# Patient Record
Sex: FEMALE | Race: WHITE | NOT HISPANIC OR LATINO | Employment: OTHER | URBAN - NONMETROPOLITAN AREA
[De-identification: names, ages, dates, MRNs, and addresses within clinical notes are randomized per-mention and may not be internally consistent; named-entity substitution may affect disease eponyms.]

---

## 2018-06-20 ENCOUNTER — HOSPITAL ENCOUNTER (EMERGENCY)
Facility: HOSPITAL | Age: 48
Discharge: HOME | End: 2018-06-20
Attending: EMERGENCY MEDICINE | Admitting: EMERGENCY MEDICINE

## 2018-06-20 VITALS
HEIGHT: 64 IN | BODY MASS INDEX: 26.84 KG/M2 | WEIGHT: 157.2 LBS | OXYGEN SATURATION: 97 % | DIASTOLIC BLOOD PRESSURE: 93 MMHG | TEMPERATURE: 97.9 F | HEART RATE: 103 BPM | SYSTOLIC BLOOD PRESSURE: 156 MMHG | RESPIRATION RATE: 18 BRPM

## 2018-06-20 DIAGNOSIS — S33.5XXA LUMBAR SPRAIN, INITIAL ENCOUNTER: Primary | ICD-10-CM

## 2018-06-20 PROCEDURE — 25010000002 ORPHENADRINE CITRATE PER 60 MG: Performed by: NURSE PRACTITIONER

## 2018-06-20 PROCEDURE — 99283 EMERGENCY DEPT VISIT LOW MDM: CPT

## 2018-06-20 PROCEDURE — 96372 THER/PROPH/DIAG INJ SC/IM: CPT

## 2018-06-20 PROCEDURE — 25010000002 KETOROLAC TROMETHAMINE PER 15 MG: Performed by: NURSE PRACTITIONER

## 2018-06-20 RX ORDER — CYCLOBENZAPRINE HCL 10 MG
10 TABLET ORAL 2 TIMES DAILY PRN
Qty: 14 TABLET | Refills: 0 | Status: SHIPPED | OUTPATIENT
Start: 2018-06-20 | End: 2020-02-07 | Stop reason: HOSPADM

## 2018-06-20 RX ORDER — NAPROXEN 500 MG/1
500 TABLET ORAL 2 TIMES DAILY WITH MEALS
Qty: 14 TABLET | Refills: 0 | Status: SHIPPED | OUTPATIENT
Start: 2018-06-20 | End: 2018-11-29

## 2018-06-20 RX ORDER — LIDOCAINE 50 MG/G
1 PATCH TOPICAL EVERY 24 HOURS
Qty: 15 PATCH | Refills: 0 | Status: SHIPPED | OUTPATIENT
Start: 2018-06-20 | End: 2020-02-07 | Stop reason: HOSPADM

## 2018-06-20 RX ORDER — ORPHENADRINE CITRATE 30 MG/ML
60 INJECTION INTRAMUSCULAR; INTRAVENOUS ONCE
Status: COMPLETED | OUTPATIENT
Start: 2018-06-20 | End: 2018-06-20

## 2018-06-20 RX ORDER — KETOROLAC TROMETHAMINE 30 MG/ML
15 INJECTION, SOLUTION INTRAMUSCULAR; INTRAVENOUS ONCE
Status: COMPLETED | OUTPATIENT
Start: 2018-06-20 | End: 2018-06-20

## 2018-06-20 RX ORDER — OMEPRAZOLE 20 MG/1
20 CAPSULE, DELAYED RELEASE ORAL DAILY
COMMUNITY
End: 2018-12-03 | Stop reason: SDUPTHER

## 2018-06-20 RX ADMIN — KETOROLAC TROMETHAMINE 15 MG: 30 INJECTION, SOLUTION INTRAMUSCULAR at 14:24

## 2018-06-20 RX ADMIN — ORPHENADRINE CITRATE 60 MG: 30 INJECTION INTRAMUSCULAR; INTRAVENOUS at 14:24

## 2018-06-22 NOTE — ED PROVIDER NOTES
Subjective   History of Present Illness  48-year-old female presents with complaints of left lower back pain.  She states it's been bothering her since Monday.  She does not remember any specific injury.  She does not remember lifting anything that caused her pain.  She states she does have some chronic low back pain and it may be related to that.  She has tried ibuprofen 600 mg once over-the-counter and some Tylenol.  She has used ice which does seem to help.  She denies any change in bowel or bladder habits.  She does have a little pain in her left buttock that radiates somewhat down the thigh.  Review of Systems   All other systems reviewed and are negative.      Past Medical History:   Diagnosis Date   • GERD (gastroesophageal reflux disease)        No Known Allergies    Past Surgical History:   Procedure Laterality Date   • CHOLECYSTECTOMY     • HIP SURGERY     • JOINT REPLACEMENT     • TUBAL ABDOMINAL LIGATION         History reviewed. No pertinent family history.    Social History     Social History   • Marital status:      Social History Main Topics   • Smoking status: Current Every Day Smoker     Packs/day: 0.50     Years: 25.00     Types: Cigarettes   • Smokeless tobacco: Never Used   • Alcohol use Yes      Comment: weekly- couple drinks    • Drug use: No     Other Topics Concern   • Not on file           Objective   Physical Exam   Constitutional: She is oriented to person, place, and time. She appears well-developed and well-nourished.   HENT:   Head: Normocephalic and atraumatic.   Mouth/Throat: Oropharynx is clear and moist.   Eyes: Conjunctivae are normal. Pupils are equal, round, and reactive to light.   Neck: Normal range of motion.   Cardiovascular: Normal rate, regular rhythm, normal heart sounds and intact distal pulses.    No murmur heard.  Pulmonary/Chest: Effort normal and breath sounds normal.   Abdominal: Soft. Bowel sounds are normal.   Musculoskeletal: Normal range of motion.    Decreased range of motion at the lumbar spine due to pain.  No weakness in the lower extremity's bilaterally.   Neurological: She is alert and oriented to person, place, and time.   Negative straight leg raise bilaterally.   Skin: Skin is warm and dry. Capillary refill takes less than 2 seconds.   Psychiatric: She has a normal mood and affect. Her behavior is normal. Judgment and thought content normal.   Nursing note and vitals reviewed.      Procedures           ED Course        Patient was given Toradol 15 mg and Norflex 60 mg.  She states she does have relief and the pain.  I will discharge her with anti-inflammatories and muscle relaxants.  I gave her some stretching exercises as well as recommended ice and alternate with heat.  She should follow-up with her primary care provider if her symptoms do not improve.          MDM      Final diagnoses:   Lumbar sprain, initial encounter            Lisa Chávez, APRN  06/21/18 8095

## 2018-11-29 ENCOUNTER — OFFICE VISIT (OUTPATIENT)
Dept: INTERNAL MEDICINE | Facility: CLINIC | Age: 48
End: 2018-11-29

## 2018-11-29 VITALS
SYSTOLIC BLOOD PRESSURE: 144 MMHG | OXYGEN SATURATION: 97 % | BODY MASS INDEX: 25.68 KG/M2 | WEIGHT: 154.12 LBS | TEMPERATURE: 98 F | RESPIRATION RATE: 16 BRPM | HEIGHT: 65 IN | HEART RATE: 106 BPM | DIASTOLIC BLOOD PRESSURE: 86 MMHG

## 2018-11-29 DIAGNOSIS — K29.50 OTHER CHRONIC GASTRITIS WITHOUT HEMORRHAGE: ICD-10-CM

## 2018-11-29 DIAGNOSIS — R19.7 DIARRHEA, UNSPECIFIED TYPE: ICD-10-CM

## 2018-11-29 DIAGNOSIS — G47.33 OSA (OBSTRUCTIVE SLEEP APNEA): ICD-10-CM

## 2018-11-29 DIAGNOSIS — R10.13 EPIGASTRIC PAIN: ICD-10-CM

## 2018-11-29 DIAGNOSIS — Z00.00 ROUTINE GENERAL MEDICAL EXAMINATION AT A HEALTH CARE FACILITY: ICD-10-CM

## 2018-11-29 DIAGNOSIS — I10 ESSENTIAL HYPERTENSION: Primary | ICD-10-CM

## 2018-11-29 DIAGNOSIS — Z72.0 TOBACCO ABUSE: ICD-10-CM

## 2018-11-29 PROBLEM — K29.70 GASTRITIS: Status: ACTIVE | Noted: 2018-11-29

## 2018-11-29 PROBLEM — Z90.49 S/P CHOLE: Status: ACTIVE | Noted: 2018-11-29

## 2018-11-29 PROBLEM — K44.9 HIATAL HERNIA: Status: ACTIVE | Noted: 2018-11-29

## 2018-11-29 PROBLEM — K57.90 DIVERTICULOSIS: Status: ACTIVE | Noted: 2018-11-29

## 2018-11-29 PROBLEM — K27.9 PUD (PEPTIC ULCER DISEASE): Status: ACTIVE | Noted: 2018-11-29

## 2018-11-29 PROBLEM — Z96.641 HISTORY OF RIGHT HIP REPLACEMENT: Status: ACTIVE | Noted: 2018-11-29

## 2018-11-29 PROCEDURE — 90632 HEPA VACCINE ADULT IM: CPT | Performed by: INTERNAL MEDICINE

## 2018-11-29 PROCEDURE — 99204 OFFICE O/P NEW MOD 45 MIN: CPT | Performed by: INTERNAL MEDICINE

## 2018-11-29 PROCEDURE — 90471 IMMUNIZATION ADMIN: CPT | Performed by: INTERNAL MEDICINE

## 2018-11-29 RX ORDER — HYDROCHLOROTHIAZIDE 12.5 MG/1
12.5 CAPSULE, GELATIN COATED ORAL DAILY
Qty: 30 CAPSULE | Refills: 5 | Status: SHIPPED | OUTPATIENT
Start: 2018-11-29 | End: 2020-02-07 | Stop reason: HOSPADM

## 2018-11-29 RX ORDER — VARENICLINE TARTRATE 1 MG/1
1 TABLET, FILM COATED ORAL
Qty: 60 TABLET | Refills: 2 | Status: SHIPPED | OUTPATIENT
Start: 2018-11-29 | End: 2020-02-07 | Stop reason: HOSPADM

## 2018-11-29 NOTE — PROGRESS NOTES
Subjective     Patient ID: Yee Goodwin is a 48 y.o. female. Patient is here for management of multiple medical problems.     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   • Medication refills     patient needs refills on Omeprazole     History of Present Illness       Moved here from [place].    Seen in Clinic but no established MD.       Works at [employer].  .    Gerd on ppi. Ok control.    Pain in right hip and revision x 2.     BP elevated. Just started in the past year.  No meds.     Tired all the time.  Wakes tired.   Wakes tired.    + snoring.  Possible apnea.       Diarrhea x 7 years.  Coloscopy 3 years ago. Polyps  Still with gastritis.            The following portions of the patient's history were reviewed and updated as appropriate: allergies, current medications, past family history, past medical history, past social history, past surgical history and problem list.    Review of Systems   Constitutional: Positive for fatigue.   HENT: Negative for congestion, dental problem, drooling and ear discharge.    Psychiatric/Behavioral: Positive for sleep disturbance.   All other systems reviewed and are negative.      Current Outpatient Medications:   •  cyclobenzaprine (FLEXERIL) 10 MG tablet, Take 1 tablet by mouth 2 (Two) Times a Day As Needed for Muscle Spasms., Disp: 14 tablet, Rfl: 0  •  hydrochlorothiazide (MICROZIDE) 12.5 MG capsule, Take 1 capsule by mouth Daily., Disp: 30 capsule, Rfl: 5  •  lidocaine (LIDODERM) 5 %, Place 1 patch on the skin Daily. Remove & Discard patch within 12 hours or as directed by MD, Disp: 15 patch, Rfl: 0  •  omeprazole (priLOSEC) 20 MG capsule, Take 20 mg by mouth Daily., Disp: , Rfl:   •  varenicline (CHANTIX CONTINUING MONTH PAWAN) 1 MG tablet, Take 1 tablet  "by mouth 2 (Two) Times a Day., Disp: 60 tablet, Rfl: 2  •  varenicline (CHANTIX STARTING MONTH PAK) 0.5 MG X 11 & 1 MG X 42 tablet, Take 0.5 mg one daily on days 1-3 and and 0.5 mg twice daily on days 4-7.Then 1 mg twice daily for a total of 12 weeks., Disp: 53 tablet, Rfl: 0    Objective      Blood pressure 144/86, pulse 106, temperature 98 °F (36.7 °C), resp. rate 16, height 165.1 cm (65\"), weight 69.9 kg (154 lb 1.9 oz), SpO2 97 %.    Physical Exam     General Appearance:    Alert, cooperative, no distress, appears stated age   Head:    Normocephalic, without obvious abnormality, atraumatic   Eyes:    PERRL, conjunctiva/corneas clear, EOM's intact   Ears:    Normal TM's and external ear canals, both ears   Nose:   Nares normal, septum midline, mucosa normal, no drainage   or sinus tenderness   Throat:   Lips, mucosa, and tongue normal; teeth and gums normal   Neck:   Supple, symmetrical, trachea midline, no adenopathy;        thyroid:  No enlargement/tenderness/nodules; no carotid    bruit or JVD   Back:     Symmetric, no curvature, ROM normal, no CVA tenderness   Lungs:     Clear to auscultation bilaterally, respirations unlabored   Chest wall:    No tenderness or deformity   Heart:    Regular rate and rhythm, S1 and S2 normal, no murmur,        rub or gallop   Abdomen:     Soft, non-tender, bowel sounds active all four quadrants,     no masses, no organomegaly   Extremities:   ttp right hip.  Extremities normal, atraumatic, no cyanosis or edema   Pulses:   2+ and symmetric all extremities   Skin:   Skin color, texture, turgor normal, no rashes or lesions   Lymph nodes:   Cervical, supraclavicular, and axillary nodes normal   Neurologic:   CNII-XII intact. Normal strength, sensation and reflexes       throughout      Results for orders placed or performed during the hospital encounter of 11/23/15   Basic metabolic panel   Result Value Ref Range    Sodium 141 137 - 145 mmol/L    Potassium 4.3 3.5 - 5.1 mmol/L    " Chloride 105 98 - 107 mmol/L    CO2 24 (L) 26 - 30 mmol/L    Anion Gap 16 10 - 20 mmol/L    BUN 9 7 - 20 mg/dL    Creatinine 0.7 0.6 - 1.3 mg/dL    eGFR 90 mL/min    Glucose 86 74 - 98 mg/dL    Calcium 10.4 (H) 8.4 - 10.2 mg/dL   Pregnancy, urine   Result Value Ref Range    HCG Urine, QL NEGATIVE    CBC and Differential   Result Value Ref Range    WBC 7.4 4.8 - 10.8 THOUS    RBC 4.43 4.20 - 5.40 m/uL    Hemoglobin 13.5 12.0 - 16.0 g/dL    Hematocrit 40 37 - 47 %    MCV 90.1 81.0 - 99.0 fL    MCH 30.5 27.0 - 31.0 uug    MCHC 33.8 30.0 - 37.0 g/dL    RDW 13.1 11.5 - 14.5 %    Platelets 356 130 - 400 THOUS    Neutrophil Rel % 66.1 37.0 - 80.0 %    Lymphocyte Rel % 22.6 10.0 - 50.0 %    Monocyte Rel % 8.2 0.0 - 12.0 %    Eosinophil Rel % 1.9 0.0 - 7.0 %    Basophil Rel % 0.90 0.00 - 2.50 %    Immature Granulocyte Rel % 0.30 0.00 - 2.50 %    Neutrophils Absolute 4.92 2.00 - 6.90 THOUS    Lymphocytes Absolute 1.68 0.60 - 3.40 THOUS    Monocytes Absolute 0.61 0.00 - 0.90 THOUS    Eosinophils Absolute 0.14 0.00 - 0.70 THOUS    Basophils Absolute 0.07 0.00 - 0.20 THOUS    Abs Imm Gran 0.02 0.00 - 0.60 THOUS         Assessment/Plan   Pain gel.     Pud and bleeding. Needs to stop nsaids.    Take ppi correctly.  Hx of Ulcer in DD.      Yee was seen today for annual exam and medication refills.    Diagnoses and all orders for this visit:    Essential hypertension  -     Lipid Panel  -     hydrochlorothiazide (MICROZIDE) 12.5 MG capsule; Take 1 capsule by mouth Daily.    Other chronic gastritis without hemorrhage  -     Hepatitis Panel, Acute    JAYESH (obstructive sleep apnea)  -     Hepatitis Panel, Acute  -     Ambulatory Referral to Neurology    Epigastric pain  -     Comprehensive Metabolic Panel  -     CBC & Differential  -     Lipid Panel  -     H. Pylori Antibody, IgG  -     H. Pylori Antibody, IgA  -     Glia(IgA / G) & TTG(IgA / G)  -     Endomysial Antibody, IgA / IGG Titer  -     Hepatitis Panel, Acute    Diarrhea,  unspecified type  -     TSH  -     T4, Free  -     Comprehensive Metabolic Panel  -     Vitamin B12  -     CBC & Differential  -     Lipid Panel  -     H. Pylori Antibody, IgG  -     H. Pylori Antibody, IgA  -     Glia(IgA / G) & TTG(IgA / G)  -     Endomysial Antibody, IgA / IGG Titer  -     Hepatitis Panel, Acute    Tobacco abuse  -     varenicline (CHANTIX STARTING MONTH PAK) 0.5 MG X 11 & 1 MG X 42 tablet; Take 0.5 mg one daily on days 1-3 and and 0.5 mg twice daily on days 4-7.Then 1 mg twice daily for a total of 12 weeks.  -     varenicline (CHANTIX CONTINUING MONTH PAK) 1 MG tablet; Take 1 tablet by mouth 2 (Two) Times a Day.    Routine general medical examination at a health care facility  -     Ambulatory Referral to Gynecology  -     Mammo Screening Digital Tomosynthesis Bilateral With CAD    Other orders  -     Hepatitis A Vaccine Adult IM    pain gel of rhip pain'  rx sent    Return in about 6 weeks (around 1/10/2019).          There are no Patient Instructions on file for this visit.     Ozzie Lucero MD    Assessment/Plan

## 2018-12-03 ENCOUNTER — TELEPHONE (OUTPATIENT)
Dept: INTERNAL MEDICINE | Facility: CLINIC | Age: 48
End: 2018-12-03

## 2018-12-03 RX ORDER — OMEPRAZOLE 20 MG/1
20 CAPSULE, DELAYED RELEASE ORAL DAILY
Qty: 30 CAPSULE | Refills: 5 | Status: SHIPPED | OUTPATIENT
Start: 2018-12-03 | End: 2019-06-05 | Stop reason: SDUPTHER

## 2018-12-03 NOTE — TELEPHONE ENCOUNTER
PATIENT WAS IN THE OFFICE 11/29/2018 TO SEE DR. LLANOS. SHE HAD A FEW PRESCRIPTIONS CALLED INTO HER PHARMACY THAT DAY BUT IS MISSING HER OMEPRAZOLE. CAN THIS BE CALLED INTO [facility] PLEASE?

## 2018-12-10 LAB
ALBUMIN SERPL-MCNC: 4.6 G/DL (ref 3.5–5)
ALBUMIN/GLOB SERPL: 1.5 G/DL (ref 1–2)
ALP SERPL-CCNC: 111 U/L (ref 38–126)
ALT SERPL-CCNC: 67 U/L (ref 13–69)
AST SERPL-CCNC: 40 U/L (ref 15–46)
BASOPHILS # BLD AUTO: 0.11 10*3/MM3 (ref 0–0.2)
BASOPHILS NFR BLD AUTO: 1.4 % (ref 0–2.5)
BILIRUB SERPL-MCNC: 0.7 MG/DL (ref 0.2–1.3)
BUN SERPL-MCNC: 12 MG/DL (ref 7–20)
BUN/CREAT SERPL: 20 (ref 7.1–23.5)
CALCIUM SERPL-MCNC: 10.3 MG/DL (ref 8.4–10.2)
CHLORIDE SERPL-SCNC: 104 MMOL/L (ref 98–107)
CHOLEST SERPL-MCNC: 210 MG/DL (ref 0–199)
CO2 SERPL-SCNC: 26 MMOL/L (ref 26–30)
CREAT SERPL-MCNC: 0.6 MG/DL (ref 0.6–1.3)
ENDOMYSIAL ANTIBODY TITER IGA: NORMAL TITER
ENDOMYSIAL ANTIBODY TITER IGG: NORMAL TITER
EOSINOPHIL # BLD AUTO: 0.27 10*3/MM3 (ref 0–0.7)
EOSINOPHIL NFR BLD AUTO: 3.5 % (ref 0–7)
ERYTHROCYTE [DISTWIDTH] IN BLOOD BY AUTOMATED COUNT: 12.5 % (ref 11.5–14.5)
GLIADIN PEPTIDE IGA SER-ACNC: 4 UNITS (ref 0–19)
GLIADIN PEPTIDE IGG SER-ACNC: 5 UNITS (ref 0–19)
GLOBULIN SER CALC-MCNC: 3.1 GM/DL
GLUCOSE SERPL-MCNC: 106 MG/DL (ref 74–98)
H PYLORI IGA SER-ACNC: <9 UNITS (ref 0–8.9)
H PYLORI IGG SER IA-ACNC: 0.21 INDEX VALUE (ref 0–0.79)
HAV IGM SERPL QL IA: NEGATIVE
HBV CORE IGM SERPL QL IA: NEGATIVE
HBV SURFACE AG SERPL QL IA: NEGATIVE
HCT VFR BLD AUTO: 45.2 % (ref 37–47)
HCV AB S/CO SERPL IA: <0.1 S/CO RATIO (ref 0–0.9)
HDLC SERPL-MCNC: 57 MG/DL (ref 40–60)
HGB BLD-MCNC: 14.9 G/DL (ref 12–16)
IMM GRANULOCYTES # BLD: 0.01 10*3/MM3 (ref 0–0.06)
IMM GRANULOCYTES NFR BLD: 0.1 % (ref 0–0.6)
LDLC SERPL CALC-MCNC: 126 MG/DL (ref 0–99)
LYMPHOCYTES # BLD AUTO: 1.91 10*3/MM3 (ref 0.6–3.4)
LYMPHOCYTES NFR BLD AUTO: 24.6 % (ref 10–50)
MCH RBC QN AUTO: 31.1 PG (ref 27–31)
MCHC RBC AUTO-ENTMCNC: 33 G/DL (ref 30–37)
MCV RBC AUTO: 94.4 FL (ref 81–99)
MONOCYTES # BLD AUTO: 0.53 10*3/MM3 (ref 0–0.9)
MONOCYTES NFR BLD AUTO: 6.8 % (ref 0–12)
NEUTROPHILS # BLD AUTO: 4.94 10*3/MM3 (ref 2–6.9)
NEUTROPHILS NFR BLD AUTO: 63.6 % (ref 37–80)
NRBC BLD AUTO-RTO: 0 /100 WBC (ref 0–0)
PLATELET # BLD AUTO: 358 10*3/MM3 (ref 130–400)
POTASSIUM SERPL-SCNC: 4.1 MMOL/L (ref 3.5–5.1)
PROT SERPL-MCNC: 7.7 G/DL (ref 6.3–8.2)
RBC # BLD AUTO: 4.79 10*6/MM3 (ref 4.2–5.4)
SODIUM SERPL-SCNC: 138 MMOL/L (ref 137–145)
T4 FREE SERPL-MCNC: 1.09 NG/DL (ref 0.78–2.19)
TRIGL SERPL-MCNC: 137 MG/DL
TSH SERPL DL<=0.005 MIU/L-ACNC: 2.7 MIU/ML (ref 0.47–4.68)
TTG IGA SER-ACNC: <2 U/ML (ref 0–3)
TTG IGG SER-ACNC: 5 U/ML (ref 0–5)
VIT B12 SERPL-MCNC: 673 PG/ML (ref 239–931)
VLDLC SERPL CALC-MCNC: 27.4 MG/DL
WBC # BLD AUTO: 7.77 10*3/MM3 (ref 4.8–10.8)

## 2019-04-01 ENCOUNTER — OFFICE VISIT (OUTPATIENT)
Dept: ORTHOPEDIC SURGERY | Facility: CLINIC | Age: 49
End: 2019-04-01

## 2019-04-01 VITALS — WEIGHT: 162 LBS | BODY MASS INDEX: 27.66 KG/M2 | HEIGHT: 64 IN | RESPIRATION RATE: 18 BRPM

## 2019-04-01 DIAGNOSIS — S46.912A SHOULDER STRAIN, LEFT, INITIAL ENCOUNTER: ICD-10-CM

## 2019-04-01 DIAGNOSIS — M25.512 PAIN IN JOINT OF LEFT SHOULDER: Primary | ICD-10-CM

## 2019-04-01 PROCEDURE — 99203 OFFICE O/P NEW LOW 30 MIN: CPT | Performed by: ORTHOPAEDIC SURGERY

## 2019-04-01 NOTE — PROGRESS NOTES
Subjective   Patient ID: Yee Goodwin is a 48 y.o. female  Pain of the Left Shoulder (Patient states she was fixing to sit in chair when her shoulder popped, she states she had her hands on the arm rest as she plopped down on 03/27/19. 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  Since this occurred she has been able to move her arm but some pain mostly at the AC joint and medial clavicular area no bruising no history of prior injury to the shoulder that she can recall no numbness tingling does have chronic stiffness in the neck but pain in the neck does not radiate to the shoulder or arm.  Unable to take anti-inflammatories due to severe peptic ulcer disease      Review of Systems   Constitutional: Negative for fever.   HENT: Negative for voice change.    Eyes: Negative for visual disturbance.   Respiratory: Negative for shortness of breath.    Cardiovascular: Negative for chest pain.   Gastrointestinal: Negative for abdominal distention and abdominal pain.   Genitourinary: Negative for dysuria.   Musculoskeletal: Positive for arthralgias. Negative for gait problem and joint swelling.   Skin: Negative for rash.   Neurological: Negative for speech difficulty.   Hematological: Does not bruise/bleed easily.   Psychiatric/Behavioral: Negative for confusion.       Past Medical History:   Diagnosis Date   • Arthritis    • Diverticulitis    • GERD (gastroesophageal reflux disease)         Past Surgical History:   Procedure Laterality Date   • CHOLECYSTECTOMY     • COLONOSCOPY  2015   • HAND TENDON SURGERY Right     patient hand right thumb tendon surgery   • HIP SURGERY     • JOINT REPLACEMENT     • TUBAL ABDOMINAL LIGATION         Family History   Problem Relation Age  "of Onset   • Arthritis Mother    • Hypertension Mother    • Stroke Mother    • Arthritis Father    • Cancer Father    • Lung cancer Father    • Throat cancer Father    • Heart disease Sister    • Pulmonary embolism Brother        Social History     Socioeconomic History   • Marital status:      Spouse name: Not on file   • Number of children: Not on file   • Years of education: Not on file   • Highest education level: Not on file   Tobacco Use   • Smoking status: Current Every Day Smoker     Packs/day: 0.50     Years: 25.00     Pack years: 12.50     Types: Cigarettes   • Smokeless tobacco: Never Used   Substance and Sexual Activity   • Alcohol use: Yes     Comment: weekly- couple drinks    • Drug use: No   • Sexual activity: Defer       I have reviewed all of the above social hx, family hx, surgical hx, medications, allergies & ROS and confirm that it is accurate.    Allergies   Allergen Reactions   • Venlafaxine Rash         Current Outpatient Medications:   •  cyclobenzaprine (FLEXERIL) 10 MG tablet, Take 1 tablet by mouth 2 (Two) Times a Day As Needed for Muscle Spasms., Disp: 14 tablet, Rfl: 0  •  hydrochlorothiazide (MICROZIDE) 12.5 MG capsule, Take 1 capsule by mouth Daily., Disp: 30 capsule, Rfl: 5  •  lidocaine (LIDODERM) 5 %, Place 1 patch on the skin Daily. Remove & Discard patch within 12 hours or as directed by MD, Disp: 15 patch, Rfl: 0  •  omeprazole (priLOSEC) 20 MG capsule, Take 1 capsule by mouth Daily., Disp: 30 capsule, Rfl: 5  •  varenicline (CHANTIX CONTINUING MONTH PAWAN) 1 MG tablet, Take 1 tablet by mouth 2 (Two) Times a Day., Disp: 60 tablet, Rfl: 2  •  varenicline (CHANTIX STARTING MONTH PAK) 0.5 MG X 11 & 1 MG X 42 tablet, Take 0.5 mg one daily on days 1-3 and and 0.5 mg twice daily on days 4-7.Then 1 mg twice daily for a total of 12 weeks., Disp: 53 tablet, Rfl: 0    Objective   Resp 18   Ht 162.6 cm (64\")   Wt 73.5 kg (162 lb)   BMI 27.81 kg/m²    Physical Exam  Constitutional: " Patient is oriented to person, place, and time. Patient appears well-developed and well-nourished.   HENT:Head: Normocephalic and atraumatic.   Eyes: EOM are normal. Pupils are equal, round, and reactive to light.   Neck: Normal range of motion. Neck supple.   Cardiovascular: Normal rate.    Pulmonary/Chest: Effort normal and breath sounds normal.   Abdominal: Soft.   Neurological: Patient is alert and oriented to person, place, and time.   Skin: Skin is warm and dry.   Psychiatric: Patient has a normal mood and affect.   Nursing note and vitals reviewed.       [unfilled]   Left shoulder: Point tenderness at the distal AC joint and medial sternoclavicular area slight prominence noted anteriorly of the medial clavicular region at the sternoclavicular joint with no ecchymosis.  Full active range of motion of the left shoulder minimal signs of impingement cross body abduction does reproduce pain at the medial sternoclavicular joint anteriorly.  Negative inferior sulcus sign no signs of anterior apprehension    Assessment/Plan   Review of Radiographic Studies:    Radiographic images today of affected area I personally viewed and showed no sign of acute fracture or dislocation.      Procedures     Yee was seen today for pain.    Diagnoses and all orders for this visit:    Pain in joint of left shoulder  -     XR Clavicle Left  -     XR Shoulder 2+ View Left    Shoulder strain, left, initial encounter       Physical therapy referral given      Recommendations/Plan:   Referral: PT/OT 2-3 x wk x 4-6 wks    Patient agreeable to call or return sooner for any concerns.             Impression:  Left nondominant shoulder sternoclavicular anterior subluxation with strain, left acromial clavicular joint strain  Plan:  Refer to therapy recheck 6 weeks if not improving order MR left shoulder at that time

## 2019-06-05 RX ORDER — OMEPRAZOLE 20 MG/1
CAPSULE, DELAYED RELEASE ORAL
Qty: 30 CAPSULE | Refills: 0 | Status: SHIPPED | OUTPATIENT
Start: 2019-06-05 | End: 2019-07-08 | Stop reason: SDUPTHER

## 2019-06-05 RX ORDER — OMEPRAZOLE 20 MG/1
CAPSULE, DELAYED RELEASE ORAL
Qty: 90 CAPSULE | Refills: 0 | OUTPATIENT
Start: 2019-06-05

## 2019-07-08 RX ORDER — OMEPRAZOLE 20 MG/1
CAPSULE, DELAYED RELEASE ORAL
Qty: 30 CAPSULE | Refills: 0 | Status: SHIPPED | OUTPATIENT
Start: 2019-07-08 | End: 2020-02-07 | Stop reason: HOSPADM

## 2019-08-12 ENCOUNTER — TRANSCRIBE ORDERS (OUTPATIENT)
Dept: ADMINISTRATIVE | Facility: HOSPITAL | Age: 49
End: 2019-08-12

## 2019-08-12 DIAGNOSIS — R74.8 ACID PHOSPHATASE ELEVATED: Primary | ICD-10-CM

## 2020-01-28 ENCOUNTER — APPOINTMENT (OUTPATIENT)
Dept: GENERAL RADIOLOGY | Facility: HOSPITAL | Age: 50
End: 2020-01-28

## 2020-01-28 ENCOUNTER — APPOINTMENT (OUTPATIENT)
Dept: CT IMAGING | Facility: HOSPITAL | Age: 50
End: 2020-01-28

## 2020-01-28 ENCOUNTER — HOSPITAL ENCOUNTER (INPATIENT)
Facility: HOSPITAL | Age: 50
LOS: 10 days | Discharge: NURSING, REHAB OR LONG-TERM CARE | End: 2020-02-07
Attending: EMERGENCY MEDICINE | Admitting: NEUROLOGICAL SURGERY

## 2020-01-28 DIAGNOSIS — R48.2 APRAXIA: ICD-10-CM

## 2020-01-28 DIAGNOSIS — R47.01 APHASIA: ICD-10-CM

## 2020-01-28 DIAGNOSIS — R13.10 DYSPHAGIA, UNSPECIFIED TYPE: ICD-10-CM

## 2020-01-28 DIAGNOSIS — I62.9 INTRACRANIAL HEMORRHAGE (HCC): ICD-10-CM

## 2020-01-28 DIAGNOSIS — Z78.9 IMPAIRED MOBILITY AND ADLS: ICD-10-CM

## 2020-01-28 DIAGNOSIS — I61.9 ACUTE INTRACEREBRAL HEMORRHAGE (HCC): Primary | ICD-10-CM

## 2020-01-28 DIAGNOSIS — Z74.09 IMPAIRED MOBILITY AND ADLS: ICD-10-CM

## 2020-01-28 PROBLEM — I16.1 HYPERTENSIVE EMERGENCY: Status: ACTIVE | Noted: 2020-01-28

## 2020-01-28 PROBLEM — E87.6 HYPOKALEMIA: Status: ACTIVE | Noted: 2020-01-28

## 2020-01-28 PROBLEM — J96.02 ACUTE RESPIRATORY FAILURE WITH HYPOXIA AND HYPERCAPNIA (HCC): Status: ACTIVE | Noted: 2020-01-28

## 2020-01-28 PROBLEM — J96.01 ACUTE RESPIRATORY FAILURE WITH HYPOXIA AND HYPERCAPNIA: Status: ACTIVE | Noted: 2020-01-28

## 2020-01-28 PROBLEM — R74.01 ELEVATED TRANSAMINASE LEVEL: Status: ACTIVE | Noted: 2020-01-28

## 2020-01-28 PROBLEM — E78.5 DYSLIPIDEMIA: Status: ACTIVE | Noted: 2020-01-28

## 2020-01-28 LAB
ALBUMIN SERPL-MCNC: 4.8 G/DL (ref 3.5–5.2)
ALBUMIN/GLOB SERPL: 1.5 G/DL
ALP SERPL-CCNC: 111 U/L (ref 39–117)
ALT SERPL W P-5'-P-CCNC: 139 U/L (ref 1–33)
AMPHET+METHAMPHET UR QL: NEGATIVE
AMPHETAMINES UR QL: NEGATIVE
ANION GAP SERPL CALCULATED.3IONS-SCNC: 16 MMOL/L (ref 5–15)
APTT PPP: 21.7 SECONDS (ref 24–37)
ARTERIAL PATENCY WRIST A: ABNORMAL
AST SERPL-CCNC: 120 U/L (ref 1–32)
ATMOSPHERIC PRESS: ABNORMAL MM[HG]
B-HCG UR QL: NEGATIVE
BACTERIA UR QL AUTO: NORMAL /HPF
BARBITURATES UR QL SCN: NEGATIVE
BASE EXCESS BLDA CALC-SCNC: 1 MMOL/L (ref -5–5)
BASE EXCESS BLDA CALC-SCNC: 2.6 MMOL/L (ref 0–2)
BASOPHILS # BLD AUTO: 0.11 10*3/MM3 (ref 0–0.2)
BASOPHILS NFR BLD AUTO: 1.2 % (ref 0–1.5)
BDY SITE: ABNORMAL
BENZODIAZ UR QL SCN: NEGATIVE
BILIRUB SERPL-MCNC: 0.2 MG/DL (ref 0.2–1.2)
BILIRUB UR QL STRIP: NEGATIVE
BODY TEMPERATURE: 37 C
BUN BLD-MCNC: 15 MG/DL (ref 6–20)
BUN/CREAT SERPL: 25 (ref 7–25)
BUPRENORPHINE SERPL-MCNC: NEGATIVE NG/ML
CA-I BLDA-SCNC: 1.27 MMOL/L (ref 1.2–1.32)
CALCIUM SPEC-SCNC: 9.3 MG/DL (ref 8.6–10.5)
CANNABINOIDS SERPL QL: NEGATIVE
CHLORIDE SERPL-SCNC: 100 MMOL/L (ref 98–107)
CLARITY UR: CLEAR
CO2 BLDA-SCNC: 28 MMOL/L (ref 24–29)
CO2 BLDA-SCNC: 30.2 MMOL/L (ref 22–33)
CO2 SERPL-SCNC: 25 MMOL/L (ref 22–29)
COCAINE UR QL: NEGATIVE
COHGB MFR BLD: 1.3 % (ref 0–2)
COLOR UR: YELLOW
CREAT BLD-MCNC: 0.6 MG/DL (ref 0.57–1)
DEPRECATED RDW RBC AUTO: 44.8 FL (ref 37–54)
EOSINOPHIL # BLD AUTO: 0.16 10*3/MM3 (ref 0–0.4)
EOSINOPHIL NFR BLD AUTO: 1.8 % (ref 0.3–6.2)
ERYTHROCYTE [DISTWIDTH] IN BLOOD BY AUTOMATED COUNT: 13 % (ref 12.3–15.4)
GFR SERPL CREATININE-BSD FRML MDRD: 105 ML/MIN/1.73
GLOBULIN UR ELPH-MCNC: 3.2 GM/DL
GLUCOSE BLD-MCNC: 173 MG/DL (ref 65–99)
GLUCOSE BLDC GLUCOMTR-MCNC: 116 MG/DL (ref 70–130)
GLUCOSE BLDC GLUCOMTR-MCNC: 122 MG/DL (ref 70–130)
GLUCOSE UR STRIP-MCNC: NEGATIVE MG/DL
HCO3 BLDA-SCNC: 26.9 MMOL/L (ref 22–26)
HCO3 BLDA-SCNC: 28.7 MMOL/L (ref 20–26)
HCT VFR BLD AUTO: 40.8 % (ref 34–46.6)
HCT VFR BLD CALC: 39.9 %
HCT VFR BLDA CALC: 38 % (ref 38–51)
HGB BLD-MCNC: 13.3 G/DL (ref 12–15.9)
HGB BLDA-MCNC: 12.9 G/DL (ref 12–17)
HGB BLDA-MCNC: 13 G/DL (ref 14–18)
HGB UR QL STRIP.AUTO: NEGATIVE
HOROWITZ INDEX BLD+IHG-RTO: 60 %
HYALINE CASTS UR QL AUTO: NORMAL /LPF
IMM GRANULOCYTES # BLD AUTO: 0.04 10*3/MM3 (ref 0–0.05)
IMM GRANULOCYTES NFR BLD AUTO: 0.4 % (ref 0–0.5)
INR PPP: 0.94 (ref 0.85–1.16)
KETONES UR QL STRIP: NEGATIVE
LEUKOCYTE ESTERASE UR QL STRIP.AUTO: NEGATIVE
LYMPHOCYTES # BLD AUTO: 1.57 10*3/MM3 (ref 0.7–3.1)
LYMPHOCYTES NFR BLD AUTO: 17.4 % (ref 19.6–45.3)
MAGNESIUM SERPL-MCNC: 1.7 MG/DL (ref 1.6–2.6)
MCH RBC QN AUTO: 30.8 PG (ref 26.6–33)
MCHC RBC AUTO-ENTMCNC: 32.6 G/DL (ref 31.5–35.7)
MCV RBC AUTO: 94.4 FL (ref 79–97)
METHADONE UR QL SCN: NEGATIVE
METHGB BLD QL: 1.2 % (ref 0–1.5)
MODALITY: ABNORMAL
MONOCYTES # BLD AUTO: 0.71 10*3/MM3 (ref 0.1–0.9)
MONOCYTES NFR BLD AUTO: 7.9 % (ref 5–12)
NEUTROPHILS # BLD AUTO: 6.45 10*3/MM3 (ref 1.7–7)
NEUTROPHILS NFR BLD AUTO: 71.3 % (ref 42.7–76)
NITRITE UR QL STRIP: NEGATIVE
NOTE: ABNORMAL
NRBC BLD AUTO-RTO: 0 /100 WBC (ref 0–0.2)
OPIATES UR QL: NEGATIVE
OXYCODONE UR QL SCN: NEGATIVE
OXYHGB MFR BLDV: 95.4 % (ref 94–99)
PCO2 BLDA: 49.6 MM HG (ref 35–45)
PCO2 BLDA: 50 MM HG (ref 35–45)
PCO2 TEMP ADJ BLD: 49.6 MM HG (ref 35–45)
PCP UR QL SCN: NEGATIVE
PH BLDA: 7.34 PH UNITS (ref 7.35–7.6)
PH BLDA: 7.37 PH UNITS (ref 7.35–7.45)
PH UR STRIP.AUTO: <=5 [PH] (ref 5–8)
PH, TEMP CORRECTED: 7.37 PH UNITS
PLATELET # BLD AUTO: 309 10*3/MM3 (ref 140–450)
PMV BLD AUTO: 9.3 FL (ref 6–12)
PO2 BLDA: 42 MMHG (ref 80–105)
PO2 BLDA: 96.6 MM HG (ref 83–108)
PO2 TEMP ADJ BLD: 96.6 MM HG (ref 83–108)
POTASSIUM BLD-SCNC: 3.3 MMOL/L (ref 3.5–5.2)
POTASSIUM BLDA-SCNC: 3.2 MMOL/L (ref 3.5–4.9)
PROPOXYPH UR QL: NEGATIVE
PROT SERPL-MCNC: 8 G/DL (ref 6–8.5)
PROT UR QL STRIP: ABNORMAL
PROTHROMBIN TIME: 12.1 SECONDS (ref 11.2–14.3)
RBC # BLD AUTO: 4.32 10*6/MM3 (ref 3.77–5.28)
RBC # UR: NORMAL /HPF
REF LAB TEST METHOD: NORMAL
SAO2 % BLDA: 73 % (ref 95–98)
SODIUM BLD-SCNC: 141 MMOL/L (ref 136–145)
SODIUM BLDA-SCNC: 140 MMOL/L (ref 138–146)
SP GR UR STRIP: 1.09 (ref 1–1.03)
SQUAMOUS #/AREA URNS HPF: NORMAL /HPF
TRICYCLICS UR QL SCN: NEGATIVE
UROBILINOGEN UR QL STRIP: ABNORMAL
VENTILATOR MODE: ABNORMAL
WBC NRBC COR # BLD: 9.04 10*3/MM3 (ref 3.4–10.8)
WBC UR QL AUTO: NORMAL /HPF

## 2020-01-28 PROCEDURE — 94640 AIRWAY INHALATION TREATMENT: CPT

## 2020-01-28 PROCEDURE — 94799 UNLISTED PULMONARY SVC/PX: CPT

## 2020-01-28 PROCEDURE — 0 IOPAMIDOL PER 1 ML: Performed by: EMERGENCY MEDICINE

## 2020-01-28 PROCEDURE — 71045 X-RAY EXAM CHEST 1 VIEW: CPT

## 2020-01-28 PROCEDURE — 25010000002 MIDAZOLAM PER 1 MG: Performed by: EMERGENCY MEDICINE

## 2020-01-28 PROCEDURE — 81001 URINALYSIS AUTO W/SCOPE: CPT | Performed by: INTERNAL MEDICINE

## 2020-01-28 PROCEDURE — 85025 COMPLETE CBC W/AUTO DIFF WBC: CPT | Performed by: EMERGENCY MEDICINE

## 2020-01-28 PROCEDURE — 99291 CRITICAL CARE FIRST HOUR: CPT | Performed by: INTERNAL MEDICINE

## 2020-01-28 PROCEDURE — 82330 ASSAY OF CALCIUM: CPT

## 2020-01-28 PROCEDURE — 85610 PROTHROMBIN TIME: CPT | Performed by: EMERGENCY MEDICINE

## 2020-01-28 PROCEDURE — 93005 ELECTROCARDIOGRAM TRACING: CPT | Performed by: EMERGENCY MEDICINE

## 2020-01-28 PROCEDURE — 82803 BLOOD GASES ANY COMBINATION: CPT

## 2020-01-28 PROCEDURE — 94002 VENT MGMT INPAT INIT DAY: CPT

## 2020-01-28 PROCEDURE — 31500 INSERT EMERGENCY AIRWAY: CPT

## 2020-01-28 PROCEDURE — 85730 THROMBOPLASTIN TIME PARTIAL: CPT | Performed by: EMERGENCY MEDICINE

## 2020-01-28 PROCEDURE — 82947 ASSAY GLUCOSE BLOOD QUANT: CPT

## 2020-01-28 PROCEDURE — 94770: CPT

## 2020-01-28 PROCEDURE — 80306 DRUG TEST PRSMV INSTRMNT: CPT | Performed by: NURSE PRACTITIONER

## 2020-01-28 PROCEDURE — 36600 WITHDRAWAL OF ARTERIAL BLOOD: CPT

## 2020-01-28 PROCEDURE — 83735 ASSAY OF MAGNESIUM: CPT | Performed by: NURSE PRACTITIONER

## 2020-01-28 PROCEDURE — 99285 EMERGENCY DEPT VISIT HI MDM: CPT

## 2020-01-28 PROCEDURE — 80053 COMPREHEN METABOLIC PANEL: CPT | Performed by: EMERGENCY MEDICINE

## 2020-01-28 PROCEDURE — 70496 CT ANGIOGRAPHY HEAD: CPT

## 2020-01-28 PROCEDURE — 70498 CT ANGIOGRAPHY NECK: CPT

## 2020-01-28 PROCEDURE — 84295 ASSAY OF SERUM SODIUM: CPT

## 2020-01-28 PROCEDURE — 84132 ASSAY OF SERUM POTASSIUM: CPT

## 2020-01-28 PROCEDURE — 25010000002 PROPOFOL 10 MG/ML EMULSION: Performed by: EMERGENCY MEDICINE

## 2020-01-28 PROCEDURE — 81025 URINE PREGNANCY TEST: CPT | Performed by: NURSE PRACTITIONER

## 2020-01-28 PROCEDURE — 99221 1ST HOSP IP/OBS SF/LOW 40: CPT | Performed by: NEUROLOGICAL SURGERY

## 2020-01-28 PROCEDURE — 85014 HEMATOCRIT: CPT

## 2020-01-28 PROCEDURE — 70450 CT HEAD/BRAIN W/O DYE: CPT

## 2020-01-28 PROCEDURE — 82805 BLOOD GASES W/O2 SATURATION: CPT

## 2020-01-28 RX ORDER — NICOTINE POLACRILEX 4 MG
15 LOZENGE BUCCAL
Status: DISCONTINUED | OUTPATIENT
Start: 2020-01-28 | End: 2020-02-01

## 2020-01-28 RX ORDER — MAGNESIUM SULFATE HEPTAHYDRATE 40 MG/ML
2 INJECTION, SOLUTION INTRAVENOUS AS NEEDED
Status: DISCONTINUED | OUTPATIENT
Start: 2020-01-28 | End: 2020-02-07 | Stop reason: HOSPADM

## 2020-01-28 RX ORDER — SODIUM CHLORIDE 0.9 % (FLUSH) 0.9 %
10 SYRINGE (ML) INJECTION EVERY 12 HOURS SCHEDULED
Status: DISCONTINUED | OUTPATIENT
Start: 2020-01-28 | End: 2020-02-07 | Stop reason: HOSPADM

## 2020-01-28 RX ORDER — POTASSIUM CHLORIDE 7.45 MG/ML
10 INJECTION INTRAVENOUS
Status: DISCONTINUED | OUTPATIENT
Start: 2020-01-28 | End: 2020-01-30

## 2020-01-28 RX ORDER — POTASSIUM CHLORIDE 750 MG/1
40 CAPSULE, EXTENDED RELEASE ORAL AS NEEDED
Status: DISCONTINUED | OUTPATIENT
Start: 2020-01-28 | End: 2020-02-05

## 2020-01-28 RX ORDER — IPRATROPIUM BROMIDE AND ALBUTEROL SULFATE 2.5; .5 MG/3ML; MG/3ML
3 SOLUTION RESPIRATORY (INHALATION)
Status: DISCONTINUED | OUTPATIENT
Start: 2020-01-28 | End: 2020-01-30

## 2020-01-28 RX ORDER — POTASSIUM CHLORIDE 7.45 MG/ML
10 INJECTION INTRAVENOUS
Status: DISCONTINUED | OUTPATIENT
Start: 2020-01-28 | End: 2020-02-07 | Stop reason: HOSPADM

## 2020-01-28 RX ORDER — MAGNESIUM SULFATE HEPTAHYDRATE 40 MG/ML
4 INJECTION, SOLUTION INTRAVENOUS AS NEEDED
Status: DISCONTINUED | OUTPATIENT
Start: 2020-01-28 | End: 2020-02-07 | Stop reason: HOSPADM

## 2020-01-28 RX ORDER — DEXTROSE MONOHYDRATE 25 G/50ML
25 INJECTION, SOLUTION INTRAVENOUS
Status: DISCONTINUED | OUTPATIENT
Start: 2020-01-28 | End: 2020-02-01

## 2020-01-28 RX ORDER — ETOMIDATE 2 MG/ML
INJECTION INTRAVENOUS
Status: COMPLETED | OUTPATIENT
Start: 2020-01-28 | End: 2020-01-28

## 2020-01-28 RX ORDER — SODIUM CHLORIDE 0.9 % (FLUSH) 0.9 %
10 SYRINGE (ML) INJECTION AS NEEDED
Status: DISCONTINUED | OUTPATIENT
Start: 2020-01-28 | End: 2020-01-30

## 2020-01-28 RX ORDER — MIDAZOLAM HYDROCHLORIDE 1 MG/ML
INJECTION INTRAMUSCULAR; INTRAVENOUS
Status: COMPLETED | OUTPATIENT
Start: 2020-01-28 | End: 2020-01-28

## 2020-01-28 RX ORDER — CHLORHEXIDINE GLUCONATE 0.12 MG/ML
15 RINSE ORAL EVERY 12 HOURS SCHEDULED
Status: DISCONTINUED | OUTPATIENT
Start: 2020-01-28 | End: 2020-02-01

## 2020-01-28 RX ORDER — POTASSIUM CHLORIDE 1.5 G/1.77G
40 POWDER, FOR SOLUTION ORAL AS NEEDED
Status: DISCONTINUED | OUTPATIENT
Start: 2020-01-28 | End: 2020-02-07 | Stop reason: HOSPADM

## 2020-01-28 RX ORDER — ACETAMINOPHEN 650 MG/1
650 SUPPOSITORY RECTAL EVERY 6 HOURS PRN
Status: DISCONTINUED | OUTPATIENT
Start: 2020-01-28 | End: 2020-02-01

## 2020-01-28 RX ORDER — LISINOPRIL 20 MG/1
20 TABLET ORAL DAILY
Status: ON HOLD | COMMUNITY
End: 2020-02-07 | Stop reason: SDUPTHER

## 2020-01-28 RX ORDER — LABETALOL HYDROCHLORIDE 5 MG/ML
20 INJECTION, SOLUTION INTRAVENOUS
Status: DISCONTINUED | OUTPATIENT
Start: 2020-01-28 | End: 2020-02-07 | Stop reason: HOSPADM

## 2020-01-28 RX ORDER — PANTOPRAZOLE SODIUM 40 MG/10ML
40 INJECTION, POWDER, LYOPHILIZED, FOR SOLUTION INTRAVENOUS
Status: DISCONTINUED | OUTPATIENT
Start: 2020-01-28 | End: 2020-02-07 | Stop reason: HOSPADM

## 2020-01-28 RX ORDER — BENZONATATE 100 MG/1
100 CAPSULE ORAL 3 TIMES DAILY PRN
COMMUNITY
End: 2020-02-07 | Stop reason: HOSPADM

## 2020-01-28 RX ADMIN — CHLORHEXIDINE GLUCONATE 0.12% ORAL RINSE 15 ML: 1.2 LIQUID ORAL at 21:38

## 2020-01-28 RX ADMIN — PANTOPRAZOLE SODIUM 40 MG: 40 INJECTION, POWDER, FOR SOLUTION INTRAVENOUS at 18:45

## 2020-01-28 RX ADMIN — IPRATROPIUM BROMIDE AND ALBUTEROL SULFATE 3 ML: 2.5; .5 SOLUTION RESPIRATORY (INHALATION) at 22:57

## 2020-01-28 RX ADMIN — SODIUM CHLORIDE, PRESERVATIVE FREE 10 ML: 5 INJECTION INTRAVENOUS at 21:39

## 2020-01-28 RX ADMIN — MAGNESIUM SULFATE 4 G: 4 INJECTION INTRAVENOUS at 21:39

## 2020-01-28 RX ADMIN — ETOMIDATE 20 MG: 2 INJECTION, SOLUTION INTRAVENOUS at 17:45

## 2020-01-28 RX ADMIN — PROPOFOL 5 MCG/KG/MIN: 10 INJECTION, EMULSION INTRAVENOUS at 18:02

## 2020-01-28 RX ADMIN — IPRATROPIUM BROMIDE AND ALBUTEROL SULFATE 3 ML: 2.5; .5 SOLUTION RESPIRATORY (INHALATION) at 20:03

## 2020-01-28 RX ADMIN — SODIUM CHLORIDE 12.5 MG/HR: 9 INJECTION, SOLUTION INTRAVENOUS at 22:23

## 2020-01-28 RX ADMIN — MIDAZOLAM 2 MG: 1 INJECTION INTRAMUSCULAR; INTRAVENOUS at 17:51

## 2020-01-28 RX ADMIN — IOPAMIDOL 75 ML: 755 INJECTION, SOLUTION INTRAVENOUS at 17:25

## 2020-01-28 RX ADMIN — SODIUM CHLORIDE 5 MG/HR: 9 INJECTION, SOLUTION INTRAVENOUS at 18:15

## 2020-01-29 ENCOUNTER — APPOINTMENT (OUTPATIENT)
Dept: CT IMAGING | Facility: HOSPITAL | Age: 50
End: 2020-01-29

## 2020-01-29 ENCOUNTER — APPOINTMENT (OUTPATIENT)
Dept: CARDIOLOGY | Facility: HOSPITAL | Age: 50
End: 2020-01-29

## 2020-01-29 ENCOUNTER — APPOINTMENT (OUTPATIENT)
Dept: GENERAL RADIOLOGY | Facility: HOSPITAL | Age: 50
End: 2020-01-29

## 2020-01-29 LAB
ALBUMIN SERPL-MCNC: 4.4 G/DL (ref 3.5–5.2)
ALBUMIN/GLOB SERPL: 1.3 G/DL
ALP SERPL-CCNC: 108 U/L (ref 39–117)
ALT SERPL W P-5'-P-CCNC: 152 U/L (ref 1–33)
ANION GAP SERPL CALCULATED.3IONS-SCNC: 14 MMOL/L (ref 5–15)
ARTERIAL PATENCY WRIST A: ABNORMAL
AST SERPL-CCNC: 126 U/L (ref 1–32)
ATMOSPHERIC PRESS: ABNORMAL MM[HG]
BASE EXCESS BLDA CALC-SCNC: 2.4 MMOL/L (ref 0–2)
BASOPHILS # BLD AUTO: 0.07 10*3/MM3 (ref 0–0.2)
BASOPHILS NFR BLD AUTO: 0.5 % (ref 0–1.5)
BDY SITE: ABNORMAL
BH CV ECHO MEAS - AO MAX PG (FULL): 6.2 MMHG
BH CV ECHO MEAS - AO MAX PG: 17.7 MMHG
BH CV ECHO MEAS - AO MEAN PG (FULL): 3.6 MMHG
BH CV ECHO MEAS - AO MEAN PG: 10.7 MMHG
BH CV ECHO MEAS - AO ROOT AREA (BSA CORRECTED): 1.4
BH CV ECHO MEAS - AO ROOT AREA: 5.2 CM^2
BH CV ECHO MEAS - AO ROOT DIAM: 2.6 CM
BH CV ECHO MEAS - AO V2 MAX: 210.2 CM/SEC
BH CV ECHO MEAS - AO V2 MEAN: 150.5 CM/SEC
BH CV ECHO MEAS - AO V2 VTI: 36 CM
BH CV ECHO MEAS - AVA(I,A): 2.6 CM^2
BH CV ECHO MEAS - AVA(I,D): 2.6 CM^2
BH CV ECHO MEAS - AVA(V,A): 2.5 CM^2
BH CV ECHO MEAS - AVA(V,D): 2.5 CM^2
BH CV ECHO MEAS - BSA(HAYCOCK): 1.9 M^2
BH CV ECHO MEAS - BSA: 1.8 M^2
BH CV ECHO MEAS - BZI_BMI: 26.8 KILOGRAMS/M^2
BH CV ECHO MEAS - BZI_METRIC_HEIGHT: 167.6 CM
BH CV ECHO MEAS - BZI_METRIC_WEIGHT: 75.3 KG
BH CV ECHO MEAS - EDV(CUBED): 80.5 ML
BH CV ECHO MEAS - EDV(MOD-SP2): 75 ML
BH CV ECHO MEAS - EDV(MOD-SP4): 89 ML
BH CV ECHO MEAS - EDV(TEICH): 83.9 ML
BH CV ECHO MEAS - EF(CUBED): 76.4 %
BH CV ECHO MEAS - EF(MOD-BP): 73 %
BH CV ECHO MEAS - EF(MOD-SP2): 78.7 %
BH CV ECHO MEAS - EF(MOD-SP4): 67.4 %
BH CV ECHO MEAS - EF(TEICH): 68.7 %
BH CV ECHO MEAS - ESV(CUBED): 19 ML
BH CV ECHO MEAS - ESV(MOD-SP2): 16 ML
BH CV ECHO MEAS - ESV(MOD-SP4): 29 ML
BH CV ECHO MEAS - ESV(TEICH): 26.3 ML
BH CV ECHO MEAS - FS: 38.2 %
BH CV ECHO MEAS - IVS/LVPW: 0.92
BH CV ECHO MEAS - IVSD: 0.69 CM
BH CV ECHO MEAS - LA DIMENSION: 3.2 CM
BH CV ECHO MEAS - LA/AO: 1.2
BH CV ECHO MEAS - LAD MAJOR: 5.1 CM
BH CV ECHO MEAS - LAT PEAK E' VEL: 9.9 CM/SEC
BH CV ECHO MEAS - LATERAL E/E' RATIO: 11.4
BH CV ECHO MEAS - LV DIASTOLIC VOL/BSA (35-75): 48.2 ML/M^2
BH CV ECHO MEAS - LV MASS(C)D: 91.4 GRAMS
BH CV ECHO MEAS - LV MASS(C)DI: 49.5 GRAMS/M^2
BH CV ECHO MEAS - LV MAX PG: 11.4 MMHG
BH CV ECHO MEAS - LV MEAN PG: 7.1 MMHG
BH CV ECHO MEAS - LV SYSTOLIC VOL/BSA (12-30): 15.7 ML/M^2
BH CV ECHO MEAS - LV V1 MAX: 169.2 CM/SEC
BH CV ECHO MEAS - LV V1 MEAN: 123.3 CM/SEC
BH CV ECHO MEAS - LV V1 VTI: 30.1 CM
BH CV ECHO MEAS - LVIDD: 4.3 CM
BH CV ECHO MEAS - LVIDS: 2.7 CM
BH CV ECHO MEAS - LVLD AP2: 6.2 CM
BH CV ECHO MEAS - LVLD AP4: 6.6 CM
BH CV ECHO MEAS - LVLS AP2: 4.1 CM
BH CV ECHO MEAS - LVLS AP4: 4.7 CM
BH CV ECHO MEAS - LVOT AREA (M): 3.1 CM^2
BH CV ECHO MEAS - LVOT AREA: 3.1 CM^2
BH CV ECHO MEAS - LVOT DIAM: 2 CM
BH CV ECHO MEAS - LVPWD: 0.74 CM
BH CV ECHO MEAS - MED PEAK E' VEL: 7.2 CM/SEC
BH CV ECHO MEAS - MEDIAL E/E' RATIO: 15.5
BH CV ECHO MEAS - MV A MAX VEL: 100 CM/SEC
BH CV ECHO MEAS - MV DEC SLOPE: 490.1 CM/SEC^2
BH CV ECHO MEAS - MV DEC TIME: 0.17 SEC
BH CV ECHO MEAS - MV E MAX VEL: 114.8 CM/SEC
BH CV ECHO MEAS - MV E/A: 1.1
BH CV ECHO MEAS - MV MAX PG: 6.3 MMHG
BH CV ECHO MEAS - MV MEAN PG: 2.5 MMHG
BH CV ECHO MEAS - MV P1/2T MAX VEL: 129.4 CM/SEC
BH CV ECHO MEAS - MV P1/2T: 77.4 MSEC
BH CV ECHO MEAS - MV V2 MAX: 125.1 CM/SEC
BH CV ECHO MEAS - MV V2 MEAN: 72 CM/SEC
BH CV ECHO MEAS - MV V2 VTI: 34.2 CM
BH CV ECHO MEAS - MVA P1/2T LCG: 1.7 CM^2
BH CV ECHO MEAS - MVA(P1/2T): 2.8 CM^2
BH CV ECHO MEAS - MVA(VTI): 2.7 CM^2
BH CV ECHO MEAS - PA ACC SLOPE: 1344 CM/SEC^2
BH CV ECHO MEAS - PA ACC TIME: 0.11 SEC
BH CV ECHO MEAS - PA MAX PG: 11.8 MMHG
BH CV ECHO MEAS - PA MEAN PG: 7.1 MMHG
BH CV ECHO MEAS - PA PR(ACCEL): 31.5 MMHG
BH CV ECHO MEAS - PA V2 MAX: 171.7 CM/SEC
BH CV ECHO MEAS - PA V2 MEAN: 126.9 CM/SEC
BH CV ECHO MEAS - PA V2 VTI: 34 CM
BH CV ECHO MEAS - SI(AO): 101.8 ML/M^2
BH CV ECHO MEAS - SI(CUBED): 33.3 ML/M^2
BH CV ECHO MEAS - SI(LVOT): 50.9 ML/M^2
BH CV ECHO MEAS - SI(MOD-SP2): 31.9 ML/M^2
BH CV ECHO MEAS - SI(MOD-SP4): 32.5 ML/M^2
BH CV ECHO MEAS - SI(TEICH): 31.2 ML/M^2
BH CV ECHO MEAS - SV(AO): 188.1 ML
BH CV ECHO MEAS - SV(CUBED): 61.5 ML
BH CV ECHO MEAS - SV(LVOT): 94 ML
BH CV ECHO MEAS - SV(MOD-SP2): 59 ML
BH CV ECHO MEAS - SV(MOD-SP4): 60 ML
BH CV ECHO MEAS - SV(TEICH): 57.6 ML
BH CV ECHO MEAS - TAPSE (>1.6): 1.9 CM2
BH CV ECHO MEASUREMENTS AVERAGE E/E' RATIO: 13.43
BH CV VAS BP LEFT ARM: NORMAL MMHG
BH CV XLRA - RV BASE: 3.2 CM
BH CV XLRA - RV LENGTH: 5.7 CM
BH CV XLRA - RV MID: 2 CM
BH CV XLRA - TDI S': 17 CM/SEC
BILIRUB SERPL-MCNC: 0.5 MG/DL (ref 0.2–1.2)
BODY TEMPERATURE: 37 C
BUN BLD-MCNC: 11 MG/DL (ref 6–20)
BUN/CREAT SERPL: 22.4 (ref 7–25)
CALCIUM SPEC-SCNC: 8.7 MG/DL (ref 8.6–10.5)
CHLORIDE SERPL-SCNC: 97 MMOL/L (ref 98–107)
CHOLEST SERPL-MCNC: 216 MG/DL (ref 0–200)
CO2 BLDA-SCNC: 28 MMOL/L (ref 22–33)
CO2 SERPL-SCNC: 22 MMOL/L (ref 22–29)
COHGB MFR BLD: 1.4 % (ref 0–2)
CREAT BLD-MCNC: 0.49 MG/DL (ref 0.57–1)
DEPRECATED RDW RBC AUTO: 45.7 FL (ref 37–54)
EOSINOPHIL # BLD AUTO: 0.02 10*3/MM3 (ref 0–0.4)
EOSINOPHIL NFR BLD AUTO: 0.1 % (ref 0.3–6.2)
EPAP: 0
ERYTHROCYTE [DISTWIDTH] IN BLOOD BY AUTOMATED COUNT: 13.1 % (ref 12.3–15.4)
GFR SERPL CREATININE-BSD FRML MDRD: 133 ML/MIN/1.73
GLOBULIN UR ELPH-MCNC: 3.3 GM/DL
GLUCOSE BLD-MCNC: 164 MG/DL (ref 65–99)
GLUCOSE BLDC GLUCOMTR-MCNC: 139 MG/DL (ref 70–130)
GLUCOSE BLDC GLUCOMTR-MCNC: 142 MG/DL (ref 70–130)
GLUCOSE BLDC GLUCOMTR-MCNC: 156 MG/DL (ref 70–130)
GLUCOSE BLDC GLUCOMTR-MCNC: 95 MG/DL (ref 70–130)
HBA1C MFR BLD: 5.9 % (ref 4.8–5.6)
HCO3 BLDA-SCNC: 26.7 MMOL/L (ref 20–26)
HCT VFR BLD AUTO: 39.3 % (ref 34–46.6)
HCT VFR BLD CALC: 38.4 %
HDLC SERPL-MCNC: 61 MG/DL (ref 40–60)
HGB BLD-MCNC: 12.7 G/DL (ref 12–15.9)
HGB BLDA-MCNC: 12.5 G/DL (ref 14–18)
HOROWITZ INDEX BLD+IHG-RTO: 40 %
IMM GRANULOCYTES # BLD AUTO: 0.07 10*3/MM3 (ref 0–0.05)
IMM GRANULOCYTES NFR BLD AUTO: 0.5 % (ref 0–0.5)
IPAP: 0
LDLC SERPL CALC-MCNC: 121 MG/DL (ref 0–100)
LDLC/HDLC SERPL: 1.98 {RATIO}
LEFT ATRIUM VOLUME INDEX: 23.3 ML/M^2
LEFT ATRIUM VOLUME: 43 ML
LYMPHOCYTES # BLD AUTO: 0.83 10*3/MM3 (ref 0.7–3.1)
LYMPHOCYTES NFR BLD AUTO: 5.8 % (ref 19.6–45.3)
MAGNESIUM SERPL-MCNC: 2.6 MG/DL (ref 1.6–2.6)
MAXIMAL PREDICTED HEART RATE: 169 BPM
MCH RBC QN AUTO: 31.1 PG (ref 26.6–33)
MCHC RBC AUTO-ENTMCNC: 32.3 G/DL (ref 31.5–35.7)
MCV RBC AUTO: 96.1 FL (ref 79–97)
METHGB BLD QL: 0.7 % (ref 0–1.5)
MODALITY: ABNORMAL
MONOCYTES # BLD AUTO: 1.15 10*3/MM3 (ref 0.1–0.9)
MONOCYTES NFR BLD AUTO: 8 % (ref 5–12)
NEUTROPHILS # BLD AUTO: 12.27 10*3/MM3 (ref 1.7–7)
NEUTROPHILS NFR BLD AUTO: 85.1 % (ref 42.7–76)
NOTE: ABNORMAL
NRBC BLD AUTO-RTO: 0 /100 WBC (ref 0–0.2)
OXYHGB MFR BLDV: 95.6 % (ref 94–99)
PAW @ PEAK INSP FLOW SETTING VENT: 0 CMH2O
PCO2 BLDA: 39.7 MM HG (ref 35–45)
PCO2 TEMP ADJ BLD: 39.7 MM HG (ref 35–45)
PEEP RESPIRATORY: 5 CM[H2O]
PH BLDA: 7.44 PH UNITS (ref 7.35–7.45)
PH, TEMP CORRECTED: 7.44 PH UNITS
PHOSPHATE SERPL-MCNC: 1.8 MG/DL (ref 2.5–4.5)
PHOSPHATE SERPL-MCNC: 2 MG/DL (ref 2.5–4.5)
PLATELET # BLD AUTO: 327 10*3/MM3 (ref 140–450)
PMV BLD AUTO: 9.9 FL (ref 6–12)
PO2 BLDA: 83.7 MM HG (ref 83–108)
PO2 TEMP ADJ BLD: 83.7 MM HG (ref 83–108)
POTASSIUM BLD-SCNC: 4.1 MMOL/L (ref 3.5–5.2)
POTASSIUM BLD-SCNC: 4.3 MMOL/L (ref 3.5–5.2)
PROT SERPL-MCNC: 7.7 G/DL (ref 6–8.5)
RBC # BLD AUTO: 4.09 10*6/MM3 (ref 3.77–5.28)
SODIUM BLD-SCNC: 133 MMOL/L (ref 136–145)
STRESS TARGET HR: 144 BPM
TOTAL RATE: 0 BREATHS/MINUTE
TRIGL SERPL-MCNC: 170 MG/DL (ref 0–150)
VENTILATOR MODE: ABNORMAL
VLDLC SERPL-MCNC: 34 MG/DL
WBC NRBC COR # BLD: 14.41 10*3/MM3 (ref 3.4–10.8)

## 2020-01-29 PROCEDURE — 94799 UNLISTED PULMONARY SVC/PX: CPT

## 2020-01-29 PROCEDURE — 83735 ASSAY OF MAGNESIUM: CPT | Performed by: INTERNAL MEDICINE

## 2020-01-29 PROCEDURE — 84132 ASSAY OF SERUM POTASSIUM: CPT | Performed by: PHYSICIAN ASSISTANT

## 2020-01-29 PROCEDURE — C1769 GUIDE WIRE: HCPCS | Performed by: RADIOLOGY

## 2020-01-29 PROCEDURE — C1894 INTRO/SHEATH, NON-LASER: HCPCS | Performed by: RADIOLOGY

## 2020-01-29 PROCEDURE — 36224 PLACE CATH CAROTD ART: CPT | Performed by: RADIOLOGY

## 2020-01-29 PROCEDURE — 36226 PLACE CATH VERTEBRAL ART: CPT | Performed by: RADIOLOGY

## 2020-01-29 PROCEDURE — 93306 TTE W/DOPPLER COMPLETE: CPT

## 2020-01-29 PROCEDURE — B31C1ZZ FLUOROSCOPY OF BILATERAL EXTERNAL CAROTID ARTERIES USING LOW OSMOLAR CONTRAST: ICD-10-PCS | Performed by: RADIOLOGY

## 2020-01-29 PROCEDURE — 94003 VENT MGMT INPAT SUBQ DAY: CPT

## 2020-01-29 PROCEDURE — 83036 HEMOGLOBIN GLYCOSYLATED A1C: CPT | Performed by: NURSE PRACTITIONER

## 2020-01-29 PROCEDURE — 25010000002 PROPOFOL 10 MG/ML EMULSION: Performed by: EMERGENCY MEDICINE

## 2020-01-29 PROCEDURE — 99291 CRITICAL CARE FIRST HOUR: CPT | Performed by: INTERNAL MEDICINE

## 2020-01-29 PROCEDURE — 25010000003 POTASSIUM CHLORIDE 10 MEQ/100ML SOLUTION: Performed by: NURSE PRACTITIONER

## 2020-01-29 PROCEDURE — 99232 SBSQ HOSP IP/OBS MODERATE 35: CPT | Performed by: NEUROLOGICAL SURGERY

## 2020-01-29 PROCEDURE — 87070 CULTURE OTHR SPECIMN AEROBIC: CPT | Performed by: INTERNAL MEDICINE

## 2020-01-29 PROCEDURE — 0 IOPAMIDOL PER 1 ML: Performed by: RADIOLOGY

## 2020-01-29 PROCEDURE — 82805 BLOOD GASES W/O2 SATURATION: CPT

## 2020-01-29 PROCEDURE — 94770: CPT

## 2020-01-29 PROCEDURE — 84100 ASSAY OF PHOSPHORUS: CPT | Performed by: INTERNAL MEDICINE

## 2020-01-29 PROCEDURE — 82962 GLUCOSE BLOOD TEST: CPT

## 2020-01-29 PROCEDURE — 85025 COMPLETE CBC W/AUTO DIFF WBC: CPT | Performed by: INTERNAL MEDICINE

## 2020-01-29 PROCEDURE — 80053 COMPREHEN METABOLIC PANEL: CPT | Performed by: NURSE PRACTITIONER

## 2020-01-29 PROCEDURE — 25010000002 PROPOFOL 10 MG/ML EMULSION: Performed by: PHYSICIAN ASSISTANT

## 2020-01-29 PROCEDURE — 71045 X-RAY EXAM CHEST 1 VIEW: CPT

## 2020-01-29 PROCEDURE — 87205 SMEAR GRAM STAIN: CPT | Performed by: INTERNAL MEDICINE

## 2020-01-29 PROCEDURE — C1760 CLOSURE DEV, VASC: HCPCS | Performed by: RADIOLOGY

## 2020-01-29 PROCEDURE — 70450 CT HEAD/BRAIN W/O DYE: CPT

## 2020-01-29 PROCEDURE — 80061 LIPID PANEL: CPT | Performed by: NURSE PRACTITIONER

## 2020-01-29 PROCEDURE — 36227 PLACE CATH XTRNL CAROTID: CPT | Performed by: RADIOLOGY

## 2020-01-29 PROCEDURE — 36600 WITHDRAWAL OF ARTERIAL BLOOD: CPT

## 2020-01-29 PROCEDURE — 93306 TTE W/DOPPLER COMPLETE: CPT | Performed by: INTERNAL MEDICINE

## 2020-01-29 RX ORDER — SODIUM CHLORIDE 0.9 % (FLUSH) 0.9 %
10 SYRINGE (ML) INJECTION AS NEEDED
Status: DISCONTINUED | OUTPATIENT
Start: 2020-01-29 | End: 2020-02-07 | Stop reason: HOSPADM

## 2020-01-29 RX ORDER — SODIUM CHLORIDE 0.9 % (FLUSH) 0.9 %
3 SYRINGE (ML) INJECTION EVERY 12 HOURS SCHEDULED
Status: DISCONTINUED | OUTPATIENT
Start: 2020-01-29 | End: 2020-01-30

## 2020-01-29 RX ORDER — LISINOPRIL 20 MG/1
20 TABLET ORAL DAILY
Status: DISCONTINUED | OUTPATIENT
Start: 2020-01-29 | End: 2020-02-05

## 2020-01-29 RX ORDER — SODIUM CHLORIDE 9 MG/ML
75 INJECTION, SOLUTION INTRAVENOUS CONTINUOUS
Status: ACTIVE | OUTPATIENT
Start: 2020-01-29 | End: 2020-01-29

## 2020-01-29 RX ORDER — PANTOPRAZOLE SODIUM 40 MG/1
40 TABLET, DELAYED RELEASE ORAL EVERY MORNING
Status: DISCONTINUED | OUTPATIENT
Start: 2020-01-29 | End: 2020-01-29 | Stop reason: SDUPTHER

## 2020-01-29 RX ADMIN — PROPOFOL 25 MCG/KG/MIN: 10 INJECTION, EMULSION INTRAVENOUS at 04:17

## 2020-01-29 RX ADMIN — PANTOPRAZOLE SODIUM 40 MG: 40 INJECTION, POWDER, FOR SOLUTION INTRAVENOUS at 11:45

## 2020-01-29 RX ADMIN — SODIUM PHOSPHATE, MONOBASIC, MONOHYDRATE 15 MMOL: 276; 142 INJECTION, SOLUTION INTRAVENOUS at 22:11

## 2020-01-29 RX ADMIN — PROPOFOL 25 MCG/KG/MIN: 10 INJECTION, EMULSION INTRAVENOUS at 00:19

## 2020-01-29 RX ADMIN — POTASSIUM CHLORIDE 10 MEQ: 7.46 INJECTION, SOLUTION INTRAVENOUS at 04:17

## 2020-01-29 RX ADMIN — PROPOFOL 50 MCG/KG/MIN: 10 INJECTION, EMULSION INTRAVENOUS at 09:19

## 2020-01-29 RX ADMIN — PROPOFOL 30 MCG/KG/MIN: 10 INJECTION, EMULSION INTRAVENOUS at 13:43

## 2020-01-29 RX ADMIN — IPRATROPIUM BROMIDE AND ALBUTEROL SULFATE 3 ML: 2.5; .5 SOLUTION RESPIRATORY (INHALATION) at 12:07

## 2020-01-29 RX ADMIN — SODIUM PHOSPHATE, MONOBASIC, MONOHYDRATE 15 MMOL: 276; 142 INJECTION, SOLUTION INTRAVENOUS at 11:44

## 2020-01-29 RX ADMIN — POTASSIUM CHLORIDE 10 MEQ: 7.46 INJECTION, SOLUTION INTRAVENOUS at 00:29

## 2020-01-29 RX ADMIN — SODIUM CHLORIDE 12.5 MG/HR: 9 INJECTION, SOLUTION INTRAVENOUS at 17:31

## 2020-01-29 RX ADMIN — CHLORHEXIDINE GLUCONATE 0.12% ORAL RINSE 15 ML: 1.2 LIQUID ORAL at 11:45

## 2020-01-29 RX ADMIN — PROPOFOL 30 MCG/KG/MIN: 10 INJECTION, EMULSION INTRAVENOUS at 22:12

## 2020-01-29 RX ADMIN — SODIUM CHLORIDE 12.5 MG/HR: 9 INJECTION, SOLUTION INTRAVENOUS at 06:03

## 2020-01-29 RX ADMIN — IPRATROPIUM BROMIDE AND ALBUTEROL SULFATE 3 ML: 2.5; .5 SOLUTION RESPIRATORY (INHALATION) at 15:07

## 2020-01-29 RX ADMIN — CHLORHEXIDINE GLUCONATE 0.12% ORAL RINSE 15 ML: 1.2 LIQUID ORAL at 20:03

## 2020-01-29 RX ADMIN — SODIUM CHLORIDE 12.5 MG/HR: 9 INJECTION, SOLUTION INTRAVENOUS at 08:45

## 2020-01-29 RX ADMIN — PROPOFOL 30 MCG/KG/MIN: 10 INJECTION, EMULSION INTRAVENOUS at 19:10

## 2020-01-29 RX ADMIN — SODIUM CHLORIDE 10 MG/HR: 9 INJECTION, SOLUTION INTRAVENOUS at 20:03

## 2020-01-29 RX ADMIN — SODIUM CHLORIDE 12.5 MG/HR: 9 INJECTION, SOLUTION INTRAVENOUS at 13:01

## 2020-01-29 RX ADMIN — IPRATROPIUM BROMIDE AND ALBUTEROL SULFATE 3 ML: 2.5; .5 SOLUTION RESPIRATORY (INHALATION) at 03:00

## 2020-01-29 RX ADMIN — SODIUM CHLORIDE 7.5 MG/HR: 9 INJECTION, SOLUTION INTRAVENOUS at 22:11

## 2020-01-29 RX ADMIN — IPRATROPIUM BROMIDE AND ALBUTEROL SULFATE 3 ML: 2.5; .5 SOLUTION RESPIRATORY (INHALATION) at 06:49

## 2020-01-29 RX ADMIN — PROPOFOL 35 MCG/KG/MIN: 10 INJECTION, EMULSION INTRAVENOUS at 17:00

## 2020-01-29 RX ADMIN — SODIUM CHLORIDE 12.5 MG/HR: 9 INJECTION, SOLUTION INTRAVENOUS at 03:18

## 2020-01-29 RX ADMIN — POTASSIUM CHLORIDE 10 MEQ: 7.46 INJECTION, SOLUTION INTRAVENOUS at 03:19

## 2020-01-29 RX ADMIN — SODIUM CHLORIDE, PRESERVATIVE FREE 10 ML: 5 INJECTION INTRAVENOUS at 20:03

## 2020-01-29 RX ADMIN — SODIUM CHLORIDE, PRESERVATIVE FREE 3 ML: 5 INJECTION INTRAVENOUS at 20:03

## 2020-01-29 RX ADMIN — SODIUM CHLORIDE 12.5 MG/HR: 9 INJECTION, SOLUTION INTRAVENOUS at 10:39

## 2020-01-29 RX ADMIN — IPRATROPIUM BROMIDE AND ALBUTEROL SULFATE 3 ML: 2.5; .5 SOLUTION RESPIRATORY (INHALATION) at 19:16

## 2020-01-29 RX ADMIN — SODIUM CHLORIDE 12.5 MG/HR: 9 INJECTION, SOLUTION INTRAVENOUS at 15:00

## 2020-01-29 RX ADMIN — SODIUM CHLORIDE 12.5 MG/HR: 9 INJECTION, SOLUTION INTRAVENOUS at 01:10

## 2020-01-29 RX ADMIN — POTASSIUM CHLORIDE 10 MEQ: 7.46 INJECTION, SOLUTION INTRAVENOUS at 02:15

## 2020-01-29 RX ADMIN — IPRATROPIUM BROMIDE AND ALBUTEROL SULFATE 3 ML: 2.5; .5 SOLUTION RESPIRATORY (INHALATION) at 23:13

## 2020-01-30 ENCOUNTER — APPOINTMENT (OUTPATIENT)
Dept: CT IMAGING | Facility: HOSPITAL | Age: 50
End: 2020-01-30

## 2020-01-30 ENCOUNTER — APPOINTMENT (OUTPATIENT)
Dept: GENERAL RADIOLOGY | Facility: HOSPITAL | Age: 50
End: 2020-01-30

## 2020-01-30 ENCOUNTER — ANESTHESIA EVENT (OUTPATIENT)
Dept: PERIOP | Facility: HOSPITAL | Age: 50
End: 2020-01-30

## 2020-01-30 ENCOUNTER — ANESTHESIA (OUTPATIENT)
Dept: PERIOP | Facility: HOSPITAL | Age: 50
End: 2020-01-30

## 2020-01-30 LAB
ANION GAP SERPL CALCULATED.3IONS-SCNC: 13 MMOL/L (ref 5–15)
ARTERIAL PATENCY WRIST A: ABNORMAL
ATMOSPHERIC PRESS: ABNORMAL MM[HG]
BASE EXCESS BLDA CALC-SCNC: 3.8 MMOL/L (ref 0–2)
BASOPHILS # BLD AUTO: 0.02 10*3/MM3 (ref 0–0.2)
BASOPHILS NFR BLD AUTO: 0.2 % (ref 0–1.5)
BDY SITE: ABNORMAL
BODY TEMPERATURE: 37 C
BUN BLD-MCNC: 9 MG/DL (ref 6–20)
BUN/CREAT SERPL: 22.5 (ref 7–25)
CALCIUM SPEC-SCNC: 8.6 MG/DL (ref 8.6–10.5)
CHLORIDE SERPL-SCNC: 93 MMOL/L (ref 98–107)
CO2 BLDA-SCNC: 28.6 MMOL/L (ref 22–33)
CO2 SERPL-SCNC: 25 MMOL/L (ref 22–29)
COHGB MFR BLD: 1.6 % (ref 0–2)
CREAT BLD-MCNC: 0.4 MG/DL (ref 0.57–1)
CRP SERPL-MCNC: 8.81 MG/DL (ref 0–0.5)
DEPRECATED RDW RBC AUTO: 43.8 FL (ref 37–54)
EOSINOPHIL # BLD AUTO: 0 10*3/MM3 (ref 0–0.4)
EOSINOPHIL NFR BLD AUTO: 0 % (ref 0.3–6.2)
ERYTHROCYTE [DISTWIDTH] IN BLOOD BY AUTOMATED COUNT: 13 % (ref 12.3–15.4)
GFR SERPL CREATININE-BSD FRML MDRD: >150 ML/MIN/1.73
GLUCOSE BLD-MCNC: 145 MG/DL (ref 65–99)
GLUCOSE BLDC GLUCOMTR-MCNC: 130 MG/DL (ref 70–130)
GLUCOSE BLDC GLUCOMTR-MCNC: 167 MG/DL (ref 70–130)
GLUCOSE BLDC GLUCOMTR-MCNC: 175 MG/DL (ref 70–130)
GLUCOSE BLDC GLUCOMTR-MCNC: 197 MG/DL (ref 70–130)
HCO3 BLDA-SCNC: 27.5 MMOL/L (ref 20–26)
HCT VFR BLD AUTO: 36.2 % (ref 34–46.6)
HCT VFR BLD CALC: 36 %
HGB BLD-MCNC: 11.9 G/DL (ref 12–15.9)
HGB BLDA-MCNC: 11.7 G/DL (ref 14–18)
HOROWITZ INDEX BLD+IHG-RTO: 40 %
IMM GRANULOCYTES # BLD AUTO: 0.08 10*3/MM3 (ref 0–0.05)
IMM GRANULOCYTES NFR BLD AUTO: 0.6 % (ref 0–0.5)
LYMPHOCYTES # BLD AUTO: 0.82 10*3/MM3 (ref 0.7–3.1)
LYMPHOCYTES NFR BLD AUTO: 6.5 % (ref 19.6–45.3)
MAGNESIUM SERPL-MCNC: 2.4 MG/DL (ref 1.6–2.6)
MCH RBC QN AUTO: 30.3 PG (ref 26.6–33)
MCHC RBC AUTO-ENTMCNC: 32.9 G/DL (ref 31.5–35.7)
MCV RBC AUTO: 92.1 FL (ref 79–97)
METHGB BLD QL: 0.9 % (ref 0–1.5)
MODALITY: ABNORMAL
MONOCYTES # BLD AUTO: 1.16 10*3/MM3 (ref 0.1–0.9)
MONOCYTES NFR BLD AUTO: 9.2 % (ref 5–12)
NEUTROPHILS # BLD AUTO: 10.58 10*3/MM3 (ref 1.7–7)
NEUTROPHILS NFR BLD AUTO: 83.5 % (ref 42.7–76)
NOTE: ABNORMAL
NRBC BLD AUTO-RTO: 0 /100 WBC (ref 0–0.2)
OXYHGB MFR BLDV: 95.9 % (ref 94–99)
PCO2 BLDA: 37.2 MM HG (ref 35–45)
PCO2 TEMP ADJ BLD: 37.2 MM HG (ref 35–45)
PEEP RESPIRATORY: 5 CM[H2O]
PH BLDA: 7.48 PH UNITS (ref 7.35–7.45)
PH, TEMP CORRECTED: 7.48 PH UNITS
PHOSPHATE SERPL-MCNC: 1.5 MG/DL (ref 2.5–4.5)
PHOSPHATE SERPL-MCNC: 2 MG/DL (ref 2.5–4.5)
PHOSPHATE SERPL-MCNC: 2.2 MG/DL (ref 2.5–4.5)
PLATELET # BLD AUTO: 260 10*3/MM3 (ref 140–450)
PMV BLD AUTO: 10 FL (ref 6–12)
PO2 BLDA: 88.8 MM HG (ref 83–108)
PO2 TEMP ADJ BLD: 88.8 MM HG (ref 83–108)
POTASSIUM BLD-SCNC: 3.3 MMOL/L (ref 3.5–5.2)
POTASSIUM BLD-SCNC: 4 MMOL/L (ref 3.5–5.2)
RBC # BLD AUTO: 3.93 10*6/MM3 (ref 3.77–5.28)
SODIUM BLD-SCNC: 131 MMOL/L (ref 136–145)
VENTILATOR MODE: ABNORMAL
WBC NRBC COR # BLD: 12.66 10*3/MM3 (ref 3.4–10.8)

## 2020-01-30 PROCEDURE — 94799 UNLISTED PULMONARY SVC/PX: CPT

## 2020-01-30 PROCEDURE — 63710000001 INSULIN REGULAR HUMAN PER 5 UNITS: Performed by: PHYSICIAN ASSISTANT

## 2020-01-30 PROCEDURE — 84100 ASSAY OF PHOSPHORUS: CPT | Performed by: NURSE PRACTITIONER

## 2020-01-30 PROCEDURE — 74018 RADEX ABDOMEN 1 VIEW: CPT

## 2020-01-30 PROCEDURE — 25010000002 PHENYLEPHRINE PER 1 ML: Performed by: NURSE ANESTHETIST, CERTIFIED REGISTERED

## 2020-01-30 PROCEDURE — 25010000002 ONDANSETRON PER 1 MG: Performed by: NURSE ANESTHETIST, CERTIFIED REGISTERED

## 2020-01-30 PROCEDURE — 80048 BASIC METABOLIC PNL TOTAL CA: CPT | Performed by: INTERNAL MEDICINE

## 2020-01-30 PROCEDURE — 85025 COMPLETE CBC W/AUTO DIFF WBC: CPT | Performed by: INTERNAL MEDICINE

## 2020-01-30 PROCEDURE — 84132 ASSAY OF SERUM POTASSIUM: CPT | Performed by: INTERNAL MEDICINE

## 2020-01-30 PROCEDURE — 84100 ASSAY OF PHOSPHORUS: CPT | Performed by: INTERNAL MEDICINE

## 2020-01-30 PROCEDURE — 25010000002 FENTANYL CITRATE (PF) 100 MCG/2ML SOLUTION: Performed by: NURSE ANESTHETIST, CERTIFIED REGISTERED

## 2020-01-30 PROCEDURE — 36600 WITHDRAWAL OF ARTERIAL BLOOD: CPT

## 2020-01-30 PROCEDURE — 83735 ASSAY OF MAGNESIUM: CPT | Performed by: INTERNAL MEDICINE

## 2020-01-30 PROCEDURE — 25010000002 PROPOFOL 10 MG/ML EMULSION: Performed by: PHYSICIAN ASSISTANT

## 2020-01-30 PROCEDURE — C1713 ANCHOR/SCREW BN/BN,TIS/BN: HCPCS | Performed by: NEUROLOGICAL SURGERY

## 2020-01-30 PROCEDURE — 25010000002 DEXAMETHASONE PER 1 MG: Performed by: NURSE ANESTHETIST, CERTIFIED REGISTERED

## 2020-01-30 PROCEDURE — 82805 BLOOD GASES W/O2 SATURATION: CPT

## 2020-01-30 PROCEDURE — 70450 CT HEAD/BRAIN W/O DYE: CPT

## 2020-01-30 PROCEDURE — 94770: CPT

## 2020-01-30 PROCEDURE — 82962 GLUCOSE BLOOD TEST: CPT

## 2020-01-30 PROCEDURE — 00C00ZZ EXTIRPATION OF MATTER FROM BRAIN, OPEN APPROACH: ICD-10-PCS | Performed by: NEUROLOGICAL SURGERY

## 2020-01-30 PROCEDURE — 99291 CRITICAL CARE FIRST HOUR: CPT | Performed by: INTERNAL MEDICINE

## 2020-01-30 PROCEDURE — 61312 CRNEC/CRNOT STTL XDRL/SDRL: CPT | Performed by: NEUROLOGICAL SURGERY

## 2020-01-30 PROCEDURE — 86140 C-REACTIVE PROTEIN: CPT | Performed by: INTERNAL MEDICINE

## 2020-01-30 PROCEDURE — 61781 SCAN PROC CRANIAL INTRA: CPT | Performed by: NEUROLOGICAL SURGERY

## 2020-01-30 PROCEDURE — 25010000002 PROPOFOL 10 MG/ML EMULSION: Performed by: NURSE ANESTHETIST, CERTIFIED REGISTERED

## 2020-01-30 PROCEDURE — 71045 X-RAY EXAM CHEST 1 VIEW: CPT

## 2020-01-30 PROCEDURE — 94003 VENT MGMT INPAT SUBQ DAY: CPT

## 2020-01-30 PROCEDURE — 25010000003 POTASSIUM CHLORIDE 10 MEQ/100ML SOLUTION: Performed by: PHYSICIAN ASSISTANT

## 2020-01-30 DEVICE — PLT MATRIXNEURO STR TI 2HL 12MM: Type: IMPLANTABLE DEVICE | Site: CRANIAL | Status: FUNCTIONAL

## 2020-01-30 DEVICE — WAX BONE HEMO AESCULAP 2.5GM: Type: IMPLANTABLE DEVICE | Site: CRANIAL | Status: FUNCTIONAL

## 2020-01-30 DEVICE — SCRW MATRIXNEURO SD TI 4MM: Type: IMPLANTABLE DEVICE | Site: CRANIAL | Status: FUNCTIONAL

## 2020-01-30 DEVICE — PLT CVR BURHL MATRIXNEURO TI 17MM: Type: IMPLANTABLE DEVICE | Site: CRANIAL | Status: FUNCTIONAL

## 2020-01-30 RX ORDER — MAGNESIUM HYDROXIDE 1200 MG/15ML
LIQUID ORAL AS NEEDED
Status: DISCONTINUED | OUTPATIENT
Start: 2020-01-30 | End: 2020-01-30 | Stop reason: HOSPADM

## 2020-01-30 RX ORDER — ONDANSETRON 2 MG/ML
4 INJECTION INTRAMUSCULAR; INTRAVENOUS ONCE AS NEEDED
Status: DISCONTINUED | OUTPATIENT
Start: 2020-01-30 | End: 2020-02-03

## 2020-01-30 RX ORDER — FENTANYL CITRATE 50 UG/ML
INJECTION, SOLUTION INTRAMUSCULAR; INTRAVENOUS AS NEEDED
Status: DISCONTINUED | OUTPATIENT
Start: 2020-01-30 | End: 2020-01-30 | Stop reason: SURG

## 2020-01-30 RX ORDER — SODIUM CHLORIDE, SODIUM LACTATE, POTASSIUM CHLORIDE, CALCIUM CHLORIDE 600; 310; 30; 20 MG/100ML; MG/100ML; MG/100ML; MG/100ML
INJECTION, SOLUTION INTRAVENOUS CONTINUOUS PRN
Status: DISCONTINUED | OUTPATIENT
Start: 2020-01-30 | End: 2020-01-30 | Stop reason: SURG

## 2020-01-30 RX ORDER — LIDOCAINE HYDROCHLORIDE AND EPINEPHRINE 5; 5 MG/ML; UG/ML
INJECTION, SOLUTION INFILTRATION; PERINEURAL AS NEEDED
Status: DISCONTINUED | OUTPATIENT
Start: 2020-01-30 | End: 2020-01-30 | Stop reason: HOSPADM

## 2020-01-30 RX ORDER — ROCURONIUM BROMIDE 10 MG/ML
INJECTION, SOLUTION INTRAVENOUS AS NEEDED
Status: DISCONTINUED | OUTPATIENT
Start: 2020-01-30 | End: 2020-01-30 | Stop reason: SURG

## 2020-01-30 RX ORDER — ONDANSETRON 2 MG/ML
INJECTION INTRAMUSCULAR; INTRAVENOUS AS NEEDED
Status: DISCONTINUED | OUTPATIENT
Start: 2020-01-30 | End: 2020-01-30 | Stop reason: SURG

## 2020-01-30 RX ORDER — DEXAMETHASONE SODIUM PHOSPHATE 4 MG/ML
INJECTION, SOLUTION INTRA-ARTICULAR; INTRALESIONAL; INTRAMUSCULAR; INTRAVENOUS; SOFT TISSUE AS NEEDED
Status: DISCONTINUED | OUTPATIENT
Start: 2020-01-30 | End: 2020-01-30 | Stop reason: SURG

## 2020-01-30 RX ORDER — ESMOLOL HYDROCHLORIDE 10 MG/ML
INJECTION INTRAVENOUS AS NEEDED
Status: DISCONTINUED | OUTPATIENT
Start: 2020-01-30 | End: 2020-01-30 | Stop reason: SURG

## 2020-01-30 RX ORDER — IPRATROPIUM BROMIDE AND ALBUTEROL SULFATE 2.5; .5 MG/3ML; MG/3ML
3 SOLUTION RESPIRATORY (INHALATION) EVERY 4 HOURS PRN
Status: DISCONTINUED | OUTPATIENT
Start: 2020-01-30 | End: 2020-02-05

## 2020-01-30 RX ORDER — PROPOFOL 10 MG/ML
VIAL (ML) INTRAVENOUS AS NEEDED
Status: DISCONTINUED | OUTPATIENT
Start: 2020-01-30 | End: 2020-01-30 | Stop reason: SURG

## 2020-01-30 RX ORDER — SODIUM CHLORIDE 9 MG/ML
75 INJECTION, SOLUTION INTRAVENOUS CONTINUOUS
Status: DISCONTINUED | OUTPATIENT
Start: 2020-01-30 | End: 2020-02-01

## 2020-01-30 RX ORDER — GINSENG 100 MG
CAPSULE ORAL AS NEEDED
Status: DISCONTINUED | OUTPATIENT
Start: 2020-01-30 | End: 2020-01-30 | Stop reason: HOSPADM

## 2020-01-30 RX ORDER — FENTANYL CITRATE 50 UG/ML
50 INJECTION, SOLUTION INTRAMUSCULAR; INTRAVENOUS
Status: DISCONTINUED | OUTPATIENT
Start: 2020-01-30 | End: 2020-02-03

## 2020-01-30 RX ADMIN — INSULIN HUMAN 2 UNITS: 100 INJECTION, SOLUTION PARENTERAL at 12:03

## 2020-01-30 RX ADMIN — SODIUM CHLORIDE, PRESERVATIVE FREE 10 ML: 5 INJECTION INTRAVENOUS at 20:00

## 2020-01-30 RX ADMIN — POTASSIUM CHLORIDE 10 MEQ: 7.46 INJECTION, SOLUTION INTRAVENOUS at 07:24

## 2020-01-30 RX ADMIN — PROPOFOL 30 MCG/KG/MIN: 10 INJECTION, EMULSION INTRAVENOUS at 08:30

## 2020-01-30 RX ADMIN — FENTANYL CITRATE 150 MCG: 50 INJECTION, SOLUTION INTRAMUSCULAR; INTRAVENOUS at 10:47

## 2020-01-30 RX ADMIN — SODIUM CHLORIDE, POTASSIUM CHLORIDE, SODIUM LACTATE AND CALCIUM CHLORIDE: 600; 310; 30; 20 INJECTION, SOLUTION INTRAVENOUS at 09:54

## 2020-01-30 RX ADMIN — IPRATROPIUM BROMIDE AND ALBUTEROL SULFATE 3 ML: 2.5; .5 SOLUTION RESPIRATORY (INHALATION) at 02:51

## 2020-01-30 RX ADMIN — CHLORHEXIDINE GLUCONATE 0.12% ORAL RINSE 15 ML: 1.2 LIQUID ORAL at 08:29

## 2020-01-30 RX ADMIN — ROCURONIUM BROMIDE 50 MG: 10 INJECTION INTRAVENOUS at 11:05

## 2020-01-30 RX ADMIN — POTASSIUM & SODIUM PHOSPHATES POWDER PACK 280-160-250 MG 2 PACKET: 280-160-250 PACK at 12:51

## 2020-01-30 RX ADMIN — PANTOPRAZOLE SODIUM 40 MG: 40 INJECTION, POWDER, FOR SOLUTION INTRAVENOUS at 08:29

## 2020-01-30 RX ADMIN — ONDANSETRON 4 MG: 2 INJECTION INTRAMUSCULAR; INTRAVENOUS at 11:05

## 2020-01-30 RX ADMIN — POTASSIUM CHLORIDE 10 MEQ: 7.46 INJECTION, SOLUTION INTRAVENOUS at 09:25

## 2020-01-30 RX ADMIN — POTASSIUM CHLORIDE 10 MEQ: 7.46 INJECTION, SOLUTION INTRAVENOUS at 08:29

## 2020-01-30 RX ADMIN — PROPOFOL 25 MCG/KG/MIN: 10 INJECTION, EMULSION INTRAVENOUS at 21:48

## 2020-01-30 RX ADMIN — PROPOFOL 25 MCG/KG/MIN: 10 INJECTION, EMULSION INTRAVENOUS at 05:55

## 2020-01-30 RX ADMIN — SODIUM CHLORIDE 75 ML/HR: 9 INJECTION, SOLUTION INTRAVENOUS at 17:36

## 2020-01-30 RX ADMIN — SODIUM CHLORIDE 75 ML/HR: 9 INJECTION, SOLUTION INTRAVENOUS at 09:23

## 2020-01-30 RX ADMIN — PROPOFOL 25 MCG/KG/MIN: 10 INJECTION, EMULSION INTRAVENOUS at 17:36

## 2020-01-30 RX ADMIN — PROPOFOL 100 MG: 10 INJECTION, EMULSION INTRAVENOUS at 09:59

## 2020-01-30 RX ADMIN — ROCURONIUM BROMIDE 50 MG: 10 INJECTION INTRAVENOUS at 09:59

## 2020-01-30 RX ADMIN — SODIUM CHLORIDE, PRESERVATIVE FREE 10 ML: 5 INJECTION INTRAVENOUS at 08:30

## 2020-01-30 RX ADMIN — SODIUM CHLORIDE 5 MG/HR: 9 INJECTION, SOLUTION INTRAVENOUS at 03:52

## 2020-01-30 RX ADMIN — INSULIN HUMAN 2 UNITS: 100 INJECTION, SOLUTION PARENTERAL at 17:57

## 2020-01-30 RX ADMIN — PHENYLEPHRINE HYDROCHLORIDE 100 MCG: 10 INJECTION INTRAVENOUS at 10:37

## 2020-01-30 RX ADMIN — PROPOFOL 30 MCG/KG/MIN: 10 INJECTION, EMULSION INTRAVENOUS at 02:02

## 2020-01-30 RX ADMIN — SODIUM CHLORIDE 5 MG/HR: 9 INJECTION, SOLUTION INTRAVENOUS at 08:30

## 2020-01-30 RX ADMIN — ESMOLOL HYDROCHLORIDE 30 MG: 10 INJECTION INTRAVENOUS at 10:02

## 2020-01-30 RX ADMIN — IPRATROPIUM BROMIDE AND ALBUTEROL SULFATE 3 ML: 2.5; .5 SOLUTION RESPIRATORY (INHALATION) at 12:08

## 2020-01-30 RX ADMIN — IPRATROPIUM BROMIDE AND ALBUTEROL SULFATE 3 ML: 2.5; .5 SOLUTION RESPIRATORY (INHALATION) at 07:10

## 2020-01-30 RX ADMIN — DEXAMETHASONE SODIUM PHOSPHATE 8 MG: 4 INJECTION, SOLUTION INTRAMUSCULAR; INTRAVENOUS at 10:10

## 2020-01-30 RX ADMIN — POTASSIUM CHLORIDE 40 MEQ: 1.5 POWDER, FOR SOLUTION ORAL at 12:32

## 2020-01-30 RX ADMIN — FENTANYL CITRATE 100 MCG: 50 INJECTION, SOLUTION INTRAMUSCULAR; INTRAVENOUS at 10:04

## 2020-01-30 RX ADMIN — CHLORHEXIDINE GLUCONATE 0.12% ORAL RINSE 15 ML: 1.2 LIQUID ORAL at 20:00

## 2020-01-30 RX ADMIN — POTASSIUM & SODIUM PHOSPHATES POWDER PACK 280-160-250 MG 2 PACKET: 280-160-250 PACK at 19:58

## 2020-01-30 NOTE — ANESTHESIA PREPROCEDURE EVALUATION
Anesthesia Evaluation                  Airway   Mallampati: II  Dental      Pulmonary    (+) sleep apnea,   Cardiovascular     (+) hypertension,       Neuro/Psych  GI/Hepatic/Renal/Endo      Musculoskeletal     Abdominal    Substance History      OB/GYN          Other                        Anesthesia Plan    ASA 5     general     intravenous induction     Anesthetic plan, all risks, benefits, and alternatives have been provided, discussed and informed consent has been obtained with: patient.

## 2020-01-30 NOTE — ANESTHESIA POSTPROCEDURE EVALUATION
Patient: Yee Goodwin    Procedure Summary     Date:  01/30/20 Room / Location:   [facility] /  [facility]    Anesthesia Start:  0954 Anesthesia Stop:  1128    Procedure:  LEFT FRONTAL CRANIOTOMY FOR EVACUATION OF INTRAPARENCHYMAL HEMATOMA (Left Head) Diagnosis:       Acute intracerebral hemorrhage (CMS/HCC)      (Acute intracerebral hemorrhage (CMS/HCC) [I61.9])    Surgeon:  Jose Cole MD Provider:  Rito Mullen MD    Anesthesia Type:  general ASA Status:  5          Anesthesia Type: general    Vitals  Vitals Value Taken Time   /61 1/30/2020 11:25 AM   Temp     Pulse 85 1/30/2020 11:26 AM   Resp     SpO2 96 % 1/30/2020 11:27 AM   Vitals shown include unvalidated device data.        Post Anesthesia Care and Evaluation    Patient location during evaluation: PACU  Patient participation: complete - patient cannot participate  Pain score: 0  Pain management: adequate  Airway patency: patent  Anesthetic complications: No anesthetic complications  PONV Status: none  Cardiovascular status: hemodynamically stable and acceptable  Respiratory status: ETT and ventilator  Hydration status: acceptable

## 2020-01-31 ENCOUNTER — APPOINTMENT (OUTPATIENT)
Dept: GENERAL RADIOLOGY | Facility: HOSPITAL | Age: 50
End: 2020-01-31

## 2020-01-31 PROBLEM — E87.6 HYPOKALEMIA: Status: RESOLVED | Noted: 2020-01-28 | Resolved: 2020-01-31

## 2020-01-31 PROBLEM — Z98.890 S/P CRANIOTOMY: Status: ACTIVE | Noted: 2020-01-31

## 2020-01-31 LAB
ALBUMIN SERPL-MCNC: 3.7 G/DL (ref 3.5–5.2)
ALBUMIN/GLOB SERPL: 1.2 G/DL
ALP SERPL-CCNC: 98 U/L (ref 39–117)
ALT SERPL W P-5'-P-CCNC: 153 U/L (ref 1–33)
ANION GAP SERPL CALCULATED.3IONS-SCNC: 13 MMOL/L (ref 5–15)
ARTERIAL PATENCY WRIST A: ABNORMAL
ARTERIAL PATENCY WRIST A: ABNORMAL
AST SERPL-CCNC: 92 U/L (ref 1–32)
ATMOSPHERIC PRESS: ABNORMAL MM[HG]
ATMOSPHERIC PRESS: ABNORMAL MM[HG]
BACTERIA SPEC RESP CULT: NORMAL
BASE EXCESS BLDA CALC-SCNC: 5.2 MMOL/L (ref 0–2)
BASE EXCESS BLDA CALC-SCNC: 5.4 MMOL/L (ref 0–2)
BASOPHILS # BLD AUTO: 0.01 10*3/MM3 (ref 0–0.2)
BASOPHILS NFR BLD AUTO: 0.1 % (ref 0–1.5)
BDY SITE: ABNORMAL
BDY SITE: ABNORMAL
BILIRUB SERPL-MCNC: 0.4 MG/DL (ref 0.2–1.2)
BODY TEMPERATURE: 37 C
BODY TEMPERATURE: 37 C
BUN BLD-MCNC: 15 MG/DL (ref 6–20)
BUN/CREAT SERPL: 34.9 (ref 7–25)
CALCIUM SPEC-SCNC: 8.6 MG/DL (ref 8.6–10.5)
CHLORIDE SERPL-SCNC: 103 MMOL/L (ref 98–107)
CO2 BLDA-SCNC: 29.8 MMOL/L (ref 22–33)
CO2 BLDA-SCNC: 30.6 MMOL/L (ref 22–33)
CO2 SERPL-SCNC: 26 MMOL/L (ref 22–29)
COHGB MFR BLD: 1.8 % (ref 0–2)
COHGB MFR BLD: 1.9 % (ref 0–2)
CREAT BLD-MCNC: 0.43 MG/DL (ref 0.57–1)
DEPRECATED RDW RBC AUTO: 46.2 FL (ref 37–54)
EOSINOPHIL # BLD AUTO: 0 10*3/MM3 (ref 0–0.4)
EOSINOPHIL NFR BLD AUTO: 0 % (ref 0.3–6.2)
EPAP: 0
EPAP: 0
ERYTHROCYTE [DISTWIDTH] IN BLOOD BY AUTOMATED COUNT: 13.1 % (ref 12.3–15.4)
GFR SERPL CREATININE-BSD FRML MDRD: >150 ML/MIN/1.73
GLOBULIN UR ELPH-MCNC: 3.2 GM/DL
GLUCOSE BLD-MCNC: 189 MG/DL (ref 65–99)
GLUCOSE BLDC GLUCOMTR-MCNC: 129 MG/DL (ref 70–130)
GLUCOSE BLDC GLUCOMTR-MCNC: 137 MG/DL (ref 70–130)
GLUCOSE BLDC GLUCOMTR-MCNC: 148 MG/DL (ref 70–130)
GLUCOSE BLDC GLUCOMTR-MCNC: 184 MG/DL (ref 70–130)
GRAM STN SPEC: NORMAL
HCO3 BLDA-SCNC: 28.6 MMOL/L (ref 20–26)
HCO3 BLDA-SCNC: 29.4 MMOL/L (ref 20–26)
HCT VFR BLD AUTO: 34.3 % (ref 34–46.6)
HCT VFR BLD CALC: 35.3 %
HCT VFR BLD CALC: 36.4 %
HGB BLD-MCNC: 11.3 G/DL (ref 12–15.9)
HGB BLDA-MCNC: 11.5 G/DL (ref 14–18)
HGB BLDA-MCNC: 11.9 G/DL (ref 14–18)
HOROWITZ INDEX BLD+IHG-RTO: 30 %
HOROWITZ INDEX BLD+IHG-RTO: 35 %
IMM GRANULOCYTES # BLD AUTO: 0.18 10*3/MM3 (ref 0–0.05)
IMM GRANULOCYTES NFR BLD AUTO: 1.3 % (ref 0–0.5)
IPAP: 0
IPAP: 0
LYMPHOCYTES # BLD AUTO: 0.59 10*3/MM3 (ref 0.7–3.1)
LYMPHOCYTES NFR BLD AUTO: 4.3 % (ref 19.6–45.3)
MAGNESIUM SERPL-MCNC: 2.9 MG/DL (ref 1.6–2.6)
MCH RBC QN AUTO: 31.5 PG (ref 26.6–33)
MCHC RBC AUTO-ENTMCNC: 32.9 G/DL (ref 31.5–35.7)
MCV RBC AUTO: 95.5 FL (ref 79–97)
METHGB BLD QL: 0.6 % (ref 0–1.5)
METHGB BLD QL: 0.6 % (ref 0–1.5)
MODALITY: ABNORMAL
MODALITY: ABNORMAL
MONOCYTES # BLD AUTO: 1.24 10*3/MM3 (ref 0.1–0.9)
MONOCYTES NFR BLD AUTO: 9 % (ref 5–12)
NEUTROPHILS # BLD AUTO: 11.81 10*3/MM3 (ref 1.7–7)
NEUTROPHILS NFR BLD AUTO: 85.3 % (ref 42.7–76)
NOTE: ABNORMAL
NOTE: ABNORMAL
NRBC BLD AUTO-RTO: 0 /100 WBC (ref 0–0.2)
OXYHGB MFR BLDV: 92.6 % (ref 94–99)
OXYHGB MFR BLDV: 93 % (ref 94–99)
PAW @ PEAK INSP FLOW SETTING VENT: 0 CMH2O
PAW @ PEAK INSP FLOW SETTING VENT: 0 CMH2O
PCO2 BLDA: 36.3 MM HG (ref 35–45)
PCO2 BLDA: 40.8 MM HG (ref 35–45)
PCO2 TEMP ADJ BLD: 36.3 MM HG (ref 35–45)
PCO2 TEMP ADJ BLD: 40.8 MM HG (ref 35–45)
PEEP RESPIRATORY: 5 CM[H2O]
PH BLDA: 7.46 PH UNITS (ref 7.35–7.45)
PH BLDA: 7.51 PH UNITS (ref 7.35–7.45)
PH, TEMP CORRECTED: 7.46 PH UNITS
PH, TEMP CORRECTED: 7.51 PH UNITS
PHOSPHATE SERPL-MCNC: 1.5 MG/DL (ref 2.5–4.5)
PLATELET # BLD AUTO: 267 10*3/MM3 (ref 140–450)
PMV BLD AUTO: 10 FL (ref 6–12)
PO2 BLDA: 63.1 MM HG (ref 83–108)
PO2 BLDA: 67.4 MM HG (ref 83–108)
PO2 TEMP ADJ BLD: 63.1 MM HG (ref 83–108)
PO2 TEMP ADJ BLD: 67.4 MM HG (ref 83–108)
POTASSIUM BLD-SCNC: 3.6 MMOL/L (ref 3.5–5.2)
PROT SERPL-MCNC: 6.9 G/DL (ref 6–8.5)
PSV: 10 CMH2O
RBC # BLD AUTO: 3.59 10*6/MM3 (ref 3.77–5.28)
SODIUM BLD-SCNC: 142 MMOL/L (ref 136–145)
TOTAL RATE: 0 BREATHS/MINUTE
TOTAL RATE: 0 BREATHS/MINUTE
VENTILATOR MODE: ABNORMAL
WBC NRBC COR # BLD: 13.83 10*3/MM3 (ref 3.4–10.8)

## 2020-01-31 PROCEDURE — 99024 POSTOP FOLLOW-UP VISIT: CPT | Performed by: NEUROLOGICAL SURGERY

## 2020-01-31 PROCEDURE — 25010000002 HYDROMORPHONE PER 4 MG: Performed by: PHYSICIAN ASSISTANT

## 2020-01-31 PROCEDURE — 74018 RADEX ABDOMEN 1 VIEW: CPT

## 2020-01-31 PROCEDURE — 94799 UNLISTED PULMONARY SVC/PX: CPT

## 2020-01-31 PROCEDURE — 82805 BLOOD GASES W/O2 SATURATION: CPT

## 2020-01-31 PROCEDURE — 36600 WITHDRAWAL OF ARTERIAL BLOOD: CPT

## 2020-01-31 PROCEDURE — 94770: CPT

## 2020-01-31 PROCEDURE — 25010000003 POTASSIUM CHLORIDE 10 MEQ/100ML SOLUTION: Performed by: PHYSICIAN ASSISTANT

## 2020-01-31 PROCEDURE — 92610 EVALUATE SWALLOWING FUNCTION: CPT

## 2020-01-31 PROCEDURE — 84100 ASSAY OF PHOSPHORUS: CPT | Performed by: INTERNAL MEDICINE

## 2020-01-31 PROCEDURE — 92523 SPEECH SOUND LANG COMPREHEN: CPT

## 2020-01-31 PROCEDURE — 71045 X-RAY EXAM CHEST 1 VIEW: CPT

## 2020-01-31 PROCEDURE — 25010000002 PROPOFOL 10 MG/ML EMULSION: Performed by: PHYSICIAN ASSISTANT

## 2020-01-31 PROCEDURE — 80053 COMPREHEN METABOLIC PANEL: CPT | Performed by: INTERNAL MEDICINE

## 2020-01-31 PROCEDURE — 99291 CRITICAL CARE FIRST HOUR: CPT | Performed by: INTERNAL MEDICINE

## 2020-01-31 PROCEDURE — 82962 GLUCOSE BLOOD TEST: CPT

## 2020-01-31 PROCEDURE — 94003 VENT MGMT INPAT SUBQ DAY: CPT

## 2020-01-31 PROCEDURE — 85025 COMPLETE CBC W/AUTO DIFF WBC: CPT | Performed by: INTERNAL MEDICINE

## 2020-01-31 PROCEDURE — 83735 ASSAY OF MAGNESIUM: CPT | Performed by: INTERNAL MEDICINE

## 2020-01-31 RX ORDER — HYDROMORPHONE HYDROCHLORIDE 1 MG/ML
0.25 INJECTION, SOLUTION INTRAMUSCULAR; INTRAVENOUS; SUBCUTANEOUS
Status: DISCONTINUED | OUTPATIENT
Start: 2020-01-31 | End: 2020-02-05

## 2020-01-31 RX ADMIN — SODIUM CHLORIDE 7.5 MG/HR: 9 INJECTION, SOLUTION INTRAVENOUS at 23:27

## 2020-01-31 RX ADMIN — SODIUM CHLORIDE 5 MG/HR: 9 INJECTION, SOLUTION INTRAVENOUS at 08:08

## 2020-01-31 RX ADMIN — POTASSIUM & SODIUM PHOSPHATES POWDER PACK 280-160-250 MG 2 PACKET: 280-160-250 PACK at 09:36

## 2020-01-31 RX ADMIN — PROPOFOL 15 MCG/KG/MIN: 10 INJECTION, EMULSION INTRAVENOUS at 06:14

## 2020-01-31 RX ADMIN — SODIUM CHLORIDE 75 ML/HR: 9 INJECTION, SOLUTION INTRAVENOUS at 21:18

## 2020-01-31 RX ADMIN — PROPOFOL 15 MCG/KG/MIN: 10 INJECTION, EMULSION INTRAVENOUS at 01:55

## 2020-01-31 RX ADMIN — LISINOPRIL 20 MG: 20 TABLET ORAL at 08:08

## 2020-01-31 RX ADMIN — POTASSIUM CHLORIDE 40 MEQ: 1.5 POWDER, FOR SOLUTION ORAL at 08:08

## 2020-01-31 RX ADMIN — SODIUM CHLORIDE, PRESERVATIVE FREE 10 ML: 5 INJECTION INTRAVENOUS at 20:26

## 2020-01-31 RX ADMIN — SODIUM CHLORIDE 7.5 MG/HR: 9 INJECTION, SOLUTION INTRAVENOUS at 13:31

## 2020-01-31 RX ADMIN — SODIUM CHLORIDE 12.5 MG/HR: 9 INJECTION, SOLUTION INTRAVENOUS at 21:15

## 2020-01-31 RX ADMIN — LABETALOL 20 MG/4 ML (5 MG/ML) INTRAVENOUS SYRINGE 20 MG: at 00:57

## 2020-01-31 RX ADMIN — SODIUM CHLORIDE 75 ML/HR: 9 INJECTION, SOLUTION INTRAVENOUS at 06:14

## 2020-01-31 RX ADMIN — INSULIN HUMAN 2 UNITS: 100 INJECTION, SOLUTION PARENTERAL at 00:19

## 2020-01-31 RX ADMIN — SODIUM CHLORIDE, PRESERVATIVE FREE 10 ML: 5 INJECTION INTRAVENOUS at 08:10

## 2020-01-31 RX ADMIN — CHLORHEXIDINE GLUCONATE 0.12% ORAL RINSE 15 ML: 1.2 LIQUID ORAL at 08:08

## 2020-01-31 RX ADMIN — HYDROMORPHONE HYDROCHLORIDE 0.25 MG: 1 INJECTION, SOLUTION INTRAMUSCULAR; INTRAVENOUS; SUBCUTANEOUS at 15:01

## 2020-01-31 RX ADMIN — INSULIN HUMAN 2 UNITS: 100 INJECTION, SOLUTION PARENTERAL at 06:14

## 2020-01-31 RX ADMIN — POTASSIUM CHLORIDE 10 MEQ: 7.46 INJECTION, SOLUTION INTRAVENOUS at 13:31

## 2020-01-31 RX ADMIN — POTASSIUM PHOSPHATE, MONOBASIC AND POTASSIUM PHOSPHATE, DIBASIC 30 MMOL: 224; 236 INJECTION, SOLUTION, CONCENTRATE INTRAVENOUS at 09:36

## 2020-01-31 RX ADMIN — PANTOPRAZOLE SODIUM 40 MG: 40 INJECTION, POWDER, FOR SOLUTION INTRAVENOUS at 08:08

## 2020-01-31 RX ADMIN — POTASSIUM CHLORIDE 10 MEQ: 7.46 INJECTION, SOLUTION INTRAVENOUS at 12:09

## 2020-01-31 RX ADMIN — CHLORHEXIDINE GLUCONATE 0.12% ORAL RINSE 15 ML: 1.2 LIQUID ORAL at 20:26

## 2020-01-31 RX ADMIN — LABETALOL 20 MG/4 ML (5 MG/ML) INTRAVENOUS SYRINGE 20 MG: at 09:35

## 2020-02-01 ENCOUNTER — APPOINTMENT (OUTPATIENT)
Dept: GENERAL RADIOLOGY | Facility: HOSPITAL | Age: 50
End: 2020-02-01

## 2020-02-01 LAB
ALBUMIN SERPL-MCNC: 4 G/DL (ref 3.5–5.2)
ALBUMIN/GLOB SERPL: 1.1 G/DL
ALP SERPL-CCNC: 120 U/L (ref 39–117)
ALT SERPL W P-5'-P-CCNC: 174 U/L (ref 1–33)
ANION GAP SERPL CALCULATED.3IONS-SCNC: 14 MMOL/L (ref 5–15)
AST SERPL-CCNC: 74 U/L (ref 1–32)
BASOPHILS # BLD AUTO: 0.04 10*3/MM3 (ref 0–0.2)
BASOPHILS NFR BLD AUTO: 0.2 % (ref 0–1.5)
BILIRUB SERPL-MCNC: 0.6 MG/DL (ref 0.2–1.2)
BUN BLD-MCNC: 10 MG/DL (ref 6–20)
BUN/CREAT SERPL: 28.6 (ref 7–25)
CALCIUM SPEC-SCNC: 9.2 MG/DL (ref 8.6–10.5)
CHLORIDE SERPL-SCNC: 93 MMOL/L (ref 98–107)
CO2 SERPL-SCNC: 24 MMOL/L (ref 22–29)
CREAT BLD-MCNC: 0.35 MG/DL (ref 0.57–1)
DEPRECATED RDW RBC AUTO: 45.7 FL (ref 37–54)
EOSINOPHIL # BLD AUTO: 0.01 10*3/MM3 (ref 0–0.4)
EOSINOPHIL NFR BLD AUTO: 0.1 % (ref 0.3–6.2)
ERYTHROCYTE [DISTWIDTH] IN BLOOD BY AUTOMATED COUNT: 12.7 % (ref 12.3–15.4)
GFR SERPL CREATININE-BSD FRML MDRD: >150 ML/MIN/1.73
GLOBULIN UR ELPH-MCNC: 3.6 GM/DL
GLUCOSE BLD-MCNC: 113 MG/DL (ref 65–99)
GLUCOSE BLDC GLUCOMTR-MCNC: 110 MG/DL (ref 70–130)
GLUCOSE BLDC GLUCOMTR-MCNC: 110 MG/DL (ref 70–130)
GLUCOSE BLDC GLUCOMTR-MCNC: 132 MG/DL (ref 70–130)
HCT VFR BLD AUTO: 38.5 % (ref 34–46.6)
HGB BLD-MCNC: 12.3 G/DL (ref 12–15.9)
IMM GRANULOCYTES # BLD AUTO: 0.22 10*3/MM3 (ref 0–0.05)
IMM GRANULOCYTES NFR BLD AUTO: 1.3 % (ref 0–0.5)
LYMPHOCYTES # BLD AUTO: 1.76 10*3/MM3 (ref 0.7–3.1)
LYMPHOCYTES NFR BLD AUTO: 10.3 % (ref 19.6–45.3)
MAGNESIUM SERPL-MCNC: 2.2 MG/DL (ref 1.6–2.6)
MCH RBC QN AUTO: 30.9 PG (ref 26.6–33)
MCHC RBC AUTO-ENTMCNC: 31.9 G/DL (ref 31.5–35.7)
MCV RBC AUTO: 96.7 FL (ref 79–97)
MONOCYTES # BLD AUTO: 1.35 10*3/MM3 (ref 0.1–0.9)
MONOCYTES NFR BLD AUTO: 7.9 % (ref 5–12)
NEUTROPHILS # BLD AUTO: 13.77 10*3/MM3 (ref 1.7–7)
NEUTROPHILS NFR BLD AUTO: 80.2 % (ref 42.7–76)
NRBC BLD AUTO-RTO: 0 /100 WBC (ref 0–0.2)
PHOSPHATE SERPL-MCNC: 2.2 MG/DL (ref 2.5–4.5)
PHOSPHATE SERPL-MCNC: 2.4 MG/DL (ref 2.5–4.5)
PLATELET # BLD AUTO: 372 10*3/MM3 (ref 140–450)
PMV BLD AUTO: 9.8 FL (ref 6–12)
POTASSIUM BLD-SCNC: 3.4 MMOL/L (ref 3.5–5.2)
POTASSIUM BLD-SCNC: 3.5 MMOL/L (ref 3.5–5.2)
PROT SERPL-MCNC: 7.6 G/DL (ref 6–8.5)
RBC # BLD AUTO: 3.98 10*6/MM3 (ref 3.77–5.28)
SODIUM BLD-SCNC: 131 MMOL/L (ref 136–145)
WBC NRBC COR # BLD: 17.15 10*3/MM3 (ref 3.4–10.8)

## 2020-02-01 PROCEDURE — 74018 RADEX ABDOMEN 1 VIEW: CPT

## 2020-02-01 PROCEDURE — 84132 ASSAY OF SERUM POTASSIUM: CPT | Performed by: NURSE PRACTITIONER

## 2020-02-01 PROCEDURE — 92507 TX SP LANG VOICE COMM INDIV: CPT

## 2020-02-01 PROCEDURE — 97530 THERAPEUTIC ACTIVITIES: CPT

## 2020-02-01 PROCEDURE — 99024 POSTOP FOLLOW-UP VISIT: CPT | Performed by: NEUROLOGICAL SURGERY

## 2020-02-01 PROCEDURE — 80053 COMPREHEN METABOLIC PANEL: CPT | Performed by: INTERNAL MEDICINE

## 2020-02-01 PROCEDURE — 85025 COMPLETE CBC W/AUTO DIFF WBC: CPT | Performed by: INTERNAL MEDICINE

## 2020-02-01 PROCEDURE — 94799 UNLISTED PULMONARY SVC/PX: CPT

## 2020-02-01 PROCEDURE — 99233 SBSQ HOSP IP/OBS HIGH 50: CPT | Performed by: INTERNAL MEDICINE

## 2020-02-01 PROCEDURE — 25010000002 HYDROMORPHONE PER 4 MG: Performed by: PHYSICIAN ASSISTANT

## 2020-02-01 PROCEDURE — 84100 ASSAY OF PHOSPHORUS: CPT | Performed by: NURSE PRACTITIONER

## 2020-02-01 PROCEDURE — 83735 ASSAY OF MAGNESIUM: CPT | Performed by: INTERNAL MEDICINE

## 2020-02-01 PROCEDURE — 97165 OT EVAL LOW COMPLEX 30 MIN: CPT

## 2020-02-01 PROCEDURE — 92526 ORAL FUNCTION THERAPY: CPT

## 2020-02-01 PROCEDURE — 82962 GLUCOSE BLOOD TEST: CPT

## 2020-02-01 PROCEDURE — 97162 PT EVAL MOD COMPLEX 30 MIN: CPT

## 2020-02-01 PROCEDURE — 84100 ASSAY OF PHOSPHORUS: CPT | Performed by: INTERNAL MEDICINE

## 2020-02-01 RX ORDER — IPRATROPIUM BROMIDE AND ALBUTEROL SULFATE 2.5; .5 MG/3ML; MG/3ML
3 SOLUTION RESPIRATORY (INHALATION)
Status: DISCONTINUED | OUTPATIENT
Start: 2020-02-01 | End: 2020-02-05

## 2020-02-01 RX ORDER — ACETAMINOPHEN 325 MG/1
650 TABLET ORAL EVERY 6 HOURS PRN
Status: DISCONTINUED | OUTPATIENT
Start: 2020-02-01 | End: 2020-02-07 | Stop reason: HOSPADM

## 2020-02-01 RX ORDER — HYDROCHLOROTHIAZIDE 25 MG/1
12.5 TABLET ORAL DAILY
Status: DISCONTINUED | OUTPATIENT
Start: 2020-02-02 | End: 2020-02-02

## 2020-02-01 RX ADMIN — LISINOPRIL 20 MG: 20 TABLET ORAL at 14:17

## 2020-02-01 RX ADMIN — POTASSIUM CHLORIDE 40 MEQ: 1.5 POWDER, FOR SOLUTION ORAL at 22:05

## 2020-02-01 RX ADMIN — IPRATROPIUM BROMIDE AND ALBUTEROL SULFATE 3 ML: 2.5; .5 SOLUTION RESPIRATORY (INHALATION) at 19:30

## 2020-02-01 RX ADMIN — ACETAMINOPHEN 650 MG: 325 TABLET, FILM COATED ORAL at 18:10

## 2020-02-01 RX ADMIN — HYDROMORPHONE HYDROCHLORIDE 0.25 MG: 1 INJECTION, SOLUTION INTRAMUSCULAR; INTRAVENOUS; SUBCUTANEOUS at 11:48

## 2020-02-01 RX ADMIN — PANTOPRAZOLE SODIUM 40 MG: 40 INJECTION, POWDER, FOR SOLUTION INTRAVENOUS at 08:18

## 2020-02-01 RX ADMIN — SODIUM CHLORIDE, PRESERVATIVE FREE 10 ML: 5 INJECTION INTRAVENOUS at 08:20

## 2020-02-01 RX ADMIN — SODIUM CHLORIDE 5 MG/HR: 9 INJECTION, SOLUTION INTRAVENOUS at 15:12

## 2020-02-01 RX ADMIN — ACETAMINOPHEN 650 MG: 650 SUPPOSITORY RECTAL at 01:04

## 2020-02-01 RX ADMIN — HYDROMORPHONE HYDROCHLORIDE 0.25 MG: 1 INJECTION, SOLUTION INTRAMUSCULAR; INTRAVENOUS; SUBCUTANEOUS at 18:10

## 2020-02-01 RX ADMIN — SODIUM CHLORIDE 7.5 MG/HR: 9 INJECTION, SOLUTION INTRAVENOUS at 20:06

## 2020-02-01 RX ADMIN — POTASSIUM CHLORIDE 40 MEQ: 1.5 POWDER, FOR SOLUTION ORAL at 17:51

## 2020-02-01 RX ADMIN — CHLORHEXIDINE GLUCONATE 0.12% ORAL RINSE 15 ML: 1.2 LIQUID ORAL at 08:18

## 2020-02-01 RX ADMIN — SODIUM CHLORIDE 7.5 MG/HR: 9 INJECTION, SOLUTION INTRAVENOUS at 11:23

## 2020-02-01 RX ADMIN — SODIUM CHLORIDE 75 ML/HR: 9 INJECTION, SOLUTION INTRAVENOUS at 10:13

## 2020-02-01 RX ADMIN — HYDROMORPHONE HYDROCHLORIDE 0.25 MG: 1 INJECTION, SOLUTION INTRAMUSCULAR; INTRAVENOUS; SUBCUTANEOUS at 00:30

## 2020-02-01 RX ADMIN — POTASSIUM PHOSPHATE, MONOBASIC AND POTASSIUM PHOSPHATE, DIBASIC 15 MMOL: 224; 236 INJECTION, SOLUTION, CONCENTRATE INTRAVENOUS at 10:12

## 2020-02-01 NOTE — PLAN OF CARE
Problem: Patient Care Overview  Goal: Plan of Care Review  Outcome: Ongoing (interventions implemented as appropriate)  Flowsheets (Taken 2/1/2020 1140)  Consent Given to Review Plan with: Dysphagia/speech therapy

## 2020-02-01 NOTE — PROGRESS NOTES
Nutrition Services    Patient Name:  Yee Goodwin  YOB: 1970  MRN: [id number]  Admit Date:  1/28/2020    RN reports SLP saw pt today for swallow evaluation, pt not ready for instrumental evaluation. NGT placed and enteral nutrition restarted.      Time spent : 15 mins  Electronically signed by:  Ivania Enriquez MS,RD,LD  02/01/20 4:18 PM

## 2020-02-01 NOTE — PLAN OF CARE
Problem: Patient Care Overview  Goal: Plan of Care Review  Outcome: Ongoing (interventions implemented as appropriate)  Flowsheets (Taken 2/1/2020 0826)  Progress: improving  Plan of Care Reviewed With: patient; significant other  Outcome Summary: Pt improving overall, alert and able to give a thumbs up appropriately. No movement in RUE/RLE. Purposeful movement in LUE/LLE. Right facial droop remains. NG tube placed and tube feedings initated. Dr. Cole spoke with patient and significant other about possibility of PEG tube placement this week due to inablility to swallow. PO BP medications restarted and cardene titrated down. Worked with PT today, used lift to sling patient to chair and patient remained in chair remainder of shift. Vitals stable.

## 2020-02-01 NOTE — THERAPY EVALUATION
Acute Care - Occupational Therapy Initial Evaluation  [facility]     Patient Name: Yee Goodwin  : 1970  MRN: [id number]  Today's Date: 2020  Onset of Illness/Injury or Date of Surgery: 20  Date of Referral to OT: 20  Referring Physician: Dr. Cole    Admit Date: 2020       ICD-10-CM ICD-9-CM   1. Acute intracerebral hemorrhage (CMS/HCC) I61.9 431   2. L frontal ICH  I62.9 432.9   3. Intracranial hemorrhage (CMS/HCC) I62.9 432.9   4. Dysphagia, unspecified type R13.10 787.20   5. Aphasia R47.01 784.3   6. Apraxia R48.2 784.69   7. Impaired mobility and ADLs Z74.09 799.89     Patient Active Problem List   Diagnosis   • History of right hip replacement   • PUD (peptic ulcer disease)   • S/P madan   • Essential hypertension   • Gastritis   • JAYESH (obstructive sleep apnea)   • Epigastric pain   • Diarrhea   • Diverticulosis   • Hiatal hernia   • Tobacco abuse   • Acute L frontal ICH 20   • Hypertensive emergency   • Elevated transaminase levels   • Dyslipidemia   • Acute hypoxemic/hypercarbic respiratory failure due to ICH. Required Ventilatory support   • Acute intracerebral hemorrhage (CMS/HCC)   • S/P L frontal craniotomy and evacuation of intraparenchymal hematoma 20     Past Medical History:   Diagnosis Date   • Arthritis    • Diverticulitis    • GERD (gastroesophageal reflux disease)    • Hypertension      Past Surgical History:   Procedure Laterality Date   • CHOLECYSTECTOMY     • COLONOSCOPY     • CRANIOTOMY FOR SUBDURAL HEMATOMA Left 2020    Procedure: FRONTAL CRANIOTOMY FOR EVACUATION OF INTRAPARENCHYMAL HEMATOMA  LEFT;  Surgeon: Jose Cole MD;  Location: [facility];  Service: Neurosurgery   • HAND TENDON SURGERY Right     patient hand right thumb tendon surgery   • HIP SURGERY     • JOINT REPLACEMENT     • TUBAL ABDOMINAL LIGATION            OT ASSESSMENT FLOWSHEET (last 12 hours)      Occupational Therapy Evaluation     Row Name 20 0908                    OT Evaluation Time/Intention    Subjective Information  no complaints  -JR        Document Type  evaluation  -JR        Mode of Treatment  occupational therapy  -JR        Patient Effort  adequate  -JR        Symptoms Noted During/After Treatment  none  -JR           General Information    Patient Profile Reviewed?  yes  -JR        Onset of Illness/Injury or Date of Surgery  01/28/20  -JR        Referring Physician  Dr. Cole  -JR        Prior Level of Function  independent:;gait;transfer;bed mobility;ADL's;home management  -JR        Equipment Currently Used at Home  none  -JR        Pertinent History of Current Functional Problem  Pt admitted with intraparenchymal hemorrhage  -JR        Existing Precautions/Restrictions  fall;oxygen therapy device and L/min R sided weakness, NG  -JR        Risks Reviewed  patient and family:;increased discomfort  -JR        Benefits Reviewed  patient and family:;improve function;increase independence  -JR        Barriers to Rehab  medically complex  -JR           Relationship/Environment    Primary Source of Support/Comfort  significant other  -JR        Lives With  significant other  -JR           Resource/Environmental Concerns    Current Living Arrangements  home/apartment/condo  -JR           Home Main Entrance    Number of Stairs, Main Entrance  two  -JR           Cognitive Assessment/Interventions    Additional Documentation  Cognitive Assessment/Intervention (Group)  -JR           Cognitive Assessment/Intervention- PT/OT    Affect/Mental Status (Cognitive)  low arousal/lethargic  -JR        Orientation Status (Cognition)  disoriented to;person;place;situation;time  -JR        Follows Commands (Cognition)  follows one step commands;25-49% accuracy;verbal cues/prompting required  -JR        Safety Deficit (Cognitive)  severe deficit;awareness of need for assistance;insight into deficits/self awareness  -JR           Bed Mobility Assessment/Treatment     Comment (Bed Mobility)  Defer-up in chair via lift'  -           General ROM    GENERAL ROM COMMENTS  B UE ROM WFL  -           MMT (Manual Muscle Testing)    General MMT Comments  R UE 0/5, L UE functionally 4/5  -           Motor Assessment/Interventions    Additional Documentation  Therapeutic Exercise (Group);Therapeutic Exercise Interventions (Group)  -           Therapeutic Exercise    Upper Extremity Range of Motion (Therapeutic Exercise)  shoulder flexion/extension, bilateral;shoulder abduction/adduction, bilateral;elbow flexion/extension, bilateral;forearm supination/pronation, bilateral B digit flexion/extension  -        Exercise Type (Therapeutic Exercise)  PROM (passive range of motion)  -        Position (Therapeutic Exercise)  seated  -        Sets/Reps (Therapeutic Exercise)  1/10  -           Sensory Assessment/Intervention    Sensory General Assessment  -- Unable to assess this date  -        Additional Documentation  Vision Assessment/Intervention (Group)  -           Vision Assessment/Intervention    Visual Impairment/Limitations  -- Pt keeping head and eyes to the L, able to track to midline  -JR           Positioning and Restraints    Pre-Treatment Position  sitting in chair/recliner  -        Post Treatment Position  chair  -JR        In Chair  notified nsg;reclined;call light within reach;encouraged to call for assist;exit alarm on;with family/caregiver;on mechanical lift sling;RUE elevated;waffle cushion  -           Wound 01/29/20 0915 Right anterior groin Puncture    Wound - Properties Group Date first assessed: 01/29/20  -SM Time first assessed: 0915  -SM Side: Right  -SM Orientation: anterior  -SM Location: groin  -SM Primary Wound Type: Puncture  -SM Additional Comments: Angioseal  -SM       Wound 01/30/20 1038 head Incision    Wound - Properties Group Date first assessed: 01/30/20  -TK Time first assessed: 1038  -TK Present on Hospital Admission: N  -TK  Location: head  -TK Primary Wound Type: Incision  -TK       Plan of Care Review    Plan of Care Reviewed With  patient;significant other  -JR           Clinical Impression (OT)    Date of Referral to OT  01/31/20  -JR        OT Diagnosis  Decreased independence with ADL's and mobility  -JR        Patient/Family Goals Statement (OT Eval)  Pt's SO would like to take pt home  -JR        Criteria for Skilled Therapeutic Interventions Met (OT Eval)  yes;treatment indicated  -JR        Rehab Potential (OT Eval)  good, to achieve stated therapy goals  -JR        Therapy Frequency (OT Eval)  daily  -JR        Care Plan Review (OT)  risks/benefits reviewed;patient/other agree to care plan  -JR        Anticipated Discharge Disposition (OT)  inpatient rehabilitation facility  -JR           Vital Signs    Pre Systolic BP Rehab  152  -JR        Pre Treatment Diastolic BP  88  -JR        Post Systolic BP Rehab  151  -JR        Post Treatment Diastolic BP  90  -JR        Pretreatment Heart Rate (beats/min)  101  -JR        Posttreatment Heart Rate (beats/min)  108  -JR        Pre SpO2 (%)  95  -JR        O2 Delivery Pre Treatment  supplemental O2  -JR        Post SpO2 (%)  95  -JR        O2 Delivery Post Treatment  supplemental O2  -JR        Pre Patient Position  Sitting  -JR        Intra Patient Position  Sitting  -JR        Post Patient Position  Sitting  -JR           Planned OT Interventions    Planned Therapy Interventions (OT Eval)  activity tolerance training;adaptive equipment training;BADL retraining;cognitive/visual perception retraining;functional balance retraining;occupation/activity based interventions;passive ROM/stretching;patient/caregiver education/training;ROM/therapeutic exercise;strengthening exercise;transfer/mobility retraining  -JR           OT Goals    Bed Mobility Goal Selection (OT)  bed mobility, OT goal 1  -JR        Strength Goal Selection (OT)  strength, OT goal 1  -JR        Balance Goal Selection  (OT)  balance, OT goal 1  -JR        Additional Documentation  Strength Goal Selection (OT) (Row);Grooming Goal Selection (OT) (Row);Balance Goal Selection (OT) (Row)  -JR           Bed Mobility Goal 1 (OT)    Activity/Assistive Device (Bed Mobility Goal 1, OT)  sit to supine/supine to sit  -JR        [place] Level/Cues Needed (Bed Mobility Goal 1, OT)  moderate assist (50-74% patient effort);2 person assist;verbal cues required  -JR        Time Frame (Bed Mobility Goal 1, OT)  long term goal (LTG);2 weeks  -JR        Progress/Outcomes (Bed Mobility Goal 1, OT)  goal ongoing  -JR           Strength Goal 1 (OT)    Strength Goal 1 (OT)  Pt to increase R UE strength by 1/2 muscle grade to support ADL independence.  -JR        Time Frame (Strength Goal 1, OT)  long term goal (LTG);2 weeks  -JR        Progress/Outcome (Strength Goal 1, OT)  goal ongoing  -JR           Balance Goal 1 (OT)    Activity/Assistive Device (Balance Goal 1, OT)  sitting, static  -JR        [place] Level/Cues Needed (Balance Goal 1, OT)  moderate assist (50-74% patient effort);verbal cues required  -JR        Time Frame (Balance Goal 1, OT)  long term goal (LTG);2 weeks  -JR        Progress/Outcomes (Balance Goal 1, OT)  goal ongoing  -JR           OT Cognitive Goals    Orientation Goal Selection (OT)  orientation, OT goal 1  -JR        Directions Goal Selection (OT)  direction following, OT goal 1  -JR        Additional Documentation  Directions Goal Selection (OT) (Row)  -JR           Orientation Goal 1 (OT)    Activity (Orientation Goal 1, OT)  oriented to person;oriented to place  -JR        [place]/Cues/Accuracy (Orientation Goal 1, OT)  with 50% accuracy;minimal cues for use of strategies  -JR        Time Frame (Orientation Goal 1, OT)  long term goal (LTG);2 weeks  -JR        Progress/Outcome (Orientation Goal 1, OT)  goal ongoing  -JR           Direction Following Goal 1 (OT)    Activity (Direction Following Goal 1, OT)   follow one step directions  -JR        [place]/Cues/Accuracy (Direction Following Goal 1, OT)  with 75% accuracy;verbal cues/redirection  -JR        Time Frame (Direction Following Goal 1, OT)  long term goal (LTG);2 weeks  -JR        Progress/Outcome (Direction Following Goal 1, OT)  goal ongoing  -JR           Living Environment    Home Accessibility  stairs to enter home;stairs within home  -JR          User Key  (r) = Recorded By, (t) = Taken By, (c) = Cosigned By    Initials Name Effective Dates    JR Lalita, Sarah VILLANUEVA, OT 06/22/15 -     Rachel Brito RN 06/16/16 -     Dayana Dodge RN 11/04/16 -                OT Recommendation and Plan  Outcome Summary/Treatment Plan (OT)  Anticipated Discharge Disposition (OT): inpatient rehabilitation facility  Planned Therapy Interventions (OT Eval): activity tolerance training, adaptive equipment training, BADL retraining, cognitive/visual perception retraining, functional balance retraining, occupation/activity based interventions, passive ROM/stretching, patient/caregiver education/training, ROM/therapeutic exercise, strengthening exercise, transfer/mobility retraining  Therapy Frequency (OT Eval): daily  Plan of Care Review  Plan of Care Reviewed With: patient, significant other  Plan of Care Reviewed With: patient, significant other    Outcome Measures     Row Name 02/01/20 0908             How much help from another is currently needed...    Putting on and taking off regular lower body clothing?  1  -JR      Bathing (including washing, rinsing, and drying)  1  -JR      Toileting (which includes using toilet bed pan or urinal)  1  -JR      Putting on and taking off regular upper body clothing  1  -JR      Taking care of personal grooming (such as brushing teeth)  1  -JR      Eating meals  1  -JR      AM-PAC 6 Clicks Score (OT)  6  -JR         Modified Rachell Scale    Modified Heard Scale  5 - Severe disability.  Bedridden, incontinent, and requiring  constant nursing care and attention.  -         Functional Assessment    Outcome Measure Options  AM-PAC 6 Clicks Daily Activity (OT);Modified [place]  -        User Key  (r) = Recorded By, (t) = Taken By, (c) = Cosigned By    Initials Name Provider Type    Sarah Santamaria OT Occupational Therapist          Time Calculation:   Time Calculation- OT     Row Name 02/01/20 0908             Time Calculation- OT    OT Start Time  0908  -      OT Received On  02/01/20  -      OT Goal Re-Cert Due Date  02/11/20  -        User Key  (r) = Recorded By, (t) = Taken By, (c) = Cosigned By    Initials Name Provider Type    Sarah Santamaria OT Occupational Therapist        Therapy Charges for Today     Code Description Service Date Service Provider Modifiers Qty    42869705720 HC OT EVAL LOW COMPLEXITY 4 2/1/2020 Sarah Celis OT GO 1               Sarah Celis OT  2/1/2020

## 2020-02-01 NOTE — PLAN OF CARE
Problem: Patient Care Overview  Goal: Plan of Care Review  Outcome: Ongoing (interventions implemented as appropriate)  Flowsheets (Taken 2/1/2020 9003)  Plan of Care Reviewed With: patient  Outcome Summary: VSS. NIH SS was 26 last night. Pt still unable to move RUE & RLE. Pt remains stable neurologically. UOP adequate. Attempted to replace keofeed, but unable to advance past the stomach. Tube feedings on hold. Will continue to monitor.

## 2020-02-01 NOTE — PROGRESS NOTES
Intensivist Note     2/1/2020  Hospital Day: 4  2 Days Post-Op  ICU Stays Timeline      Dates and times are displayed in the time zone of the admission          Hospital Admission: 01/28/20 1707 - Current  ICU stays: 1      In Date/Time Event Department ICU Stay Duration     01/28/20 1707 Admission [facility] EMERGENCY DEPT      01/28/20 2114 Transfer In  [facility] 2B ICU 3 days 21 hours 21 minutes     01/29/20 0813 Transfer In [facility] CATH LAB      01/29/20 0916 Transfer In [facility] 2B ICU      01/30/20 0954 Transfer In [facility] OR      01/30/20 1123 Transfer In [facility] 2B ICU                 Ms. Yee Goodwin, 49 y.o. female is followed for:    Acute L frontal ICH 1/28/20    S/P L frontal craniotomy and evacuation of intraparenchymal hematoma 1/30/20    Hypertensive emergency    Acute hypoxemic/hypercarbic respiratory failure due to ICH. Required Ventilatory support    JAYESH (obstructive sleep apnea)    Tobacco abuse    Elevated transaminase levels    Dyslipidemia       SUBJECTIVE     49-year-old white female admitted 1/28/2020 for acute left frontal ICH in association with hypertensive emergency.  NIH was 25 and volume of hemorrhage was 43 cc with surrounding edema and associated subarachnoid hemorrhage creating mass-effect on the left lateral ventricle with rightward shift of 7 mm.  CT CHERY revealed atherosclerosis of bilateral carotid bifurcations with 80% right and 75% left luminal narrowing as well as moderate to severe stenoses and irregularities throughout the MCA distribution bilaterally.  No vascular malformations/aneurysms were noted. Required intubation and ventilatory support and blood pressure control, then underwent left frontal craniotomy and evacuation of intraparenchymal hematoma 1/30/2020.  Postop CT scan showed significant improvement with less mass-effect.  Patient began to wake up and follow commands on the left, although as expected remained hemiparetic on the right.  Gas exchange and respiratory mechanics  "were adequate so was extubated 1/31/2020 (yesterday).      Interval history: Has done well from the standpoint of her gas exchange and work of breathing.  O2 sats are 96% on 3 L nasal oxygen and work of breathing normal.  Is sitting up in a chair today and follows commands with her left arm and leg.  Still with severe expressive aphasia and right hemiparesis.  Underwent speech evaluation today for dysphagia and failed so will need enteral feeds.  Has a nasoduodenal tube in place but it is kinked and will not flush and has failed more than one attempt to replace it so will place an NG tube.  She remains with blood pressure of 155/85 and still requires Cardene drip because of no oral or NG access.  Enteral feeds are to be started once adequate NG tube is placed.    ROS: Per subjective, all other systems reviewed and were negative.    The patient's relevant PMH, PSH, FH, and SH were reviewed and updated in Epic as appropriate. Allergies and Medications reviewed.    OBJECTIVE     /92   Pulse 86   Temp 100.2 °F (37.9 °C) (Bladder)   Resp 18   Ht 167.6 cm (66\")   Wt 67.5 kg (148 lb 13 oz)   LMP  (LMP Unknown)   SpO2 96%   BMI 24.02 kg/m²   Flow (L/min): 3    Flowsheet Rows      First Filed Value   Admission Height  167.6 cm (66\") Documented at 01/28/2020 1754   Admission Weight  72.6 kg (160 lb) Documented at 01/28/2020 1754        Intake & Output (last day)       01/31 0701 - 02/01 0700 02/01 0701 - 02/02 0700    I.V. (mL/kg) 2387 (35.4) 1098.1 (16.3)    Other 71 20    NG/ 44    IV Piggyback 241 60.2    Total Intake(mL/kg) 2882 (42.7) 1222.3 (18.1)    Urine (mL/kg/hr) 4020 (2.5) 1325 (1.7)    Total Output 4020 1325    Net -1138 -102.7                Exam:  General Exam:  Well-developed middle-aged white female sitting up in a chair with obvious right facial weakness and left gaze preference.  HEENT: Pupils equal and reactive.  ND tube in place but not functional  Neck:                          Supple, " no JVD, thyromegaly, or adenopathy  Lungs: Clear anteriorly and laterally  Cardiovascular: Regular rate and rhythm without murmurs or gallops.   bpm  Abdomen: Soft nontender without organomegaly or masses.  Obese   and rectal: Deferred.  Extremities: No cyanosis clubbing edema.  Neurologic:                 Right hemiparesis.  Expressive aphasia.  Left gaze preference.  Follows commands with left arm and leg.    Chest X-Ray: No film today.  Yesterday's film revealed some minimal left base atelectasis.    INFUSIONS    niCARdipine 5-15 mg/hr Last Rate: 5 mg/hr (02/01/20 1512)   sodium chloride 75 mL/hr Last Rate: 75 mL/hr (02/01/20 1013)       Results from last 7 days   Lab Units 02/01/20 0354 01/31/20 0444 01/30/20  0439   WBC 10*3/mm3 17.15* 13.83* 12.66*   HEMOGLOBIN g/dL 12.3 11.3* 11.9*   HEMATOCRIT % 38.5 34.3 36.2   PLATELETS 10*3/mm3 372 267 260     Results from last 7 days   Lab Units 02/01/20 0354 02/01/20  0001 01/31/20  0444   SODIUM mmol/L 131*  --  142   POTASSIUM mmol/L 3.4* 3.5 3.6   CHLORIDE mmol/L 93*  --  103   CO2 mmol/L 24.0  --  26.0   BUN mg/dL 10  --  15   CREATININE mg/dL 0.35*  --  0.43*   GLUCOSE mg/dL 113*  --  189*   CALCIUM mg/dL 9.2  --  8.6     Results from last 7 days   Lab Units 02/01/20 0354 02/01/20 0001 01/31/20 0444 01/30/20  0439   MAGNESIUM mg/dL 2.2  --  2.9*  --  2.4   PHOSPHORUS mg/dL 2.4* 2.2* 1.5*   < > 2.2*    < > = values in this interval not displayed.     Results from last 7 days   Lab Units 02/01/20 0354 01/31/20 0444 01/29/20  0644   ALK PHOS U/L 120* 98 108   BILIRUBIN mg/dL 0.6 0.4 0.5   ALT (SGPT) U/L 174* 153* 152*   AST (SGOT) U/L 74* 92* 126*       No results found for: SEDRATE  No results found for: BNP  Lab Results   Component Value Date    TROPONINI <0.01 06/27/2015     Lab Results   Component Value Date    TSH 2.700 12/05/2018     No results found for: LACTATE  No results found for: CORTISOL    Results from last 7 days   Lab Units  01/31/20  0957 01/31/20  0446 01/30/20  0338 01/29/20  0343 01/28/20  1858   PH, ARTERIAL pH units 7.506* 7.465* 7.476* 7.436 7.371   PCO2, ARTERIAL mm Hg 36.3 40.8 37.2 39.7 49.6*   PO2 ART mm Hg 63.1* 67.4* 88.8 83.7 96.6   HCO3 ART mmol/L 28.6* 29.4* 27.5* 26.7* 28.7*   FIO2 % 35 30 40 40 60         I reviewed the patient's results, images and medication.    Assessment/Plan   ASSESSMENT        Acute L frontal ICH 1/28/20    S/P L frontal craniotomy and evacuation of intraparenchymal hematoma 1/30/20    Hypertensive emergency    Acute hypoxemic/hypercarbic respiratory failure due to ICH. Required Ventilatory support    JAYESH (obstructive sleep apnea)    Tobacco abuse    Elevated transaminase levels    Dyslipidemia      DISCUSSION: Appears to be doing reasonably well from the standpoint of her intracranial hemorrhage and craniotomy with hematoma extraction.  Blood pressure still a problem but just because she has not been getting her oral ACE inhibitor or her home diuretic due to NG tube problems.  Once we get her back on her ACE inhibitor's and possibly additional therapy she should be able to come off the Cardene.  Gas exchange has improved but she still requiring 3 L most likely due to atelectasis and possibly some degree of diastolic dysfunction due to her hypertension.  She does not appear to be having obvious periods of obstructive apnea despite her history of JAYESH.  Would prefer to avoid CPAP for now because of the pressure mask required (which would be quite uncomfortable for her).    PLAN     1.  Mobilization and incentive spirometry  2.  Wean nasal oxygen as able  3.  Follow-up chest x-ray in a.m. to relook at mild basilar atelectasis  4.  Continue ulcer prophylaxis  5.  Continue SCDs for DVT prophylaxis  6.  Should be able to stop Accu-Cheks  7.  DC IV fluids since enteral feeds will be at goal soon  8.  Begin home lisinopril and hydrochlorothiazide then wean off Cardene drip     Plan of care and goals  reviewed with mulitdisciplinary team at daily rounds.    I discussed the patient's findings and my recommendations with patient, family, nursing staff and consulting provider    High level of risk due to: severe exacerbation of chronic illness, illness with threat to life or bodily function and parenteral controlled substances.    Time spent Critical care 25 min (It does not include procedure time).    Mauricio Giron MD  Intensive Care Medicine  02/01/20 6:35 PM

## 2020-02-01 NOTE — PROGRESS NOTES
Subjective: Hospital day 4 postoperative day 2 status post left frontal craniotomy for evacuation of intraparenchymal hematoma.  Extubated yesterday.    Objective:    Vitals:    20 0615   BP: 166/95   Pulse: 105   Resp:    Temp:    SpO2: 96%     Pulse  Av.4  Min: 72  Max: 135  Systolic (24hrs), Av , Min:125 , Max:171     Diastolic (24hrs), Av, Min:68, Max:97    Temp (24hrs), Av.8 °F (37.7 °C), Min:99 °F (37.2 °C), Max:100.4 °F (38 °C)      She is sitting in the bedside chair.  She follows commands with her left upper and left lower extremity.  She still has a flaccid right hemiplegia.  She has profound expressive aphasia.    Lab Results   Component Value Date     (L) 2020       A/P:   I anticipate that she will need a PEG tube as well as rehab  When medically clear, she is safe to transfer out of the intensive care unit from my standpoint.  We will continue to follow

## 2020-02-01 NOTE — THERAPY TREATMENT NOTE
Acute Care - Speech Language Pathology Treatment Note  [facility]     Patient Name: Yee Goodwin  : 1970  MRN: [id number]  Today's Date: 2020         Admit Date: 2020    Visit Dx:      ICD-10-CM ICD-9-CM   1. Acute intracerebral hemorrhage (CMS/HCC) I61.9 431   2. L frontal ICH  I62.9 432.9   3. Intracranial hemorrhage (CMS/HCC) I62.9 432.9   4. Dysphagia, unspecified type R13.10 787.20   5. Aphasia R47.01 784.3   6. Apraxia R48.2 784.69   7. Impaired mobility and ADLs Z74.09 799.89     Patient Active Problem List   Diagnosis   • History of right hip replacement   • PUD (peptic ulcer disease)   • S/P madan   • Essential hypertension   • Gastritis   • JAYESH (obstructive sleep apnea)   • Epigastric pain   • Diarrhea   • Diverticulosis   • Hiatal hernia   • Tobacco abuse   • Acute L frontal ICH 20   • Hypertensive emergency   • Elevated transaminase levels   • Dyslipidemia   • Acute hypoxemic/hypercarbic respiratory failure due to ICH. Required Ventilatory support   • Acute intracerebral hemorrhage (CMS/HCC)   • S/P L frontal craniotomy and evacuation of intraparenchymal hematoma 20        Therapy Treatment  Rehabilitation Treatment Summary     Row Name 20 1100             Treatment Time/Intention    Discipline  speech language pathologist  -ML      Document Type  re-evaluation;therapy note (daily note)  -ML      Subjective Information  no complaints nonverbal-lethargic  -ML      Mode of Treatment  individual therapy;speech-language pathology  -ML      Patient/Family Observations  Significant other present  -ML      Care Plan Review  care plan/treatment goals reviewed  -ML      Therapy Frequency (Swallow)  5 days per week  -ML      Therapy Frequency (SLP SLC)  5 days per week  -ML      Patient Effort  adequate  -ML      Recorded by [ML] Jocelyne Okeefe CCC-SLP 20 1130      Row Name 20 1100             Cognitive Assessment Intervention- SLP    Cognitive Function  (Cognition)  unable/difficult to assess  -ML      Orientation Status (Cognition)  unable/difficult to assess  -ML      Attention (Cognitive)  sustained;moderate impairment  -ML      Cognition, Comment  Waxing/waning persists  -ML      Recorded by [ML] Jocelyne Okeefe CCC-SLP 02/01/20 1130      Row Name 02/01/20 1100             Pain Scale: FACES Pre/Post-Treatment    Pain: FACES Scale, Pretreatment  0-->no hurt  -ML      Recorded by [ML] Jocelyne Okeefe CCC-SLP 02/01/20 1131      Row Name                Wound 01/29/20 0915 Right anterior groin Puncture    Wound - Properties Group Date first assessed: 01/29/20 [SM] Time first assessed: 0915 [SM] Side: Right [SM] Orientation: anterior [SM] Location: groin [SM] Primary Wound Type: Puncture [SM] Additional Comments: Angioseal [SM] Recorded by:  [SM] Dayana Avila RN 01/29/20 1259    Row Name                Wound 01/30/20 1038 head Incision    Wound - Properties Group Date first assessed: 01/30/20 [TK] Time first assessed: 1038 [TK] Present on Hospital Admission: N [TK] Location: head [TK] Primary Wound Type: Incision [TK] Recorded by:  [TK] Rachel Jaime RN 01/30/20 1038    Row Name 02/01/20 1100             Outcome Summary/Treatment Plan (SLP)    Daily Summary of Progress (SLP)  progress toward functional goals is gradual  -ML      Barriers to Overall Progress (SLP)  Alertness level  -ML      Plan for Continued Treatment (SLP)  Ongoing dysphagia and communication tx  -ML      Anticipated Dischage Disposition  inpatient rehabilitation facility  -ML      Recorded by [ML] Jocelyne Okeefe CCC-SLP 02/01/20 1131        User Key  (r) = Recorded By, (t) = Taken By, (c) = Cosigned By    Initials Name Effective Dates Discipline    TK Rachel Jaime RN 06/16/16 -  Nurse    Dayana oDdge RN 11/04/16 -  Nurse    Jocelyne Michael CCC-SLP 04/03/18 -  SLP          EDUCATION  The patient has been educated in the following areas:   Dysphagia (Swallowing  Impairment).    SLP Recommendation and Plan  Daily Summary of Progress (SLP): progress toward functional goals is gradual  Barriers to Overall Progress (SLP): Alertness level  Plan for Continued Treatment (SLP): Ongoing dysphagia and communication tx  Anticipated Dischage Disposition: inpatient rehabilitation facility             SLP GOALS     Row Name 02/01/20 1100 01/31/20 1415          Oral Nutrition/Hydration Goal 1 (SLP)    Oral Nutrition/Hydration Goal 1, SLP  LTG: Improve swallow function until can participate in instrumental swallow study without cues  -ML  LTG: Improve swallow function until can participate in instrumental swallow study without cues  -SM     Time Frame (Oral Nutrition/Hydration Goal 1, SLP)  by discharge  -ML  by discharge  -SM     Barriers (Oral Nutrition/Hydration Goal 1, SLP)  Waxing/waning  -ML  --     Progress/Outcomes (Oral Nutrition/Hydration Goal 1, SLP)  continuing progress toward goal  -ML  --        Oral Nutrition/Hydration Goal 2 (SLP)    Oral Nutrition/Hydration Goal 2, SLP  Tolerate SLP PO trials without s/s aspiration with 50% accuracy and cues   -ML  Tolerate SLP PO trials without s/s aspiration with 50% accuracy and cues   -SM     Time Frame (Oral Nutrition/Hydration Goal 2, SLP)  short term goal (STG)  -ML  short term goal (STG)  -SM     Barriers (Oral Nutrition/Hydration Goal 2, SLP)  Accepts 1/2 ice chip-minimal to no attempt to manipulation, and no swallow reflex  -ML  --     Progress/Outcomes (Oral Nutrition/Hydration Goal 2, SLP)  continuing progress toward goal  -ML  --        Labial Strengthening Goal 1 (SLP)    Activity (Labial Strengthening Goal 1, SLP)  increase labial tone  -ML  increase labial tone  -SM     Increase Labial Tone  labial resistance exercises  -ML  labial resistance exercises  -SM     [place]/Accuracy (Labial Strengthening Goal 1, SLP)  with minimal cues (75-90% accuracy)  -ML  with minimal cues (75-90% accuracy)  -SM     Time Frame (Labial  Strengthening Goal 1, SLP)  short term goal (STG)  -ML  short term goal (STG)  -SM     Barriers (Labial Strengthening Goal 1, SLP)  Too fatigued for ex but no anterior loss and lips closed- no drooling  -ML  --     Progress/Outcomes (Labial Strengthening Goal 1, SLP)  continuing progress toward goal  -ML  --        Lingual Strengthening Goal 1 (SLP)    Activity (Lingual Strengthening Goal 1, SLP)  increase lingual tone/sensation/control/coordination/movement  -ML  increase lingual tone/sensation/control/coordination/movement  -SM     Increase Lingual Tone/Sensation/Control/Coordination/Movement  lingual movement exercises;lingual resistance exercises  -ML  lingual movement exercises;lingual resistance exercises  -SM     [place]/Accuracy (Lingual Strengthening Goal 1, SLP)  with minimal cues (75-90% accuracy)  -ML  with minimal cues (75-90% accuracy)  -SM     Time Frame (Lingual Strengthening Goal 1, SLP)  short term goal (STG)  -ML  short term goal (STG)  -SM     Barriers (Lingual Strengthening Goal 1, SLP)  Unable to complete ex- no movement of ice chip  -ML  --     Progress/Outcomes (Lingual Strengthening Goal 1, SLP)  progress slower than expected  -ML  --        Pharyngeal Strengthening Exercise Goal 1 (SLP)    Activity (Pharyngeal Strengthening Goal 1, SLP)  increase pharyngeal sensation;increase timing;increase anterior movement of the hyolaryngeal complex;increase closure at entrance to airway/closure of airway at glottis;increase squeeze/positive pressure generation  -ML  increase pharyngeal sensation;increase timing;increase anterior movement of the hyolaryngeal complex;increase closure at entrance to airway/closure of airway at glottis;increase squeeze/positive pressure generation  -SM     Increase Pharyngeal Sensation  gustatory stimulation (sour/cold)  -ML  gustatory stimulation (sour/cold)  -SM     Increase Timing  prepping - 3 second prep or suck swallow or 3-step swallow  -ML  prepping - 3 second  prep or suck swallow or 3-step swallow  -SM     Increase Anterior Movement of the Hyolaryngeal Complex  chin tuck against resistance (CTAR)  -ML  chin tuck against resistance (CTAR)  -SM     Increase Closure at Entrance to Airway/Closure of Airway at Glottis  breath hold exercises  -ML  breath hold exercises volitional cough  -SM     Increase Squeeze/Positive Pressure Generation  hard effortful swallow  -ML  hard effortful swallow  -SM     [place]/Accuracy (Pharyngeal Strengthening Goal 1, SLP)  with minimal cues (75-90% accuracy)  -ML  with minimal cues (75-90% accuracy)  -SM     Time Frame (Pharyngeal Strengthening Goal 1, SLP)  short term goal (STG)  -ML  short term goal (STG)  -SM     Barriers (Pharyngeal Strengthening Goal 1, SLP)  No swallow achieved on command or with ice chips- tongue pumping 1x  -ML  --     Progress/Outcomes (Pharyngeal Strengthening Goal 1, SLP)  progress slower than expected  -ML  --        Comprehend Questions Goal 1 (SLP)    Improve Ability to Comprehend Questions Goal 1 (SLP)  simple yes/no questions;100%;with minimal cues (75-90%)  -ML  simple yes/no questions;100%;with minimal cues (75-90%)  -SM     Time Frame (Comprehend Questions Goal 1, SLP)  short term goal (STG)  -ML  short term goal (STG)  -SM     Progress/Outcomes (Comprehend Questions Goal 1, SLP)  continuing progress toward goal  -ML  --     Comment (Comprehend Questions Goal 1, SLP)  Thumbs up for yes- did not achieve no gesture-60%  -ML  --        Follow Directions Goal 2 (SLP)    Improve Ability to Follow Directions Goal 1 (SLP)  1 step direction without objects;60%;with moderate cues (50-74%)  -ML  --     Time Frame (Follow Directions Goal 1, SLP)  short term goal (STG);by discharge  -ML  --     Progress (Ability to Follow Directions Goal 1, SLP)  40%;with moderate cues (50-74%)  -ML  --     Progress/Outcomes (Follow Directions Goal 1, SLP)  continuing progress toward goal  -ML  --     Comment (Follow Directions  Goal 1, SLP)  Follows some one step and/or complies with model  -ML  --        Word Retrieval Skills Goal 1 (SLP)    Improve Word Retrieval Skills By Goal 1 (SLP)  completing automatic speech task, counting;repeating sounds;completing open ended structured sentence;50%;with moderate cues (50-74%)  -ML  completing automatic speech task, counting;repeating sounds;completing open ended structured sentence;50%;with moderate cues (50-74%)  -SM     Time Frame (Word Retrieval Goal 1, SLP)  short term goal (STG)  -ML  short term goal (STG)  -SM     Progress (Word Retrieval Skills Goal 1, SLP)  0% No speech  -ML  --     Progress/Outcomes (Word Retrieval Goal 1, SLP)  progress slower than expected  -ML  --     Comment (Word Retrieval Goal 1, SLP)  No speech- no speech attempt  -ML  --        Motor Planning Goal 1 (SLP)    Improve Motor Planning to Reduce Apraxia by Goal 1 (SLP)  imitating mouth postures;imitating vowels;following isolated oral commands;50%;with moderate cues (50-74%)  -ML  imitating mouth postures;imitating vowels;following isolated oral commands;50%;with moderate cues (50-74%)  -SM     Time Frame (Motor Planning Goal 1, SLP)  short term goal (STG)  -ML  short term goal (STG)  -SM     Progress (Motor Planning Goal 1, SLP)  0%;with maximum cues (25-49%)  -ML  --     Progress/Outcomes (Motor Planning Goal 1, SLP)  progress slower than expected  -ML  --     Comment (Motor Planning Goal 1, SLP)  Unable to imitate oral movement  -ML  --        Augmentative/Alternative Communication Objectives Goal 1 (SLP    Communication (Augmentative/Alternative Communication Goal 1, SLP)  improve ability to use non-verbal communication strategies  -ML  improve ability to use non-verbal communication strategies  -SM     Improve Communication by (Augmentative/Alternative Communication Goal 1, SLP)  use gestures to communicate;alphabet/picture board;60%;with minimal cues (75-90%)  -ML  use gestures to communicate;alphabet/picture  board;60%;with minimal cues (75-90%)  -SM     Time Frame (Augmentative/Alternative Communication Goal 1, SLP)  short term goal (STG)  -ML  short term goal (STG)  -SM     Comment (Augmentative/Alternative Communication Goal 1, SLP)  Not addressed today beyond y/n gesture  -ML  --        Additional Goal 1 (SLP)    Additional Goal 1, SLP  --  LTG: Improve communication in order to participate in care while in hospital setting with 80% accuracy and cues  -SM     Time Frame (Additional Goal 1, SLP)  --  by discharge  -SM       User Key  (r) = Recorded By, (t) = Taken By, (c) = Cosigned By    Initials Name Provider Type    Caren Arias, MS CCC-SLP Speech and Language Pathologist    Jocelyne Michael CCC-SLP Speech and Language Pathologist              Time Calculation:     Time Calculation- SLP     Row Name 20 1140             Time Calculation- SLP    SLP Start Time  1100  -ML      SLP Received On  20  -ML        User Key  (r) = Recorded By, (t) = Taken By, (c) = Cosigned By    Initials Name Provider Type    Jocelyne Michael CCC-SLP Speech and Language Pathologist          Therapy Charges for Today     Code Description Service Date Service Provider Modifiers Qty    48798587868 HC ST TREATMENT SWALLOW 2 2020 Jocelyne Okeefe CCC-SLP GN 1    [id number] HC ST TREATMENT SPEECH 1 2020 Jocelyne Okeefe CCC-TIKI GN 1                     ANSELMO [place]  2020   and Acute Care - Speech Language Pathology   Swallow Treatment Note [facility]     Patient Name: Yee Goodwin  : 1970  MRN: [id number]  Today's Date: 2020  Onset of Illness/Injury or Date of Surgery: 20     Referring Physician: Dr. Cole      Admit Date: 2020    Visit Dx:      ICD-10-CM ICD-9-CM   1. Acute intracerebral hemorrhage (CMS/HCC) I61.9 431   2. L frontal ICH  I62.9 432.9   3. Intracranial hemorrhage (CMS/HCC) I62.9 432.9   4. Dysphagia, unspecified type R13.10  787.20   5. Aphasia R47.01 784.3   6. Apraxia R48.2 784.69   7. Impaired mobility and ADLs Z74.09 799.89     Patient Active Problem List   Diagnosis   • History of right hip replacement   • PUD (peptic ulcer disease)   • S/P madan   • Essential hypertension   • Gastritis   • JAYESH (obstructive sleep apnea)   • Epigastric pain   • Diarrhea   • Diverticulosis   • Hiatal hernia   • Tobacco abuse   • Acute L frontal ICH 1/28/20   • Hypertensive emergency   • Elevated transaminase levels   • Dyslipidemia   • Acute hypoxemic/hypercarbic respiratory failure due to ICH. Required Ventilatory support   • Acute intracerebral hemorrhage (CMS/HCC)   • S/P L frontal craniotomy and evacuation of intraparenchymal hematoma 1/30/20       Therapy Treatment  Rehabilitation Treatment Summary     Row Name 02/01/20 1100             Treatment Time/Intention    Discipline  speech language pathologist  -ML      Document Type  re-evaluation;therapy note (daily note)  -ML      Subjective Information  no complaints nonverbal-lethargic  -ML      Mode of Treatment  individual therapy;speech-language pathology  -ML      Patient/Family Observations  Significant other present  -ML      Care Plan Review  care plan/treatment goals reviewed  -ML      Therapy Frequency (Swallow)  5 days per week  -ML      Therapy Frequency (SLP SLC)  5 days per week  -ML      Patient Effort  adequate  -ML      Recorded by [ML] Jocelyne Okeefe CCC-SLP 02/01/20 1130      Row Name 02/01/20 1100             Cognitive Assessment Intervention- SLP    Cognitive Function (Cognition)  unable/difficult to assess  -ML      Orientation Status (Cognition)  unable/difficult to assess  -ML      Attention (Cognitive)  sustained;moderate impairment  -ML      Cognition, Comment  Waxing/waning persists  -ML      Recorded by [ML] Jocelyne Okeefe CCC-SLP 02/01/20 1130      Row Name 02/01/20 1100             Pain Scale: FACES Pre/Post-Treatment    Pain: FACES Scale, Pretreatment   0-->no hurt  -ML      Recorded by [ML] Jocelyne Okeefe CCC-SLP 02/01/20 1131      Row Name                Wound 01/29/20 0915 Right anterior groin Puncture    Wound - Properties Group Date first assessed: 01/29/20 [SM] Time first assessed: 0915 [SM] Side: Right [SM] Orientation: anterior [SM] Location: groin [SM] Primary Wound Type: Puncture [SM] Additional Comments: Angioseal [SM] Recorded by:  [SM] Dayana Avila RN 01/29/20 1259    Row Name                Wound 01/30/20 1038 head Incision    Wound - Properties Group Date first assessed: 01/30/20 [TK] Time first assessed: 1038 [TK] Present on Hospital Admission: N [TK] Location: head [TK] Primary Wound Type: Incision [TK] Recorded by:  [TK] Rachel Jaime RN 01/30/20 1038    Row Name 02/01/20 1100             Outcome Summary/Treatment Plan (SLP)    Daily Summary of Progress (SLP)  progress toward functional goals is gradual  -ML      Barriers to Overall Progress (SLP)  Alertness level  -ML      Plan for Continued Treatment (SLP)  Ongoing dysphagia and communication tx  -ML      Anticipated Dischage Disposition  inpatient rehabilitation facility  -ML      Recorded by [ML] Jocelyne Okeefe CCC-SLP 02/01/20 1131        User Key  (r) = Recorded By, (t) = Taken By, (c) = Cosigned By    Initials Name Effective Dates Discipline    TK Rachel Jaime RN 06/16/16 -  Nurse    Dayana Dodge RN 11/04/16 -  Nurse    Jocelyne Michael CCC-SLP 04/03/18 -  SLP          Outcome Summary  Outcome Summary/Treatment Plan (SLP)  Daily Summary of Progress (SLP): progress toward functional goals is gradual (02/01/20 1100 : Jocelyne Okeefe CCC-SLP)  Barriers to Overall Progress (SLP): Alertness level (02/01/20 1100 : Jocelyne Okeefe, CCC-SLP)  Plan for Continued Treatment (SLP): Ongoing dysphagia and communication tx (02/01/20 1100 : Jocelyne Okeefe, CCC-SLP)  Anticipated Dischage Disposition: inpatient rehabilitation facility (02/01/20 1100 :  Jocelyne Okeefe, [facility]-SLP)      SLP GOALS     Row Name 02/01/20 1100 01/31/20 1415          Oral Nutrition/Hydration Goal 1 (SLP)    Oral Nutrition/Hydration Goal 1, SLP  LTG: Improve swallow function until can participate in instrumental swallow study without cues  -ML  LTG: Improve swallow function until can participate in instrumental swallow study without cues  -SM     Time Frame (Oral Nutrition/Hydration Goal 1, SLP)  by discharge  -ML  by discharge  -SM     Barriers (Oral Nutrition/Hydration Goal 1, SLP)  Waxing/waning  -ML  --     Progress/Outcomes (Oral Nutrition/Hydration Goal 1, SLP)  continuing progress toward goal  -ML  --        Oral Nutrition/Hydration Goal 2 (SLP)    Oral Nutrition/Hydration Goal 2, SLP  Tolerate SLP PO trials without s/s aspiration with 50% accuracy and cues   -ML  Tolerate SLP PO trials without s/s aspiration with 50% accuracy and cues   -SM     Time Frame (Oral Nutrition/Hydration Goal 2, SLP)  short term goal (STG)  -ML  short term goal (STG)  -SM     Barriers (Oral Nutrition/Hydration Goal 2, SLP)  Accepts 1/2 ice chip-minimal to no attempt to manipulation, and no swallow reflex  -ML  --     Progress/Outcomes (Oral Nutrition/Hydration Goal 2, SLP)  continuing progress toward goal  -ML  --        Labial Strengthening Goal 1 (SLP)    Activity (Labial Strengthening Goal 1, SLP)  increase labial tone  -ML  increase labial tone  -SM     Increase Labial Tone  labial resistance exercises  -ML  labial resistance exercises  -SM     [place]/Accuracy (Labial Strengthening Goal 1, SLP)  with minimal cues (75-90% accuracy)  -ML  with minimal cues (75-90% accuracy)  -SM     Time Frame (Labial Strengthening Goal 1, SLP)  short term goal (STG)  -ML  short term goal (STG)  -SM     Barriers (Labial Strengthening Goal 1, SLP)  Too fatigued for ex but no anterior loss and lips closed- no drooling  -ML  --     Progress/Outcomes (Labial Strengthening Goal 1, SLP)  continuing progress toward  goal  -ML  --        Lingual Strengthening Goal 1 (SLP)    Activity (Lingual Strengthening Goal 1, SLP)  increase lingual tone/sensation/control/coordination/movement  -ML  increase lingual tone/sensation/control/coordination/movement  -SM     Increase Lingual Tone/Sensation/Control/Coordination/Movement  lingual movement exercises;lingual resistance exercises  -ML  lingual movement exercises;lingual resistance exercises  -SM     [place]/Accuracy (Lingual Strengthening Goal 1, SLP)  with minimal cues (75-90% accuracy)  -ML  with minimal cues (75-90% accuracy)  -SM     Time Frame (Lingual Strengthening Goal 1, SLP)  short term goal (STG)  -ML  short term goal (STG)  -SM     Barriers (Lingual Strengthening Goal 1, SLP)  Unable to complete ex- no movement of ice chip  -ML  --     Progress/Outcomes (Lingual Strengthening Goal 1, SLP)  progress slower than expected  -ML  --        Pharyngeal Strengthening Exercise Goal 1 (SLP)    Activity (Pharyngeal Strengthening Goal 1, SLP)  increase pharyngeal sensation;increase timing;increase anterior movement of the hyolaryngeal complex;increase closure at entrance to airway/closure of airway at glottis;increase squeeze/positive pressure generation  -ML  increase pharyngeal sensation;increase timing;increase anterior movement of the hyolaryngeal complex;increase closure at entrance to airway/closure of airway at glottis;increase squeeze/positive pressure generation  -SM     Increase Pharyngeal Sensation  gustatory stimulation (sour/cold)  -ML  gustatory stimulation (sour/cold)  -SM     Increase Timing  prepping - 3 second prep or suck swallow or 3-step swallow  -ML  prepping - 3 second prep or suck swallow or 3-step swallow  -SM     Increase Anterior Movement of the Hyolaryngeal Complex  chin tuck against resistance (CTAR)  -ML  chin tuck against resistance (CTAR)  -SM     Increase Closure at Entrance to Airway/Closure of Airway at Glottis  breath hold exercises  -ML  breath  hold exercises volitional cough  -SM     Increase Squeeze/Positive Pressure Generation  hard effortful swallow  -ML  hard effortful swallow  -SM     [place]/Accuracy (Pharyngeal Strengthening Goal 1, SLP)  with minimal cues (75-90% accuracy)  -ML  with minimal cues (75-90% accuracy)  -SM     Time Frame (Pharyngeal Strengthening Goal 1, SLP)  short term goal (STG)  -ML  short term goal (STG)  -SM     Barriers (Pharyngeal Strengthening Goal 1, SLP)  No swallow achieved on command or with ice chips- tongue pumping 1x  -ML  --     Progress/Outcomes (Pharyngeal Strengthening Goal 1, SLP)  progress slower than expected  -ML  --        Comprehend Questions Goal 1 (SLP)    Improve Ability to Comprehend Questions Goal 1 (SLP)  simple yes/no questions;100%;with minimal cues (75-90%)  -ML  simple yes/no questions;100%;with minimal cues (75-90%)  -SM     Time Frame (Comprehend Questions Goal 1, SLP)  short term goal (STG)  -ML  short term goal (STG)  -SM     Progress/Outcomes (Comprehend Questions Goal 1, SLP)  continuing progress toward goal  -ML  --     Comment (Comprehend Questions Goal 1, SLP)  Thumbs up for yes- did not achieve no gesture-60%  -ML  --        Follow Directions Goal 2 (SLP)    Improve Ability to Follow Directions Goal 1 (SLP)  1 step direction without objects;60%;with moderate cues (50-74%)  -ML  --     Time Frame (Follow Directions Goal 1, SLP)  short term goal (STG);by discharge  -ML  --     Progress (Ability to Follow Directions Goal 1, SLP)  40%;with moderate cues (50-74%)  -ML  --     Progress/Outcomes (Follow Directions Goal 1, SLP)  continuing progress toward goal  -ML  --     Comment (Follow Directions Goal 1, SLP)  Follows some one step and/or complies with model  -ML  --        Word Retrieval Skills Goal 1 (SLP)    Improve Word Retrieval Skills By Goal 1 (SLP)  completing automatic speech task, counting;repeating sounds;completing open ended structured sentence;50%;with moderate cues (50-74%)   -ML  completing automatic speech task, counting;repeating sounds;completing open ended structured sentence;50%;with moderate cues (50-74%)  -SM     Time Frame (Word Retrieval Goal 1, SLP)  short term goal (STG)  -ML  short term goal (STG)  -SM     Progress (Word Retrieval Skills Goal 1, SLP)  0% No speech  -ML  --     Progress/Outcomes (Word Retrieval Goal 1, SLP)  progress slower than expected  -ML  --     Comment (Word Retrieval Goal 1, SLP)  No speech- no speech attempt  -ML  --        Motor Planning Goal 1 (SLP)    Improve Motor Planning to Reduce Apraxia by Goal 1 (SLP)  imitating mouth postures;imitating vowels;following isolated oral commands;50%;with moderate cues (50-74%)  -ML  imitating mouth postures;imitating vowels;following isolated oral commands;50%;with moderate cues (50-74%)  -SM     Time Frame (Motor Planning Goal 1, SLP)  short term goal (STG)  -ML  short term goal (STG)  -SM     Progress (Motor Planning Goal 1, SLP)  0%;with maximum cues (25-49%)  -ML  --     Progress/Outcomes (Motor Planning Goal 1, SLP)  progress slower than expected  -ML  --     Comment (Motor Planning Goal 1, SLP)  Unable to imitate oral movement  -ML  --        Augmentative/Alternative Communication Objectives Goal 1 (SLP    Communication (Augmentative/Alternative Communication Goal 1, SLP)  improve ability to use non-verbal communication strategies  -ML  improve ability to use non-verbal communication strategies  -SM     Improve Communication by (Augmentative/Alternative Communication Goal 1, SLP)  use gestures to communicate;alphabet/picture board;60%;with minimal cues (75-90%)  -ML  use gestures to communicate;alphabet/picture board;60%;with minimal cues (75-90%)  -SM     Time Frame (Augmentative/Alternative Communication Goal 1, SLP)  short term goal (STG)  -ML  short term goal (STG)  -SM     Comment (Augmentative/Alternative Communication Goal 1, SLP)  Not addressed today beyond y/n gesture  -ML  --        Additional  Goal 1 (SLP)    Additional Goal 1, SLP  --  LTG: Improve communication in order to participate in care while in hospital setting with 80% accuracy and cues  -     Time Frame (Additional Goal 1, SLP)  --  by discharge  -       User Key  (r) = Recorded By, (t) = Taken By, (c) = Cosigned By    Initials Name Provider Type    Caren Arias, MS CCC-SLP Speech and Language Pathologist    Jocelyne Michael CCC-SLP Speech and Language Pathologist          EDUCATION  The patient has been educated in the following areas:   Communication Impairment.    SLP Recommendation and Plan  Daily Summary of Progress (SLP): progress toward functional goals is gradual  Barriers to Overall Progress (SLP): Alertness level  Plan for Continued Treatment (SLP): Ongoing dysphagia and communication tx  Anticipated Dischage Disposition: inpatient rehabilitation facility                    Time Calculation:   Time Calculation- SLP     Row Name 02/01/20 1140             Time Calculation- SLP    SLP Start Time  1100  -ML      SLP Received On  02/01/20  -ML        User Key  (r) = Recorded By, (t) = Taken By, (c) = Cosigned By    Initials Name Provider Type    Jocelyne Michael CCC-SLP Speech and Language Pathologist          Therapy Charges for Today     Code Description Service Date Service Provider Modifiers Qty    97363570502 HC ST TREATMENT SWALLOW 2 2/1/2020 Jocelyne Okeefe CCC-SLP GN 1    44722449752 HC ST TREATMENT SPEECH 1 2/1/2020 Jocelyne Okeefe CCC-SLP GN 1                 ANSELMO Green  2/1/2020

## 2020-02-01 NOTE — PLAN OF CARE
Problem: Patient Care Overview  Goal: Plan of Care Review  Outcome: Ongoing (interventions implemented as appropriate)  Flowsheets (Taken 2/1/2020 8338)  Plan of Care Reviewed With: patient  Outcome Summary: PT IE completed. Patient presents with deficits in functional mobility limited by R UE/LE weakness warranting further PTx. Patient communicated throughout session using thumbs up/down method indicating yes/no. Patient required mod.Ax2 for rolling to R with max.Ax2 needed for L rolling for sling placement. Functional mobility limited throughout session due to increased patient lethargy. Significant education provided to significant other regarding rehab process and role of PT. Recommend d/c to inpatient rehab.

## 2020-02-01 NOTE — THERAPY EVALUATION
Patient Name: Yee Goodwin  : 1970    MRN: [id number]                              Today's Date: 2020       Admit Date: 2020    Visit Dx:     ICD-10-CM ICD-9-CM   1. Acute intracerebral hemorrhage (CMS/HCC) I61.9 431   2. L frontal ICH  I62.9 432.9   3. Intracranial hemorrhage (CMS/HCC) I62.9 432.9   4. Dysphagia, unspecified type R13.10 787.20   5. Aphasia R47.01 784.3   6. Apraxia R48.2 784.69   7. Impaired mobility and ADLs Z74.09 799.89     Patient Active Problem List   Diagnosis   • History of right hip replacement   • PUD (peptic ulcer disease)   • S/P madan   • Essential hypertension   • Gastritis   • JAYESH (obstructive sleep apnea)   • Epigastric pain   • Diarrhea   • Diverticulosis   • Hiatal hernia   • Tobacco abuse   • Acute L frontal ICH 20   • Hypertensive emergency   • Elevated transaminase levels   • Dyslipidemia   • Acute hypoxemic/hypercarbic respiratory failure due to ICH. Required Ventilatory support   • Acute intracerebral hemorrhage (CMS/HCC)   • S/P L frontal craniotomy and evacuation of intraparenchymal hematoma 20     Past Medical History:   Diagnosis Date   • Arthritis    • Diverticulitis    • GERD (gastroesophageal reflux disease)    • Hypertension      Past Surgical History:   Procedure Laterality Date   • CHOLECYSTECTOMY     • COLONOSCOPY     • CRANIOTOMY FOR SUBDURAL HEMATOMA Left 2020    Procedure: FRONTAL CRANIOTOMY FOR EVACUATION OF INTRAPARENCHYMAL HEMATOMA  LEFT;  Surgeon: Jose Cole MD;  Location: [facility];  Service: Neurosurgery   • HAND TENDON SURGERY Right     patient hand right thumb tendon surgery   • HIP SURGERY     • JOINT REPLACEMENT     • TUBAL ABDOMINAL LIGATION       General Information     Row Name 20 0922          PT Evaluation Time/Intention    Document Type  evaluation  -MP     Mode of Treatment  physical therapy  -MP     Row Name 2022          General Information    Patient Profile Reviewed?  yes  -MP      Prior Level of Function  independent:;transfer;dressing;bed mobility;ADL's;gait;community mobility;bathing  -MP     Existing Precautions/Restrictions  fall;oxygen therapy device and L/min R sided hemiplegia; NG; expressive aphasia  -MP     Barriers to Rehab  medically complex  -MP     Row Name 02/01/20 0922          Relationship/Environment    Lives With  significant other  -MP     Row Name 02/01/20 0922          Resource/Environmental Concerns    Current Living Arrangements  home/apartment/condo  -MP     Row Name 02/01/20 0922          Home Main Entrance    Number of Stairs, Main Entrance  one  -MP     Row Name 02/01/20 0922          Stairs Within Home, Primary    Number of Stairs, Within Home, Primary  none  -MP     Row Name 02/01/20 0922          Cognitive Assessment/Intervention- PT/OT    Orientation Status (Cognition)  oriented x 3;verbal cues/prompts needed for orientation  -MP     Cognitive Assessment/Intervention Comment  Communicating with thumbs up/down at this time while remaining nonverbal throughout session. Patient was able to answer yes/no questions for orientation.  -MP     Row Name 02/01/20 0922          Safety Issues, Functional Mobility    Safety Issues Affecting Function (Mobility)  ability to follow commands;sequencing abilities  -MP     Impairments Affecting Function (Mobility)  balance;cognition;coordination;endurance/activity tolerance;strength;visual/perceptual  -MP       User Key  (r) = Recorded By, (t) = Taken By, (c) = Cosigned By    Initials Name Provider Type    Chas Colby PT Physical Therapist        Mobility     Row Name 02/01/20 0927          Bed Mobility Assessment/Treatment    Bed Mobility Assessment/Treatment  rolling left;rolling right  -MP     Rolling Left [place] (Bed Mobility)  maximum assist (25% patient effort);2 person assist  -MP     Rolling Right [place] (Bed Mobility)  moderate assist (50% patient effort);2 person assist  -MP     Comment (Bed  Mobility)  Patient performed rollingx2 to mechanical sling placement. EOB activity deferred this date secondary to increase patient lethargy.  -     Row Name 02/01/20 0927          Transfer Assessment/Treatment    Comment (Transfers)  Mechanical lift transfer to chair.  -     Row Name 02/01/20 0927          Bed-Chair Transfer    Bed-Chair [place] (Transfers)  dependent (less than 25% patient effort)  -     Assistive Device (Bed-Chair Transfers)  mechanical lift/aid  -     Row Name 02/01/20 0927          Gait/Stairs Assessment/Training    Comment (Gait/Stairs)  Not appropriate to assess this date.  -       User Key  (r) = Recorded By, (t) = Taken By, (c) = Cosigned By    Initials Name Provider Type    Chas Colby PT Physical Therapist        Obj/Interventions     Row Name 02/01/20 0930          General ROM    GENERAL ROM COMMENTS  B LE PROM grossly WFL; L LE AROM WFL  -     Row Name 02/01/20 0930          MMT (Manual Muscle Testing)    General MMT Comments  No active movement noted in R LE; L LE grossly 3+/5 with patient being able to perform supine SLR.  -     Row Name 02/01/20 0930          Therapeutic Exercise    Comment (Therapeutic Exercise)  Formal education on Ther Ex deferred this date secondary to patient lethargy; patient able to perform L ankle pump, supine SLR, heel slide without difficulty.  -     Row Name 02/01/20 0930          Static Sitting Balance    Level of [place] (Unsupported Sitting, Static Balance)  dependent, less than 25% patient effort  -MP     Sitting Position (Unsupported Sitting, Static Balance)  sitting in chair  -MP     Time Able to Maintain Position (Unsupported Sitting, Static Balance)  15 to 30 seconds  -MP     Comment (Unsupported Sitting, Static Balance)  Patient unable to sit unsupported in chair at this time.  -     Row Name 02/01/20 0930          Sensory Assessment/Intervention    Sensory General Assessment  -- Assess through yes/no with thumb  movement; patient able to accurately say yes when L LE touched. Unable to identify R LE touch.   -MP       User Key  (r) = Recorded By, (t) = Taken By, (c) = Cosigned By    Initials Name Provider Type    Chas Colby PT Physical Therapist        Goals/Plan     Row Name 02/01/20 0938          Bed Mobility Goal 1 (PT)    Activity/Assistive Device (Bed Mobility Goal 1, PT)  sit to supine;supine to sit  -MP     [place] Level/Cues Needed (Bed Mobility Goal 1, PT)  minimum assist (75% or more patient effort)  -MP     Time Frame (Bed Mobility Goal 1, PT)  2 weeks  -MP     Progress/Outcomes (Bed Mobility Goal 1, PT)  goal ongoing  -MP     Row Name 02/01/20 0938          Transfer Goal 1 (PT)    Activity/Assistive Device (Transfer Goal 1, PT)  sit-to-stand/stand-to-sit  -MP     [place] Level/Cues Needed (Transfer Goal 1, PT)  minimum assist (75% or more patient effort)  -MP     Time Frame (Transfer Goal 1, PT)  2 weeks  -MP     Progress/Outcome (Transfer Goal 1, PT)  goal ongoing  -MP     Row Name 02/01/20 0938          Gait Training Goal 1 (PT)    Activity/Assistive Device (Gait Training Goal 1, PT)  gait (walking locomotion);assistive device use  -MP     [place] Level (Gait Training Goal 1, PT)  moderate assist (50-74% patient effort)  -MP     Distance (Gait Goal 1, PT)  75'  -MP     Time Frame (Gait Training Goal 1, PT)  2 weeks  -MP     Progress/Outcome (Gait Training Goal 1, PT)  goal ongoing  -MP     Row Name 02/01/20 0938          ROM Goal 1 (PT)    Time Frame (ROM Goal 1, PT)  2 weeks  -MP       User Key  (r) = Recorded By, (t) = Taken By, (c) = Cosigned By    Initials Name Provider Type    Chas Colby PT Physical Therapist        Clinical Impression     Row Name 02/01/20 0934          Pain Assessment    Additional Documentation  Pain Scale: FACES Pre/Post-Treatment (Group)  -MP     Row Name 02/01/20 0934          Pain Scale: FACES Pre/Post-Treatment    Pain: FACES Scale, Pretreatment   0-->no hurt  -MP     Pain: FACES Scale, Post-Treatment  0-->no hurt  -MP     Row Name 02/01/20 0934          Physical Therapy Clinical Impression    Patient/Family Goals Statement (PT Clinical Impression)  Return to PLOF; improve functional mobility  -MP     Criteria for Skilled Interventions Met (PT Clinical Impression)  yes;treatment indicated  -MP     Rehab Potential (PT Clinical Summary)  good, to achieve stated therapy goals  -MP     Row Name 02/01/20 0934          Vital Signs    Pre Systolic BP Rehab  157  -MP     Pre Treatment Diastolic BP  92  -MP     Pretreatment Heart Rate (beats/min)  113  -MP     Posttreatment Heart Rate (beats/min)  105  -MP     Pre SpO2 (%)  95  -MP     O2 Delivery Pre Treatment  supplemental O2  -MP     O2 Delivery Intra Treatment  supplemental O2  -MP     Post SpO2 (%)  93  -MP     O2 Delivery Post Treatment  supplemental O2  -MP     Pre Patient Position  Supine  -MP     Intra Patient Position  Sitting  -MP     Post Patient Position  Sitting  -MP     Row Name 02/01/20 0934          Positioning and Restraints    Pre-Treatment Position  in bed  -MP     Post Treatment Position  chair  -MP     In Chair  reclined;call light within reach;encouraged to call for assist;exit alarm on;with family/caregiver;RUE elevated;LUE elevated;legs elevated;heels elevated;waffle cushion;on mechanical lift sling  -MP       User Key  (r) = Recorded By, (t) = Taken By, (c) = Cosigned By    Initials Name Provider Type    Chas Colby, PT Physical Therapist        Outcome Measures     Row Name 02/01/20 0940          How much help from another person do you currently need...    Turning from your back to your side while in flat bed without using bedrails?  2  -MP     Moving from lying on back to sitting on the side of a flat bed without bedrails?  2  -MP     Moving to and from a bed to a chair (including a wheelchair)?  1  -MP     Standing up from a chair using your arms (e.g., wheelchair, bedside chair)?   2  -MP     Climbing 3-5 steps with a railing?  1  -MP     To walk in hospital room?  1  -MP     AM-PAC 6 Clicks Score (PT)  9  -MP     Row Name 02/01/20 0940          Modified [place] Scale    Pre-Stroke Modified [place] Scale  0 - No Symptoms at all.  -MP     Modified [place] Scale  5 - Severe disability.  Bedridden, incontinent, and requiring constant nursing care and attention.  -MP     Row Name 02/01/20 0940          Functional Assessment    Outcome Measure Options  Modified [place];AM-PAC 6 Clicks Basic Mobility (PT)  -MP       User Key  (r) = Recorded By, (t) = Taken By, (c) = Cosigned By    Initials Name Provider Type    Chas Colby PT Physical Therapist          PT Recommendation and Plan  Planned Therapy Interventions (PT Eval): balance training, bed mobility training, gait training, home exercise program, ROM (range of motion), strengthening, transfer training, neuromuscular re-education  Outcome Summary/Treatment Plan (PT)  Anticipated Discharge Disposition (PT): inpatient rehabilitation facility  Plan of Care Reviewed With: patient  Outcome Summary: PT IE completed. Patient presents with deficits in functional mobility limited by R UE/LE weakness warranting further PTx. Patient communicated throughout session using thumbs up/down method indicating yes/no. Patient required mod.Ax2 for rolling to R with max.Ax2 needed for L rolling for sling placement. Functional mobility limited throughout session due to increased patient lethargy. Significant education provided to significant other regarding rehab process and role of PT. Recommend d/c to inpatient rehab.     Time Calculation:   PT Charges     Row Name 02/01/20 0822             Time Calculation    Start Time  0822  -MP      PT Received On  02/01/20  -      PT Goal Re-Cert Due Date  02/11/20  -MP         Time Calculation- PT    Total Timed Code Minutes- PT  10 minute(s)  -MP         Timed Charges    44893 - PT Therapeutic Activity Minutes  10  -MP         User Key  (r) = Recorded By, (t) = Taken By, (c) = Cosigned By    Initials Name Provider Type    Chas Colby PT Physical Therapist        Therapy Charges for Today     Code Description Service Date Service Provider Modifiers Qty    83948594703 HC PT THERAPEUTIC ACT EA 15 MIN 2/1/2020 Chas Austin, PT GP 1    82473652829 HC PT EVAL MOD COMPLEXITY 4 2/1/2020 Chas Austin, PT GP 1    59284079585 HC PT THER SUPP EA 15 MIN 2/1/2020 Chas Austin, PT GP 2          PT G-Codes  Outcome Measure Options: Modified [place], AM-PAC 6 Clicks Basic Mobility (PT)  AM-PAC 6 Clicks Score (PT): 9  Modified [place] Scale: 5 - Severe disability.  Bedridden, incontinent, and requiring constant nursing care and attention.    Chas Austin PT  2/1/2020

## 2020-02-01 NOTE — PLAN OF CARE
Problem: Patient Care Overview  Goal: Plan of Care Review  Outcome: Ongoing (interventions implemented as appropriate)  Flowsheets (Taken 2/1/2020 0974)  Consent Given to Review Plan with: OT initial eval and and expanded chart review completed. Pt presents with multiple comorbidities and decreased independence with ADL's and mobility. Recommend continued skilled OT services and IRF at d/c.

## 2020-02-02 ENCOUNTER — APPOINTMENT (OUTPATIENT)
Dept: GENERAL RADIOLOGY | Facility: HOSPITAL | Age: 50
End: 2020-02-02

## 2020-02-02 LAB
ALBUMIN SERPL-MCNC: 3.9 G/DL (ref 3.5–5.2)
ANION GAP SERPL CALCULATED.3IONS-SCNC: 14 MMOL/L (ref 5–15)
BASOPHILS # BLD AUTO: 0.05 10*3/MM3 (ref 0–0.2)
BASOPHILS NFR BLD AUTO: 0.3 % (ref 0–1.5)
BUN BLD-MCNC: 13 MG/DL (ref 6–20)
BUN/CREAT SERPL: 40.6 (ref 7–25)
CALCIUM SPEC-SCNC: 9.4 MG/DL (ref 8.6–10.5)
CHLORIDE SERPL-SCNC: 88 MMOL/L (ref 98–107)
CO2 SERPL-SCNC: 25 MMOL/L (ref 22–29)
CREAT BLD-MCNC: 0.32 MG/DL (ref 0.57–1)
DEPRECATED RDW RBC AUTO: 40.5 FL (ref 37–54)
EOSINOPHIL # BLD AUTO: 0.04 10*3/MM3 (ref 0–0.4)
EOSINOPHIL NFR BLD AUTO: 0.2 % (ref 0.3–6.2)
ERYTHROCYTE [DISTWIDTH] IN BLOOD BY AUTOMATED COUNT: 12.2 % (ref 12.3–15.4)
GFR SERPL CREATININE-BSD FRML MDRD: >150 ML/MIN/1.73
GLUCOSE BLD-MCNC: 143 MG/DL (ref 65–99)
GLUCOSE BLDC GLUCOMTR-MCNC: 112 MG/DL (ref 70–130)
GLUCOSE BLDC GLUCOMTR-MCNC: 138 MG/DL (ref 70–130)
GLUCOSE BLDC GLUCOMTR-MCNC: 138 MG/DL (ref 70–130)
GLUCOSE BLDC GLUCOMTR-MCNC: 140 MG/DL (ref 70–130)
GLUCOSE BLDC GLUCOMTR-MCNC: 154 MG/DL (ref 70–130)
HCT VFR BLD AUTO: 38.9 % (ref 34–46.6)
HGB BLD-MCNC: 13 G/DL (ref 12–15.9)
IMM GRANULOCYTES # BLD AUTO: 0.19 10*3/MM3 (ref 0–0.05)
IMM GRANULOCYTES NFR BLD AUTO: 1.1 % (ref 0–0.5)
LYMPHOCYTES # BLD AUTO: 1.54 10*3/MM3 (ref 0.7–3.1)
LYMPHOCYTES NFR BLD AUTO: 8.7 % (ref 19.6–45.3)
MCH RBC QN AUTO: 30.4 PG (ref 26.6–33)
MCHC RBC AUTO-ENTMCNC: 33.4 G/DL (ref 31.5–35.7)
MCV RBC AUTO: 90.9 FL (ref 79–97)
MONOCYTES # BLD AUTO: 1.74 10*3/MM3 (ref 0.1–0.9)
MONOCYTES NFR BLD AUTO: 9.8 % (ref 5–12)
NEUTROPHILS # BLD AUTO: 14.19 10*3/MM3 (ref 1.7–7)
NEUTROPHILS NFR BLD AUTO: 79.9 % (ref 42.7–76)
NRBC BLD AUTO-RTO: 0 /100 WBC (ref 0–0.2)
PHOSPHATE SERPL-MCNC: 2.7 MG/DL (ref 2.5–4.5)
PLATELET # BLD AUTO: 391 10*3/MM3 (ref 140–450)
PMV BLD AUTO: 9.7 FL (ref 6–12)
POTASSIUM BLD-SCNC: 3.8 MMOL/L (ref 3.5–5.2)
RBC # BLD AUTO: 4.28 10*6/MM3 (ref 3.77–5.28)
SODIUM BLD-SCNC: 127 MMOL/L (ref 136–145)
WBC NRBC COR # BLD: 17.75 10*3/MM3 (ref 3.4–10.8)

## 2020-02-02 PROCEDURE — 71045 X-RAY EXAM CHEST 1 VIEW: CPT

## 2020-02-02 PROCEDURE — 25010000002 HYDROMORPHONE PER 4 MG: Performed by: PHYSICIAN ASSISTANT

## 2020-02-02 PROCEDURE — 25010000003 CEFAZOLIN 1-4 GM/50ML-% SOLUTION: Performed by: NURSE PRACTITIONER

## 2020-02-02 PROCEDURE — 74018 RADEX ABDOMEN 1 VIEW: CPT

## 2020-02-02 PROCEDURE — 80069 RENAL FUNCTION PANEL: CPT | Performed by: INTERNAL MEDICINE

## 2020-02-02 PROCEDURE — 92610 EVALUATE SWALLOWING FUNCTION: CPT

## 2020-02-02 PROCEDURE — 99024 POSTOP FOLLOW-UP VISIT: CPT | Performed by: PHYSICIAN ASSISTANT

## 2020-02-02 PROCEDURE — 82962 GLUCOSE BLOOD TEST: CPT

## 2020-02-02 PROCEDURE — 94799 UNLISTED PULMONARY SVC/PX: CPT

## 2020-02-02 PROCEDURE — 99233 SBSQ HOSP IP/OBS HIGH 50: CPT | Performed by: INTERNAL MEDICINE

## 2020-02-02 PROCEDURE — 85025 COMPLETE CBC W/AUTO DIFF WBC: CPT | Performed by: INTERNAL MEDICINE

## 2020-02-02 RX ORDER — OXYMETAZOLINE HYDROCHLORIDE 0.05 G/100ML
2 SPRAY NASAL ONCE
Status: COMPLETED | OUTPATIENT
Start: 2020-02-02 | End: 2020-02-02

## 2020-02-02 RX ORDER — CEFAZOLIN SODIUM 2 G/100ML
2 INJECTION, SOLUTION INTRAVENOUS ONCE
Status: COMPLETED | OUTPATIENT
Start: 2020-02-03 | End: 2020-02-03

## 2020-02-02 RX ORDER — CEFAZOLIN SODIUM 1 G/50ML
1 INJECTION, SOLUTION INTRAVENOUS EVERY 8 HOURS
Status: DISCONTINUED | OUTPATIENT
Start: 2020-02-03 | End: 2020-02-07 | Stop reason: HOSPADM

## 2020-02-02 RX ORDER — POTASSIUM CHLORIDE 1.5 G/1.77G
30 POWDER, FOR SOLUTION ORAL DAILY
Status: DISCONTINUED | OUTPATIENT
Start: 2020-02-03 | End: 2020-02-04

## 2020-02-02 RX ORDER — LIDOCAINE HYDROCHLORIDE 20 MG/ML
JELLY TOPICAL ONCE
Status: COMPLETED | OUTPATIENT
Start: 2020-02-02 | End: 2020-02-02

## 2020-02-02 RX ORDER — CEFAZOLIN SODIUM 1 G/50ML
1 INJECTION, SOLUTION INTRAVENOUS ONCE
Status: COMPLETED | OUTPATIENT
Start: 2020-02-02 | End: 2020-02-02

## 2020-02-02 RX ORDER — HYDROCHLOROTHIAZIDE 25 MG/1
25 TABLET ORAL DAILY
Status: DISCONTINUED | OUTPATIENT
Start: 2020-02-03 | End: 2020-02-03

## 2020-02-02 RX ORDER — POTASSIUM CHLORIDE 1.5 G/1.77G
30 POWDER, FOR SOLUTION ORAL ONCE
Status: COMPLETED | OUTPATIENT
Start: 2020-02-02 | End: 2020-02-02

## 2020-02-02 RX ADMIN — LISINOPRIL 20 MG: 20 TABLET ORAL at 12:22

## 2020-02-02 RX ADMIN — POTASSIUM CHLORIDE 30 MEQ: 1.5 POWDER, FOR SOLUTION ORAL at 22:09

## 2020-02-02 RX ADMIN — LABETALOL 20 MG/4 ML (5 MG/ML) INTRAVENOUS SYRINGE 20 MG: at 13:33

## 2020-02-02 RX ADMIN — SODIUM CHLORIDE 15 MG/HR: 9 INJECTION, SOLUTION INTRAVENOUS at 16:04

## 2020-02-02 RX ADMIN — ACETAMINOPHEN 650 MG: 325 TABLET, FILM COATED ORAL at 12:22

## 2020-02-02 RX ADMIN — SODIUM CHLORIDE 10 MG/HR: 9 INJECTION, SOLUTION INTRAVENOUS at 08:13

## 2020-02-02 RX ADMIN — SODIUM CHLORIDE 7.5 MG/HR: 9 INJECTION, SOLUTION INTRAVENOUS at 06:51

## 2020-02-02 RX ADMIN — SODIUM CHLORIDE 15 MG/HR: 9 INJECTION, SOLUTION INTRAVENOUS at 18:14

## 2020-02-02 RX ADMIN — SODIUM CHLORIDE 10 MG/HR: 9 INJECTION, SOLUTION INTRAVENOUS at 23:58

## 2020-02-02 RX ADMIN — IPRATROPIUM BROMIDE AND ALBUTEROL SULFATE 3 ML: 2.5; .5 SOLUTION RESPIRATORY (INHALATION) at 07:56

## 2020-02-02 RX ADMIN — PANTOPRAZOLE SODIUM 40 MG: 40 INJECTION, POWDER, FOR SOLUTION INTRAVENOUS at 08:13

## 2020-02-02 RX ADMIN — IPRATROPIUM BROMIDE AND ALBUTEROL SULFATE 3 ML: 2.5; .5 SOLUTION RESPIRATORY (INHALATION) at 20:28

## 2020-02-02 RX ADMIN — Medication 2 SPRAY: at 11:00

## 2020-02-02 RX ADMIN — ACETAMINOPHEN 650 MG: 325 TABLET, FILM COATED ORAL at 04:13

## 2020-02-02 RX ADMIN — SODIUM CHLORIDE, PRESERVATIVE FREE 10 ML: 5 INJECTION INTRAVENOUS at 21:56

## 2020-02-02 RX ADMIN — SODIUM CHLORIDE 7.5 MG/HR: 9 INJECTION, SOLUTION INTRAVENOUS at 00:24

## 2020-02-02 RX ADMIN — IPRATROPIUM BROMIDE AND ALBUTEROL SULFATE 3 ML: 2.5; .5 SOLUTION RESPIRATORY (INHALATION) at 16:59

## 2020-02-02 RX ADMIN — SODIUM CHLORIDE 5 MG/HR: 9 INJECTION, SOLUTION INTRAVENOUS at 20:25

## 2020-02-02 RX ADMIN — HYDROMORPHONE HYDROCHLORIDE 0.25 MG: 1 INJECTION, SOLUTION INTRAMUSCULAR; INTRAVENOUS; SUBCUTANEOUS at 16:04

## 2020-02-02 RX ADMIN — LIDOCAINE HYDROCHLORIDE: 20 JELLY TOPICAL at 11:00

## 2020-02-02 RX ADMIN — SODIUM CHLORIDE 7.5 MG/HR: 9 INJECTION, SOLUTION INTRAVENOUS at 04:05

## 2020-02-02 RX ADMIN — SODIUM CHLORIDE 15 MG/HR: 9 INJECTION, SOLUTION INTRAVENOUS at 13:33

## 2020-02-02 RX ADMIN — SODIUM CHLORIDE 12.5 MG/HR: 9 INJECTION, SOLUTION INTRAVENOUS at 12:23

## 2020-02-02 RX ADMIN — IPRATROPIUM BROMIDE AND ALBUTEROL SULFATE 3 ML: 2.5; .5 SOLUTION RESPIRATORY (INHALATION) at 12:01

## 2020-02-02 RX ADMIN — HYDROMORPHONE HYDROCHLORIDE 0.25 MG: 1 INJECTION, SOLUTION INTRAMUSCULAR; INTRAVENOUS; SUBCUTANEOUS at 00:28

## 2020-02-02 RX ADMIN — CEFAZOLIN SODIUM 1 G: 1 INJECTION, SOLUTION INTRAVENOUS at 22:10

## 2020-02-02 NOTE — PLAN OF CARE
Problem: Patient Care Overview  Goal: Plan of Care Review  Outcome: Ongoing (interventions implemented as appropriate)  Flowsheets (Taken 2/2/2020 8657)  Plan of Care Reviewed With: patient; spouse  Note:   SLP re-evaluation completed. Will continue to address dysphagia w/ treatment. Please see note for further details and recommendations.

## 2020-02-02 NOTE — THERAPY RE-EVALUATION
Acute Care - Speech Language Pathology   Swallow Re-Evaluation [facility]   Clinical Swallow Re-evaluation       Patient Name: Yee Goodwin  : 1970  MRN: [id number]  Today's Date: 2020  Onset of Illness/Injury or Date of Surgery: 20     Referring Physician: Dr. Cole      Admit Date: 2020    Visit Dx:     ICD-10-CM ICD-9-CM   1. Acute intracerebral hemorrhage (CMS/HCC) I61.9 431   2. L frontal ICH  I62.9 432.9   3. Intracranial hemorrhage (CMS/HCC) I62.9 432.9   4. Dysphagia, unspecified type R13.10 787.20   5. Aphasia R47.01 784.3   6. Apraxia R48.2 784.69   7. Impaired mobility and ADLs Z74.09 799.89     Patient Active Problem List   Diagnosis   • History of right hip replacement   • PUD (peptic ulcer disease)   • S/P madan   • Essential hypertension   • Gastritis   • JAYESH (obstructive sleep apnea)   • Epigastric pain   • Diarrhea   • Diverticulosis   • Hiatal hernia   • Tobacco abuse   • Acute L frontal ICH 20   • Hypertensive emergency   • Elevated transaminase levels   • Dyslipidemia   • Acute hypoxemic/hypercarbic respiratory failure due to ICH. Required Ventilatory support   • Acute intracerebral hemorrhage (CMS/HCC)   • S/P L frontal craniotomy and evacuation of intraparenchymal hematoma 20     Past Medical History:   Diagnosis Date   • Arthritis    • Diverticulitis    • GERD (gastroesophageal reflux disease)    • Hypertension      Past Surgical History:   Procedure Laterality Date   • CHOLECYSTECTOMY     • COLONOSCOPY     • CRANIOTOMY FOR SUBDURAL HEMATOMA Left 2020    Procedure: FRONTAL CRANIOTOMY FOR EVACUATION OF INTRAPARENCHYMAL HEMATOMA  LEFT;  Surgeon: Jose Cole MD;  Location: [facility];  Service: Neurosurgery   • HAND TENDON SURGERY Right     patient hand right thumb tendon surgery   • HIP SURGERY     • JOINT REPLACEMENT     • TUBAL ABDOMINAL LIGATION          SWALLOW EVALUATION (last 72 hours)      SLP Adult Swallow Evaluation     Row Name  02/02/20 1100 01/31/20 1415                Rehab Evaluation    Document Type  re-evaluation  -VO  evaluation  -       Subjective Information  no complaints  -VO  no complaints  -SM       Patient Observations  lethargic;cooperative  -VO  alert;cooperative  -       Patient/Family Observations  spouse present  -VO  Sister present  -SM       Patient Effort  good  -VO  --          General Information    Patient Profile Reviewed  yes  -VO  yes  -SM       Pertinent History Of Current Problem  L frontal ICH s/p crani  -VO  L frontal ICH, s/p crani. RN reports NG could not be passed through nares.   -       Current Method of Nutrition  NPO;nasogastric feedings  -VO  NPO  -       Precautions/Limitations, Vision  neglect, right;for purposes of eval  -VO  --       Precautions/Limitations, Hearing  WFL;for purposes of eval  -VO  --       Prior Level of Function-Communication  WFL  -VO  WFL  -       Prior Level of Function-Swallowing  safe, efficient swallowing in all situations  -VO  safe, efficient swallowing in all situations  -       Plans/Goals Discussed with  patient and family;agreed upon  -VO  patient and family;agreed upon  -       Barriers to Rehab  medically complex  -VO  none identified  -       Patient's Goals for Discharge  patient could not state  -VO  patient could not state  -       Family Goals for Discharge  patient able to return to PO diet  -VO  patient able to return to PO diet;patient able to eat/drink without coughing/choking  -          Pain Assessment    Additional Documentation  --  Pain Scale: Numbers Pre/Post-Treatment (Group)  -          Pain Scale: Numbers Pre/Post-Treatment    Pain Scale: Numbers, Pretreatment  0/10 - no pain  -VO  0/10 - no pain  -SM       Pain Scale: Numbers, Post-Treatment  0/10 - no pain  -VO  0/10 - no pain  -          Oral Motor and Function    Dentition Assessment  natural, present and adequate  -VO  --       Secretion Management  problems swallowing  secretions  -VO  requires suctioning to control secretions  -SM       Mucosal Quality  sticky  -VO  --       Gag Response  --  absent or diminished  -SM       Volitional Swallow  unable to elicit  -VO  unable to elicit  -SM       Volitional Cough  unable to elicit  -VO  unable to elicit  -SM          Oral Musculature and Cranial Nerve Assessment    Oral Motor General Assessment  generalized oral motor weakness;lingual impairment;oral labial or buccal impairment;vocal impairment  -VO  --       Mandibular Impairment Detail, Cranial Nerve V (Trigeminal)  reduced mandibular ROM;reduced strength on right  -VO  reduced mandibular ROM;reduced strength on right  -SM       Oral Labial or Buccal Impairment, Detail, Cranial Nerve VII (Facial):  right labial droop;reduced ROM;reduced strength bilaterally  -VO  right labial droop;reduced ROM;reduced strength bilaterally  -SM       Lingual Impairment, Detail. Cranial Nerves IX, XII (Glossopharyngeal and Hypoglossal)  bilaterally;reduced lingual ROM  -VO  reduced lingual ROM;reduced strength;bilaterally;decreased gag sensation (sensation to posterior 1/3 of tongue) no lingual movement appreciated  -SM       Oral Motor, Comment  difficulty volitionally phonating  -VO  --          General Eating/Swallowing Observations    Respiratory Support Currently in Use  nasal cannula  -VO  --       Eating/Swallowing Skills  fed by SLP  -VO  --       Positioning During Eating  upright 90 degree;upright in bed  -VO  --       Utensils Used  spoon  -VO  --       Consistencies Trialed  thin liquids;pureed  -VO  --          Clinical Swallow Eval    Oral Prep Phase  impaired  -VO  --       Oral Transit  impaired  -VO  --       Oral Residue  impaired  -VO  --       Pharyngeal Phase  suspected pharyngeal impairment  -VO  suspected pharyngeal impairment  -SM       Clinical Swallow Evaluation Summary  CSE completed this AM: Pt opened lips to accept PO and trialed ice chip, tsp of thins, and 1/3 tsp of  puree. Reduced lip opening, lingual ROM/strength, and labial wkns. Incr'd prep w/ all, however did swallow all trials presented w/ 3-4 weak multiple swallows. Improvement from previous exams; however, still not ready for instrumental exam or PO diet- continues to doze intermittently. Will cont to address in treatment and assess for instrumental evaluation. Rec: cont NPO and would benefit from long term nutrition route.   -VO  --          Oral Prep Concerns    Oral Prep Concerns  increased prep time;incomplete or weak lip closure around spoon;reduced lip opening  -VO  --       Reduced Lip Opening  all consistencies  -VO  --       Incomplete or Weak Lip Closure Around Spoon  all consistencies  -VO  --       Increased Prep Time  all consistencies  -VO  --          Oral Transit Concerns    Oral Transit Concerns  increased oral transit time  -VO  --       Increased Oral Transit Time  all consistencies  -VO  --          Oral Residue Concerns    Oral Residue Concerns  lateral sulcus residue, right;other (see comments) suctioned out  -VO  --       Lateral Sulcus Residue, Right  pudding  -VO  --          Pharyngeal Phase Concerns    Pharyngeal Phase Concerns  multiple swallows  -VO  other (see comments)  -SM       Multiple Swallows  thin;pudding  -VO  --       Pharyngeal Phase Concerns, Comment  Manipulation occurred of all trials this date w/ weak multiple swallows.   -VO  Provided single ice chip. Cold not manipulate. Once melted, no swallow response initiated. Pt used lips to push saliva back out mouth.   -SM          Clinical Impression    SLP Swallowing Diagnosis  mod-severe;oral dysfunction;suspected pharyngeal dysfunction  -VO  severe;oral dysfunction;suspected pharyngeal dysfunction  -SM       Functional Impact  risk of aspiration/pneumonia  -VO  risk of aspiration/pneumonia  -SM       Rehab Potential/Prognosis, Swallowing  good, to achieve stated therapy goals  -VO  --       Swallow Criteria for Skilled Therapeutic  Interventions Met  demonstrates skilled criteria  -VO  demonstrates skilled criteria  -          Recommendations    Therapy Frequency (Swallow)  5 days per week  -VO  5 days per week  -       Predicted Duration Therapy Intervention (Days)  until discharge  -VO  until discharge  -       SLP Diet Recommendation  NPO;long term alternate methods of nutrition/hydration  -VO  NPO;long term alternate methods of nutrition/hydration PEG to be considered as NG could not be passed  -       Recommended Precautions and Strategies  other (see comments) oral care  -VO  other (see comments) oral care BID and PRN via suction toothbrush  -       SLP Rec. for Method of Medication Administration  meds via alternate route  -VO  meds via alternate route  -       Monitor for Signs of Aspiration  yes  -VO  --       Anticipated Dischage Disposition  inpatient rehabilitation facility;anticipate therapy at next level of care  -VO  --         User Key  (r) = Recorded By, (t) = Taken By, (c) = Cosigned By    Initials Name Effective Dates     Caren Wagner, MS CCC-SLP 06/22/15 -     VO Isa Acevedo MA,CCC-SLP 10/24/18 -           EDUCATION  The patient has been educated in the following areas:   Dysphagia (Swallowing Impairment) Oral Care/Hydration NPO rationale.    SLP Recommendation and Plan  SLP Swallowing Diagnosis: mod-severe, oral dysfunction, suspected pharyngeal dysfunction  SLP Diet Recommendation: NPO, long term alternate methods of nutrition/hydration  Recommended Precautions and Strategies: other (see comments)(oral care)  SLP Rec. for Method of Medication Administration: meds via alternate route     Monitor for Signs of Aspiration: yes     Swallow Criteria for Skilled Therapeutic Interventions Met: demonstrates skilled criteria  Anticipated Dischage Disposition: inpatient rehabilitation facility, anticipate therapy at next level of care  Rehab Potential/Prognosis, Swallowing: good, to achieve stated  therapy goals  Therapy Frequency (Swallow): 5 days per week  Predicted Duration Therapy Intervention (Days): until discharge       Plan of Care Reviewed With: patient, spouse    SLP GOALS     Row Name 02/02/20 1100 02/01/20 1100 01/31/20 1415       Oral Nutrition/Hydration Goal 1 (SLP)    Oral Nutrition/Hydration Goal 1, SLP  LTG: Improve swallow function until can participate in instrumental swallow study without cues  -VO  LTG: Improve swallow function until can participate in instrumental swallow study without cues  -ML  LTG: Improve swallow function until can participate in instrumental swallow study without cues  -SM    Time Frame (Oral Nutrition/Hydration Goal 1, SLP)  by discharge  -VO  by discharge  -ML  by discharge  -SM    Barriers (Oral Nutrition/Hydration Goal 1, SLP)  --  Waxing/waning  -ML  --    Progress/Outcomes (Oral Nutrition/Hydration Goal 1, SLP)  continuing progress toward goal  -VO  continuing progress toward goal  -ML  --       Oral Nutrition/Hydration Goal 2 (SLP)    Oral Nutrition/Hydration Goal 2, SLP  Tolerate SLP PO trials without s/s aspiration with 50% accuracy and cues   -VO  Tolerate SLP PO trials without s/s aspiration with 50% accuracy and cues   -ML  Tolerate SLP PO trials without s/s aspiration with 50% accuracy and cues   -SM    Time Frame (Oral Nutrition/Hydration Goal 2, SLP)  short term goal (STG)  -VO  short term goal (STG)  -ML  short term goal (STG)  -SM    Barriers (Oral Nutrition/Hydration Goal 2, SLP)  --  Accepts 1/2 ice chip-minimal to no attempt to manipulation, and no swallow reflex  -ML  --    Progress/Outcomes (Oral Nutrition/Hydration Goal 2, SLP)  continuing progress toward goal  -VO  continuing progress toward goal  -ML  --       Labial Strengthening Goal 1 (SLP)    Activity (Labial Strengthening Goal 1, SLP)  increase labial tone  -VO  increase labial tone  -ML  increase labial tone  -SM    Increase Labial Tone  labial resistance exercises  -VO  labial  resistance exercises  -ML  labial resistance exercises  -SM    [place]/Accuracy (Labial Strengthening Goal 1, SLP)  with minimal cues (75-90% accuracy)  -VO  with minimal cues (75-90% accuracy)  -ML  with minimal cues (75-90% accuracy)  -SM    Time Frame (Labial Strengthening Goal 1, SLP)  short term goal (STG)  -VO  short term goal (STG)  -ML  short term goal (STG)  -SM    Barriers (Labial Strengthening Goal 1, SLP)  --  Too fatigued for ex but no anterior loss and lips closed- no drooling  -ML  --    Progress/Outcomes (Labial Strengthening Goal 1, SLP)  continuing progress toward goal  -VO  continuing progress toward goal  -ML  --       Lingual Strengthening Goal 1 (SLP)    Activity (Lingual Strengthening Goal 1, SLP)  increase lingual tone/sensation/control/coordination/movement  -VO  increase lingual tone/sensation/control/coordination/movement  -ML  increase lingual tone/sensation/control/coordination/movement  -SM    Increase Lingual Tone/Sensation/Control/Coordination/Movement  lingual movement exercises;lingual resistance exercises  -VO  lingual movement exercises;lingual resistance exercises  -ML  lingual movement exercises;lingual resistance exercises  -SM    [place]/Accuracy (Lingual Strengthening Goal 1, SLP)  with minimal cues (75-90% accuracy)  -VO  with minimal cues (75-90% accuracy)  -ML  with minimal cues (75-90% accuracy)  -SM    Time Frame (Lingual Strengthening Goal 1, SLP)  short term goal (STG)  -VO  short term goal (STG)  -ML  short term goal (STG)  -SM    Barriers (Lingual Strengthening Goal 1, SLP)  --  Unable to complete ex- no movement of ice chip  -ML  --    Progress/Outcomes (Lingual Strengthening Goal 1, SLP)  continuing progress toward goal  -VO  progress slower than expected  -ML  --       Pharyngeal Strengthening Exercise Goal 1 (SLP)    Activity (Pharyngeal Strengthening Goal 1, SLP)  increase pharyngeal sensation;increase timing;increase anterior movement of the  hyolaryngeal complex;increase closure at entrance to airway/closure of airway at glottis;increase squeeze/positive pressure generation  -VO  increase pharyngeal sensation;increase timing;increase anterior movement of the hyolaryngeal complex;increase closure at entrance to airway/closure of airway at glottis;increase squeeze/positive pressure generation  -ML  increase pharyngeal sensation;increase timing;increase anterior movement of the hyolaryngeal complex;increase closure at entrance to airway/closure of airway at glottis;increase squeeze/positive pressure generation  -SM    Increase Pharyngeal Sensation  gustatory stimulation (sour/cold)  -VO  gustatory stimulation (sour/cold)  -ML  gustatory stimulation (sour/cold)  -SM    Increase Timing  prepping - 3 second prep or suck swallow or 3-step swallow  -VO  prepping - 3 second prep or suck swallow or 3-step swallow  -ML  prepping - 3 second prep or suck swallow or 3-step swallow  -SM    Increase Anterior Movement of the Hyolaryngeal Complex  chin tuck against resistance (CTAR)  -VO  chin tuck against resistance (CTAR)  -ML  chin tuck against resistance (CTAR)  -SM    Increase Closure at Entrance to Airway/Closure of Airway at Glottis  breath hold exercises  -VO  breath hold exercises  -ML  breath hold exercises volitional cough  -SM    Increase Squeeze/Positive Pressure Generation  hard effortful swallow  -VO  hard effortful swallow  -ML  hard effortful swallow  -SM    [place]/Accuracy (Pharyngeal Strengthening Goal 1, SLP)  with minimal cues (75-90% accuracy)  -VO  with minimal cues (75-90% accuracy)  -ML  with minimal cues (75-90% accuracy)  -SM    Time Frame (Pharyngeal Strengthening Goal 1, SLP)  short term goal (STG)  -VO  short term goal (STG)  -ML  short term goal (STG)  -SM    Barriers (Pharyngeal Strengthening Goal 1, SLP)  --  No swallow achieved on command or with ice chips- tongue pumping 1x  -ML  --    Progress/Outcomes (Pharyngeal Strengthening  Goal 1, SLP)  continuing progress toward goal  -VO  progress slower than expected  -ML  --       Comprehend Questions Goal 1 (SLP)    Improve Ability to Comprehend Questions Goal 1 (SLP)  --  simple yes/no questions;100%;with minimal cues (75-90%)  -ML  simple yes/no questions;100%;with minimal cues (75-90%)  -SM    Time Frame (Comprehend Questions Goal 1, SLP)  --  short term goal (STG)  -ML  short term goal (STG)  -SM    Progress/Outcomes (Comprehend Questions Goal 1, SLP)  --  continuing progress toward goal  -ML  --    Comment (Comprehend Questions Goal 1, SLP)  --  Thumbs up for yes- did not achieve no gesture-60%  -ML  --       Follow Directions Goal 2 (SLP)    Improve Ability to Follow Directions Goal 1 (SLP)  --  1 step direction without objects;60%;with moderate cues (50-74%)  -ML  --    Time Frame (Follow Directions Goal 1, SLP)  --  short term goal (STG);by discharge  -ML  --    Progress (Ability to Follow Directions Goal 1, SLP)  --  40%;with moderate cues (50-74%)  -ML  --    Progress/Outcomes (Follow Directions Goal 1, SLP)  --  continuing progress toward goal  -ML  --    Comment (Follow Directions Goal 1, SLP)  --  Follows some one step and/or complies with model  -ML  --       Word Retrieval Skills Goal 1 (SLP)    Improve Word Retrieval Skills By Goal 1 (SLP)  --  completing automatic speech task, counting;repeating sounds;completing open ended structured sentence;50%;with moderate cues (50-74%)  -ML  completing automatic speech task, counting;repeating sounds;completing open ended structured sentence;50%;with moderate cues (50-74%)  -SM    Time Frame (Word Retrieval Goal 1, SLP)  --  short term goal (STG)  -ML  short term goal (STG)  -SM    Progress (Word Retrieval Skills Goal 1, SLP)  --  0% No speech  -ML  --    Progress/Outcomes (Word Retrieval Goal 1, SLP)  --  progress slower than expected  -ML  --    Comment (Word Retrieval Goal 1, SLP)  --  No speech- no speech attempt  -ML  --       Motor  Planning Goal 1 (SLP)    Improve Motor Planning to Reduce Apraxia by Goal 1 (SLP)  --  imitating mouth postures;imitating vowels;following isolated oral commands;50%;with moderate cues (50-74%)  -ML  imitating mouth postures;imitating vowels;following isolated oral commands;50%;with moderate cues (50-74%)  -SM    Time Frame (Motor Planning Goal 1, SLP)  --  short term goal (STG)  -ML  short term goal (STG)  -SM    Progress (Motor Planning Goal 1, SLP)  --  0%;with maximum cues (25-49%)  -ML  --    Progress/Outcomes (Motor Planning Goal 1, SLP)  --  progress slower than expected  -ML  --    Comment (Motor Planning Goal 1, SLP)  --  Unable to imitate oral movement  -ML  --       Augmentative/Alternative Communication Objectives Goal 1 (SLP    Communication (Augmentative/Alternative Communication Goal 1, SLP)  --  improve ability to use non-verbal communication strategies  -ML  improve ability to use non-verbal communication strategies  -SM    Improve Communication by (Augmentative/Alternative Communication Goal 1, SLP)  --  use gestures to communicate;alphabet/picture board;60%;with minimal cues (75-90%)  -ML  use gestures to communicate;alphabet/picture board;60%;with minimal cues (75-90%)  -SM    Time Frame (Augmentative/Alternative Communication Goal 1, SLP)  --  short term goal (STG)  -ML  short term goal (STG)  -SM    Comment (Augmentative/Alternative Communication Goal 1, SLP)  --  Not addressed today beyond y/n gesture  -ML  --       Additional Goal 1 (SLP)    Additional Goal 1, SLP  --  --  LTG: Improve communication in order to participate in care while in hospital setting with 80% accuracy and cues  -    Time Frame (Additional Goal 1, SLP)  --  --  by discharge  -      User Key  (r) = Recorded By, (t) = Taken By, (c) = Cosigned By    Initials Name Provider Type    Caren Arias MS CCC-SLP Speech and Language Pathologist    Isa Hitchcock MA,CCC-SLP Speech and Language Pathologist     Jocelyne Michael, CCC-SLP Speech and Language Pathologist             Time Calculation:   Time Calculation- SLP     Row Name 02/02/20 1156             Time Calculation- SLP    SLP Start Time  1100  -VO      SLP Received On  02/02/20  -VO        User Key  (r) = Recorded By, (t) = Taken By, (c) = Cosigned By    Initials Name Provider Type    VO Isa Acevedo MA,CCC-SLP Speech and Language Pathologist          Therapy Charges for Today     Code Description Service Date Service Provider Modifiers Qty    [id number]  ST EVAL ORAL PHARYNG SWALLOW 2 2/2/2020 Isa Acevedo MA,CCC-SLP GN 1               Isa Acevedo MA,LISA-SLP  2/2/2020

## 2020-02-02 NOTE — PROGRESS NOTES
"NEUROSURGERY PROGRESS NOTE    Interval History:   Hospital day 5 postoperative day 3 status post left frontal craniotomy for evacuation of intraparenchymal hematoma.  Patient pulled her NG tube out overnight.  Failed swallow study, plans for PEG today or tomorrow.    Vital Signs  Blood pressure 141/80, pulse 111, temperature 100.2 °F (37.9 °C), temperature source Bladder, resp. rate 20, height 167.6 cm (66\"), weight 71.9 kg (158 lb 8.2 oz), SpO2 97 %.    Physical Exam:  Patient is resting in bed no apparent distress  She opens her eyes to voice.  She is globally aphasic  Right upper and lower extremity flaccid   Follows commands in left upper and lower extremity.   Responds to questions appropriately with thumbs up     Results Review:    I/O last 3 completed shifts:  In: 4031.2 [I.V.:2946; Other:284; NG/GT:741; IV Piggyback:60.2]  Out: 5485 [Urine:5485]  No intake/output data recorded.      Assessment/Plan:   Status post left frontal craniotomy for evacuation of intraparenchymal hematoma  Plans for PEG placement today or tomorrow  Patient may be transferred to medical floor from neurosurgical standpoint when medically ready  She will certainly need rehab placement at discharge    Kathy Del Real PA-C  02/02/20  8:47 AM    "

## 2020-02-02 NOTE — CONSULTS
Patient Name:  Yee Goodwin  YOB: 1970  [id number]       Patient Care Team:  Ozzie Lucero MD as PCP - General (Internal Medicine)      General Surgery Consult Note     Date of Consultation: 02/02/20    Consulting Physician - Trino Mobley*    Reason for Consult - Feeding difficulty     Subjective     I have been asked to see  Yee Goodwin , a 49 y.o. female in consultation for feeding difficulty.  She was admitted on 1/28/2020 for an acute left frontal intracerebral hemorrhage and associated hypertensive emergency.  She required intubation and underwent left frontal craniotomy and evacuation on 1/30/2020.  She has begun to arouse and remains hemiparetic on the right. She remains on intermittent Cardene drip because of no oral or NG access for her hypertension.  She is unable to communicate verbally.    Due to her illness, she is unable to participate in her history, so all history is obtained from the hospital chart. We have been asked to see her for long term  feeding access.        Allergy:   Allergies   Allergen Reactions   • Venlafaxine Rash       Medications:    hydroCHLOROthiazide Oral 12.5 mg Nasogastric Daily   ipratropium-albuterol 3 mL Nebulization 4x Daily - RT   Lidocaine HCl Urethral/Mucosal 2%  Topical Once   lisinopril 20 mg Oral Daily   oxymetazoline 2 spray Each Nare Once   pantoprazole 40 mg Intravenous Q24H   sodium chloride 10 mL Intravenous Q12H       niCARdipine 5-15 mg/hr Last Rate: 10 mg/hr (02/02/20 0813)     No current facility-administered medications on file prior to encounter.      Current Outpatient Medications on File Prior to Encounter   Medication Sig   • lisinopril (PRINIVIL,ZESTRIL) 20 MG tablet Take 20 mg by mouth Daily.   • omeprazole (priLOSEC) 20 MG capsule TAKE ONE CAPSULE BY MOUTH EVERY DAY (Patient taking differently: Take 40 mg by mouth.)   • benzonatate (TESSALON PERLES) 100 MG capsule Take 100 mg by mouth 3 (Three) Times a Day As Needed  "for Cough.   • cyclobenzaprine (FLEXERIL) 10 MG tablet Take 1 tablet by mouth 2 (Two) Times a Day As Needed for Muscle Spasms.   • hydrochlorothiazide (MICROZIDE) 12.5 MG capsule Take 1 capsule by mouth Daily.   • lidocaine (LIDODERM) 5 % Place 1 patch on the skin Daily. Remove & Discard patch within 12 hours or as directed by MD   • varenicline (CHANTIX CONTINUING MONTH PAK) 1 MG tablet Take 1 tablet by mouth 2 (Two) Times a Day.   • varenicline (CHANTIX STARTING MONTH PAK) 0.5 MG X 11 & 1 MG X 42 tablet Take 0.5 mg one daily on days 1-3 and and 0.5 mg twice daily on days 4-7.Then 1 mg twice daily for a total of 12 weeks.       PMHx:   Past Medical History:   Diagnosis Date   • Arthritis    • Diverticulitis    • GERD (gastroesophageal reflux disease)    • Hypertension        Past Surgical History:  Past Surgical History:   Procedure Laterality Date   • CHOLECYSTECTOMY     • COLONOSCOPY  2015   • CRANIOTOMY FOR SUBDURAL HEMATOMA Left 1/30/2020    Procedure: FRONTAL CRANIOTOMY FOR EVACUATION OF INTRAPARENCHYMAL HEMATOMA  LEFT;  Surgeon: Jsoe Cole MD;  Location: [facility];  Service: Neurosurgery   • HAND TENDON SURGERY Right     patient hand right thumb tendon surgery   • HIP SURGERY     • JOINT REPLACEMENT     • TUBAL ABDOMINAL LIGATION           Family History: Noncontributory     Social History:    Tobacco use: 1/2 ppd    EtOH use : Occasional   Illicit drug use: None      Review of Systems   Review of systems could not be obtained due to   patient nonverbal.            Objective     Physical Exam:      Vital Signs  /84   Pulse 115   Temp 100.4 °F (38 °C)   Resp 20   Ht 167.6 cm (66\")   Wt 71.9 kg (158 lb 8.2 oz)   LMP  (LMP Unknown)   SpO2 98%   BMI 25.58 kg/m²     Intake/Output Summary (Last 24 hours) at 2/2/2020 1219  Last data filed at 2/2/2020 0600  Gross per 24 hour   Intake 2442.72 ml   Output 2625 ml   Net -182.28 ml         Physical Exam:    Head: Normocephalic, atraumatic. "   Eyes: Pupils equal, round, react to light and accommodation.   Mouth: Oral mucosa without lesions,    Neck: No masses, lymphadenopathy or carotid bruits bilaterally   CV: Rhythm  and rate regular , no  murmurs, rubs or gallops  Lungs: Clear  to auscultation bilaterally   Abdomen: Bowel sounds positive  , soft, nontender  Groin : No obvious hernias bilaterally   Extremities:  No cyanosis, clubbing or edema bilaterally   Lymphatics: No abnormal lymphadenopathy appreciated         Results Review: I have personally reviewed all of the recent lab and imaging results available at this time.  White blood cell count 17,000, hemoglobin 13, platelet count 391.  KUB reveals an NG tube in the stomach and normal anatomic position.       Assessment/Plan     Assessment and Plan:      Feeding Difficulty - We will plan for PEG tomorrow . The risks and benefits were discussed with the family, and they agreed to proceed.    Hospital Problem List     * (Principal) Acute L frontal ICH 1/28/20    JAYESH (obstructive sleep apnea)    Tobacco abuse    Hypertensive emergency        Elevated transaminase levels    Dyslipidemia    Acute hypoxemic/hypercarbic respiratory failure due to ICH. Required Ventilatory support    S/P L frontal craniotomy and evacuation of intraparenchymal hematoma 1/30/20                I discussed the patient's findings and my recommendations with the patient and/or family, as well as the primary team     Andrew Young MD  02/02/20  12:19 PM        Please note that portions of this note were completed with a voice recognition program. Efforts were made to edit the dictations, but occasionally words are mistranscribed.

## 2020-02-02 NOTE — PLAN OF CARE
Problem: Patient Care Overview  Goal: Plan of Care Review  Outcome: Ongoing (interventions implemented as appropriate)  Flowsheets (Taken 2/2/2020 2430)  Progress: no change  Plan of Care Reviewed With: patient; significant other  Outcome Summary:   *Pt removed NG approx 0600- attempted to replace but was unsuccessful. AM meds unable to be given. PT NIHSS 26 at shift change. PT continues to have no movement in RUE/RLE- did withdraw to pain in RLE at 0400. TF increased throughout night per order- currently at 45 ml/hr. Pt remained tachy all night- HR in 130's at times. Pt able to give a thumbs up appropriately but remains globally aphasic. PT temperature ranged from 99- 100.4 througohut shift- PRN Tylenol given a 0400. Pt restless at times. Will continue to monitor.     Problem: Skin Injury Risk (Adult)  Goal: Skin Health and Integrity  Outcome: Ongoing (interventions implemented as appropriate)     Problem: Stroke (Hemorrhagic) (Adult)  Goal: Signs and Symptoms of Listed Potential Problems Will be Absent, Minimized or Managed (Stroke)  Outcome: Ongoing (interventions implemented as appropriate)

## 2020-02-03 LAB
ALBUMIN SERPL-MCNC: 3.6 G/DL (ref 3.5–5.2)
ALP SERPL-CCNC: 117 U/L (ref 39–117)
ALT SERPL W P-5'-P-CCNC: 94 U/L (ref 1–33)
ANION GAP SERPL CALCULATED.3IONS-SCNC: 17 MMOL/L (ref 5–15)
APTT PPP: 25 SECONDS (ref 24–37)
AST SERPL-CCNC: 34 U/L (ref 1–32)
BILIRUB SERPL-MCNC: 0.6 MG/DL (ref 0.2–1.2)
BUN BLD-MCNC: 12 MG/DL (ref 6–20)
CALCIUM SPEC-SCNC: 9.1 MG/DL (ref 8.6–10.5)
CHLORIDE SERPL-SCNC: 86 MMOL/L (ref 98–107)
CHOLEST SERPL-MCNC: 216 MG/DL (ref 0–200)
CO2 SERPL-SCNC: 22 MMOL/L (ref 22–29)
CREAT BLD-MCNC: 0.32 MG/DL (ref 0.57–1)
DEPRECATED RDW RBC AUTO: 40.6 FL (ref 37–54)
ERYTHROCYTE [DISTWIDTH] IN BLOOD BY AUTOMATED COUNT: 12.1 % (ref 12.3–15.4)
GLUCOSE BLD-MCNC: 111 MG/DL (ref 65–99)
GLUCOSE BLDC GLUCOMTR-MCNC: 107 MG/DL (ref 70–130)
GLUCOSE BLDC GLUCOMTR-MCNC: 112 MG/DL (ref 70–130)
GLUCOSE BLDC GLUCOMTR-MCNC: 119 MG/DL (ref 70–130)
GLUCOSE BLDC GLUCOMTR-MCNC: 119 MG/DL (ref 70–130)
HCT VFR BLD AUTO: 35.9 % (ref 34–46.6)
HGB BLD-MCNC: 12.4 G/DL (ref 12–15.9)
INR PPP: 1 (ref 0.85–1.16)
MAGNESIUM SERPL-MCNC: 2.3 MG/DL (ref 1.6–2.6)
MCH RBC QN AUTO: 31.7 PG (ref 26.6–33)
MCHC RBC AUTO-ENTMCNC: 34.5 G/DL (ref 31.5–35.7)
MCV RBC AUTO: 91.8 FL (ref 79–97)
PHOSPHATE SERPL-MCNC: 2.9 MG/DL (ref 2.5–4.5)
PLATELET # BLD AUTO: 346 10*3/MM3 (ref 140–450)
PMV BLD AUTO: 10.1 FL (ref 6–12)
POTASSIUM BLD-SCNC: 3.8 MMOL/L (ref 3.5–5.2)
PREALB SERPL-MCNC: 22.5 MG/DL (ref 20–40)
PROT SERPL-MCNC: 7.1 G/DL (ref 6–8.5)
PROTHROMBIN TIME: 12.7 SECONDS (ref 11.2–14.3)
RBC # BLD AUTO: 3.91 10*6/MM3 (ref 3.77–5.28)
SODIUM BLD-SCNC: 125 MMOL/L (ref 136–145)
SODIUM BLD-SCNC: 128 MMOL/L (ref 136–145)
TRIGL SERPL-MCNC: 108 MG/DL (ref 0–150)
WBC NRBC COR # BLD: 16.9 10*3/MM3 (ref 3.4–10.8)

## 2020-02-03 PROCEDURE — 84478 ASSAY OF TRIGLYCERIDES: CPT | Performed by: INTERNAL MEDICINE

## 2020-02-03 PROCEDURE — 94799 UNLISTED PULMONARY SVC/PX: CPT

## 2020-02-03 PROCEDURE — 97530 THERAPEUTIC ACTIVITIES: CPT

## 2020-02-03 PROCEDURE — 80053 COMPREHEN METABOLIC PANEL: CPT | Performed by: INTERNAL MEDICINE

## 2020-02-03 PROCEDURE — 97110 THERAPEUTIC EXERCISES: CPT

## 2020-02-03 PROCEDURE — 99024 POSTOP FOLLOW-UP VISIT: CPT | Performed by: PHYSICIAN ASSISTANT

## 2020-02-03 PROCEDURE — 25010000002 HYDROMORPHONE PER 4 MG: Performed by: PHYSICIAN ASSISTANT

## 2020-02-03 PROCEDURE — 85027 COMPLETE CBC AUTOMATED: CPT | Performed by: SURGERY

## 2020-02-03 PROCEDURE — 84295 ASSAY OF SERUM SODIUM: CPT | Performed by: INTERNAL MEDICINE

## 2020-02-03 PROCEDURE — 99233 SBSQ HOSP IP/OBS HIGH 50: CPT | Performed by: INTERNAL MEDICINE

## 2020-02-03 PROCEDURE — 85610 PROTHROMBIN TIME: CPT | Performed by: SURGERY

## 2020-02-03 PROCEDURE — 25010000002 MIDAZOLAM PER 1 MG: Performed by: SURGERY

## 2020-02-03 PROCEDURE — 84100 ASSAY OF PHOSPHORUS: CPT | Performed by: INTERNAL MEDICINE

## 2020-02-03 PROCEDURE — 85730 THROMBOPLASTIN TIME PARTIAL: CPT | Performed by: SURGERY

## 2020-02-03 PROCEDURE — 82465 ASSAY BLD/SERUM CHOLESTEROL: CPT | Performed by: INTERNAL MEDICINE

## 2020-02-03 PROCEDURE — 0DH63UZ INSERTION OF FEEDING DEVICE INTO STOMACH, PERCUTANEOUS APPROACH: ICD-10-PCS | Performed by: SURGERY

## 2020-02-03 PROCEDURE — 84134 ASSAY OF PREALBUMIN: CPT | Performed by: INTERNAL MEDICINE

## 2020-02-03 PROCEDURE — 82962 GLUCOSE BLOOD TEST: CPT

## 2020-02-03 PROCEDURE — 25010000002 FENTANYL CITRATE (PF) 100 MCG/2ML SOLUTION: Performed by: SURGERY

## 2020-02-03 PROCEDURE — 83735 ASSAY OF MAGNESIUM: CPT | Performed by: INTERNAL MEDICINE

## 2020-02-03 PROCEDURE — 25010000003 CEFAZOLIN IN DEXTROSE 2-4 GM/100ML-% SOLUTION: Performed by: SURGERY

## 2020-02-03 PROCEDURE — 25010000003 CEFAZOLIN 1-4 GM/50ML-% SOLUTION: Performed by: SURGERY

## 2020-02-03 RX ORDER — CARVEDILOL 12.5 MG/1
12.5 TABLET ORAL 2 TIMES DAILY WITH MEALS
Status: DISCONTINUED | OUTPATIENT
Start: 2020-02-03 | End: 2020-02-04

## 2020-02-03 RX ORDER — MIDAZOLAM HYDROCHLORIDE 1 MG/ML
3 INJECTION INTRAMUSCULAR; INTRAVENOUS ONCE
Status: COMPLETED | OUTPATIENT
Start: 2020-02-03 | End: 2020-02-03

## 2020-02-03 RX ORDER — MIDAZOLAM HYDROCHLORIDE 1 MG/ML
5 INJECTION INTRAMUSCULAR; INTRAVENOUS ONCE
Status: DISCONTINUED | OUTPATIENT
Start: 2020-02-03 | End: 2020-02-03

## 2020-02-03 RX ORDER — FENTANYL CITRATE 50 UG/ML
200 INJECTION, SOLUTION INTRAMUSCULAR; INTRAVENOUS ONCE
Status: DISCONTINUED | OUTPATIENT
Start: 2020-02-03 | End: 2020-02-03

## 2020-02-03 RX ORDER — FENTANYL CITRATE 50 UG/ML
75 INJECTION, SOLUTION INTRAMUSCULAR; INTRAVENOUS ONCE
Status: COMPLETED | OUTPATIENT
Start: 2020-02-03 | End: 2020-02-03

## 2020-02-03 RX ADMIN — CARVEDILOL 12.5 MG: 12.5 TABLET, FILM COATED ORAL at 10:42

## 2020-02-03 RX ADMIN — FENTANYL CITRATE 75 MCG: 0.05 INJECTION, SOLUTION INTRAMUSCULAR; INTRAVENOUS at 07:50

## 2020-02-03 RX ADMIN — CARVEDILOL 12.5 MG: 12.5 TABLET, FILM COATED ORAL at 18:22

## 2020-02-03 RX ADMIN — IPRATROPIUM BROMIDE AND ALBUTEROL SULFATE 3 ML: 2.5; .5 SOLUTION RESPIRATORY (INHALATION) at 08:30

## 2020-02-03 RX ADMIN — CEFAZOLIN SODIUM 1 G: 1 INJECTION, SOLUTION INTRAVENOUS at 22:19

## 2020-02-03 RX ADMIN — LISINOPRIL 20 MG: 20 TABLET ORAL at 10:42

## 2020-02-03 RX ADMIN — SODIUM CHLORIDE 15 MG/HR: 9 INJECTION, SOLUTION INTRAVENOUS at 04:58

## 2020-02-03 RX ADMIN — SODIUM CHLORIDE 15 MG/HR: 9 INJECTION, SOLUTION INTRAVENOUS at 02:11

## 2020-02-03 RX ADMIN — HYDROMORPHONE HYDROCHLORIDE 0.25 MG: 1 INJECTION, SOLUTION INTRAMUSCULAR; INTRAVENOUS; SUBCUTANEOUS at 05:58

## 2020-02-03 RX ADMIN — CEFAZOLIN SODIUM 2 G: 2 INJECTION, SOLUTION INTRAVENOUS at 07:13

## 2020-02-03 RX ADMIN — IPRATROPIUM BROMIDE AND ALBUTEROL SULFATE 3 ML: 2.5; .5 SOLUTION RESPIRATORY (INHALATION) at 17:08

## 2020-02-03 RX ADMIN — HYDROCHLOROTHIAZIDE 25 MG: 25 TABLET ORAL at 06:25

## 2020-02-03 RX ADMIN — PANTOPRAZOLE SODIUM 40 MG: 40 INJECTION, POWDER, FOR SOLUTION INTRAVENOUS at 10:43

## 2020-02-03 RX ADMIN — IPRATROPIUM BROMIDE AND ALBUTEROL SULFATE 3 ML: 2.5; .5 SOLUTION RESPIRATORY (INHALATION) at 20:31

## 2020-02-03 RX ADMIN — SODIUM CHLORIDE 7.5 MG/HR: 9 INJECTION, SOLUTION INTRAVENOUS at 10:44

## 2020-02-03 RX ADMIN — SODIUM CHLORIDE 12.5 MG/HR: 9 INJECTION, SOLUTION INTRAVENOUS at 05:53

## 2020-02-03 RX ADMIN — CEFAZOLIN SODIUM 1 G: 1 INJECTION, SOLUTION INTRAVENOUS at 14:28

## 2020-02-03 RX ADMIN — MIDAZOLAM 3 MG: 1 INJECTION INTRAMUSCULAR; INTRAVENOUS at 07:50

## 2020-02-03 RX ADMIN — POTASSIUM CHLORIDE 30 MEQ: 1.5 POWDER, FOR SOLUTION ORAL at 10:42

## 2020-02-03 RX ADMIN — SODIUM CHLORIDE, PRESERVATIVE FREE 10 ML: 5 INJECTION INTRAVENOUS at 22:19

## 2020-02-03 RX ADMIN — ACETAMINOPHEN 650 MG: 325 TABLET, FILM COATED ORAL at 10:42

## 2020-02-03 NOTE — PROGRESS NOTES
"Patient Name:  Yee Goodwin  YOB: 1970  [id number]    Surgery Post - Operative Note    Date of visit: 2/3/2020    Subjective   Subjective: Stable since PEG       Objective     Objective:    /70   Pulse 80   Temp 97.5 °F (36.4 °C) (Axillary)   Resp 18   Ht 167.6 cm (66\")   Wt 67.8 kg (149 lb 7.6 oz)   LMP  (LMP Unknown)   SpO2 99%   BMI 24.13 kg/m²     CV:  Rate  regular and rhythm  regular  L:  Rhonchi  to auscultation bilaterally   ABD:  Soft, appropriately tender. PEG clean, dry and intact   EXT:  No cyanosis, clubbing or edema         Assessment/Plan     Assessment/ Plan: Doing well after PEG. Continue Pulmonary toilet    Hospital Problem List     * (Principal) Acute L frontal ICH 1/28/20    JAYESH (obstructive sleep apnea)    Tobacco abuse    Hypertensive emergency        Elevated transaminase levels    Dyslipidemia    Acute hypoxemic/hypercarbic respiratory failure due to ICH. Required Ventilatory support    S/P L frontal craniotomy and evacuation of intraparenchymal hematoma 1/30/20              Andrew Young MD  2/3/2020  4:33 PM    "

## 2020-02-03 NOTE — PATIENT CARE CONFERENCE
ICU Rounds: Cont OT/PT per treatment plan. Pt s/p PEG this AM, check in PM for OT/PT participation.

## 2020-02-03 NOTE — PROGRESS NOTES
INTENSIVIST / PULMONARY FOLLOW UP NOTE     Hospital:  LOS: 6 days   Ms. Yee Goodwin, 49 y.o. female is followed for:     Acute L frontal ICH 1/28/20    JAYESH (obstructive sleep apnea)    Tobacco abuse    Hypertensive emergency    Elevated transaminase levels    Dyslipidemia    Acute hypoxemic/hypercarbic respiratory failure due to ICH. Required Ventilatory support    S/P L frontal craniotomy and evacuation of intraparenchymal hematoma 1/30/20          SUBJECTIVE   Had PEG    The patient's relevant past medical, surgical, family, and social history were reviewed    Allergies and medications were reviewed    ROS:  Per subjective, all other systems were reviewed and were negative        OBJECTIVE     Vital Sign Min/Max for last 24 hours:  Temp  Min: 97.5 °F (36.4 °C)  Max: 100.5 °F (38.1 °C)   BP  Min: 97/71  Max: 151/95   Pulse  Min: 76  Max: 120   Resp  Min: 14  Max: 21   SpO2  Min: 91 %  Max: 100 %   No data recorded     Physical Exam:  General Appearance:  Somnolent but rousable  Eyes:  No scleral icterus or pallor, pupils normal  Ears, Nose, Mouth, Throat:  Atraumatic, oropharynx clear  Neck:  Trachea midline, thyroid normal  Respiratory:  Clear to auscultation bilaterally, normal effort, no tenderness to palpation  Cardiovascular:  Regular rate and rhythm, no murmurs, no peripheral edema, no thrill  Gastrointestinal:  Soft, non-tender, non-distended, no hepatosplenomegaly  Skin:  Normal temperature, no rash  Psychiatric:  No agitation  Neuro:  Interval: (shift change)  1a. Level of Consciousness: 1-->Not alert, but arousable by minor stimulation to obey, answer, or respond  1b. LOC Questions: 2-->Answers neither question correctly  1c. LOC Commands: 0-->Performs both tasks correctly  2. Best Gaze: 1-->Partial gaze palsy, gaze is abnormal in one or both eyes, but forced deviation or total gaze paresis is not present  3. Visual: 1-->Partial hemianopia  4. Facial Palsy: 2-->Partial paralysis (total or near-total  paralysis of lower face)  5a. Motor Arm, Left: 0-->No drift, limb holds 90 (or 45) degrees for full 10 secs  5b. Motor Arm, Right: 4-->No movement  6a. Motor Leg, Left: 0-->No drift, leg holds 30 degree position for full 5 secs  6b. Motor Leg, Right: 4-->No movement  7. Limb Ataxia: 0-->Absent  8. Sensory: 2-->Severe to total sensory loss, patient is not aware of being touched in the face, arm, and leg  9. Best Language: 3-->Mute, global aphasia, no usable speech or auditory comprehension  10. Dysarthria: 2-->Severe dysarthria, patients speech is so slurred as to be unintelligible in the absence of or out of proportion to any dysphasia, or is mute/anarthric  11. Extinction and Inattention (formerly Neglect): 1-->Visual, tactile, auditory, spatial, or personal inattention or extinction to bilateral simultaneous stimulation in one of the sensory modalities    Total (NIH Stroke Scale): 23      Telemetry:              Hemodynamics:   CVP:     PAP:     PAOP:     CO:     CI:     SVI:     SVR:       SpO2: 99 % SpO2  Min: 91 %  Max: 100 %   Device:      Flow Rate:   No data recorded     Mechanical Ventilator Settings:       Vt (Set, L): 0.44 L    Resp Rate (Set): 14 Resp Rate (Observed) Vent: 29   FiO2 (%): 30 %    PEEP/CPAP (cm H2O): 5 cm H20 Plateau Pressure (cm H2O): 17 cm H2O    Minute Ventilation (L/min) (Obs): 1.62 L/min    I:E Ratio (Obs): 15.0:1     Intake/Ouptut 24 hrs (7:00AM - 6:59 AM)  Intake & Output (last 3 days)       01/31 0701 - 02/01 0700 02/01 0701 - 02/02 0700 02/02 0701 - 02/03 0700 02/03 0701 - 02/04 0700    I.V. (mL/kg) 2387 (35.4) 2286 (31.8) 1912.6 (28.2) 518.7 (7.7)    Other 71 213 20     NG/ 558 168     IV Piggyback 241 60.2 50     Total Intake(mL/kg) 2882 (42.7) 3117.2 (43.4) 2150.6 (31.7) 518.7 (7.7)    Urine (mL/kg/hr) 4020 (2.5) 3510 (2) 1995 (1.2) 1400 (3.1)    Total Output 4020 3510 1995 1400    Net -1138 -392.8 +155.6 -881.3                  Lines, Drains & Airways    Active LDAs      Name:   Placement date:   Placement time:   Site:   Days:    Peripheral IV 02/01/20 1200 Anterior;Right;Upper Arm   02/01/20    1200    Arm   2    Peripheral IV 02/01/20 1215 Anterior;Right;Upper Arm   02/01/20    1215    Arm   2    Gastrostomy/Enterostomy Percutaneous endoscopic gastrostomy (PEG) LUQ   02/03/20    0750    LUQ   less than 1    External Urinary Catheter   02/02/20    2202    --   less than 1                Hematology:  Results from last 7 days   Lab Units 02/03/20  0418 02/02/20  0522 02/01/20  0354 01/31/20  0444 01/30/20  0439 01/29/20  0644 01/28/20  1720   WBC 10*3/mm3 16.90* 17.75* 17.15* 13.83* 12.66* 14.41* 9.04   HEMOGLOBIN g/dL 12.4 13.0 12.3 11.3* 11.9* 12.7 13.3   HEMATOCRIT % 35.9 38.9 38.5 34.3 36.2 39.3 40.8   PLATELETS 10*3/mm3 346 391 372 267 260 327 309     Electrolytes, Magnesium and Phosphorus:  Results from last 7 days   Lab Units 02/03/20  0418 02/02/20  0522 02/01/20  0354 02/01/20  0001 01/31/20 0444 01/30/20  1913 01/30/20  0738 01/30/20  0439  01/29/20  0644 01/28/20  1720   SODIUM mmol/L 125* 127* 131*  --  142  --   --  131*  --  133* 141   CHLORIDE mmol/L 86* 88* 93*  --  103  --   --  93*  --  97* 100   POTASSIUM mmol/L 3.8 3.8 3.4* 3.5 3.6 4.0  --  3.3*   < > 4.3 3.3*   CO2 mmol/L 22.0 25.0 24.0  --  26.0  --   --  25.0  --  22.0 25.0   MAGNESIUM mg/dL 2.3  --  2.2  --  2.9*  --   --  2.4  --  2.6 1.7   PHOSPHORUS mg/dL 2.9 2.7 2.4* 2.2* 1.5* 1.5* 2.0* 2.2*   < > 1.8*  --     < > = values in this interval not displayed.     Renal:  Results from last 7 days   Lab Units 02/03/20 0418 02/02/20  0522 02/01/20 0354 01/31/20  0444 01/30/20  0439 01/29/20  0644 01/28/20  1720   CREATININE mg/dL 0.32* 0.32* 0.35* 0.43* 0.40* 0.49* 0.60   BUN mg/dL 12 13 10 15 9 11 15     Estimated Creatinine Clearance: 222.6 mL/min (A) (by C-G formula based on SCr of 0.32 mg/dL (L)).  Hepatic:  Results from last 7 days   Lab Units 02/03/20 0418 02/01/20 0354 01/31/20 0444 01/29/20  0644  01/28/20  1720   ALK PHOS U/L 117 120* 98 108 111   BILIRUBIN mg/dL 0.6 0.6 0.4 0.5 0.2   ALT (SGPT) U/L 94* 174* 153* 152* 139*   AST (SGOT) U/L 34* 74* 92* 126* 120*     Arterial Blood Gases:  Results from last 7 days   Lab Units 01/31/20  0957 01/31/20  0446 01/30/20  0338 01/29/20  0343 01/28/20  1858 01/28/20  1717   PH, ARTERIAL pH units 7.506* 7.465* 7.476* 7.436 7.371 7.34*   PCO2, ARTERIAL mm Hg 36.3 40.8 37.2 39.7 49.6*  --    PO2 ART mm Hg 63.1* 67.4* 88.8 83.7 96.6  --    FIO2 % 35 30 40 40 60  --        Results from last 7 days   Lab Units 01/29/20  0644   HEMOGLOBIN A1C % 5.90*       No results found for: LACTATE    Relevant imaging studies and labs from 02/03/20 were reviewed and interpreted by me    Medications (drips):    niCARdipine Last Rate: Stopped (02/03/20 1200)         carvedilol 12.5 mg Oral BID With Meals   ceFAZolin 1 g Intravenous Q8H   ipratropium-albuterol 3 mL Nebulization 4x Daily - RT   lisinopril 20 mg Oral Daily   pantoprazole 40 mg Intravenous Q24H   potassium chloride 30 mEq Nasogastric Daily   sodium chloride 10 mL Intravenous Q12H       Assessment/Plan   IMPRESSION / PLAN     Inpatient Problem List:  49 y.o.female:  Active Hospital Problems    Diagnosis   • **Acute L frontal ICH 1/28/20   • S/P L frontal craniotomy and evacuation of intraparenchymal hematoma 1/30/20   • Hypertensive emergency   • Elevated transaminase levels   • Dyslipidemia   • Acute hypoxemic/hypercarbic respiratory failure due to ICH. Required Ventilatory support   • JAYESH (obstructive sleep apnea)   • Tobacco abuse        Impression:  49 y.o.female with relevant PMH of HTN, HLD, JAYESH, Tobacco abuse admitted 1/28/2020 with left frontal ICH and hypertensive emergency.  NIH was 25 and volume of hemorrhage was 43 cc with surrounding edema and associated subarachnoid hemorrhage creating mass-effect on the left lateral ventricle with rightward shift of 7 mm.  CTA revealed atherosclerosis of bilateral carotid  bifurcations with 80% right and 75% left luminal narrowing as well as moderate to severe stenoses and irregularities throughout the MCA distribution bilaterally.  No vascular malformations / aneurysms however were noted.  Patient required intubation for airway protection.  Underwent left frontal craniotomy and evacuation of intraparenchymal hematoma on 1/30/20.  Had PEG placed 2/3/20.    Plan:  ICH - plan per Neurosurgery, clarify BP and Sodium goal.  Check sodium again this afternoon.  D/c free water.      HTN - add Coreg, wean off cardene as tolerated    Urinary Retention - intermittent catheterization for now    Nutrition - Tube feeds    Plan of care and goals reviewed with mulitdisciplinary team at daily rounds         Trino Mobley MD  Intensive Care Medicine  02/03/20 1:34 PM

## 2020-02-03 NOTE — PROGRESS NOTES
Intensivist Note     2/2/2020  Hospital Day: 5  3 Days Post-Op  ICU Stays Timeline      Dates and times are displayed in the time zone of the admission          Hospital Admission: 01/28/20 1707 - Current  ICU stays: 1      In Date/Time Event Department ICU Stay Duration     01/28/20 1707 Admission [facility] EMERGENCY DEPT      01/28/20 2114 Transfer In  [facility] 2B ICU 4 days 22 hours 51 minutes     01/29/20 0813 Transfer In [facility] CATH LAB      01/29/20 0916 Transfer In [facility] 2B ICU      01/30/20 0954 Transfer In [facility] OR      01/30/20 1123 Transfer In [facility] 2B ICU                 Ms. Yee Goodwin, 49 y.o. female is followed for:    Acute L frontal ICH 1/28/20    S/P L frontal craniotomy and evacuation of intraparenchymal hematoma 1/30/20    Hypertensive emergency    Acute hypoxemic/hypercarbic respiratory failure due to ICH. Required Ventilatory support    JAYESH (obstructive sleep apnea)    Tobacco abuse    Elevated transaminase levels    Dyslipidemia       SUBJECTIVE     49-year-old white female admitted 1/28/2020 for acute left frontal ICH in association with hypertensive emergency.  NIH was 25 and volume of hemorrhage was 43 cc with surrounding edema and associated subarachnoid hemorrhage creating mass-effect on the left lateral ventricle with rightward shift of 7 mm.  CTA revealed atherosclerosis of bilateral carotid bifurcations with 80% right and 75% left luminal narrowing as well as moderate to severe stenoses and irregularities throughout the MCA distribution bilaterally.  No vascular malformations/aneurysms however were noted.  Because of neurologic decline patient required intubation and ventilatory support and blood pressure control, then underwent left frontal craniotomy and evacuation of intraparenchymal hematoma 1/30/2020.  Postop CT scan showed significant improvement with less mass-effect.  Patient began to wake up and follow commands on the left, although as expected remained hemiparetic on the  "right.  Gas exchange and respiratory mechanics were adequate so was extubated 1/31/2020.        Interval history: Gas exchange excellent today with saturations of 90% on 2 L and normal work of breathing.  She appears alert and can follow commands with her left arm and leg but still with severe expressive aphasia and right hemiparesis.  Failed speech evaluation 2/1/2020 so nasoduodenal tube was placed and enteral feeds were begun.  Unfortunately she pulled her nasoduodenal tube last night and is now off tube feedings.  Attempts to replace it have been unsuccessful because of difficulty passing it through the nares.  Patient remains on Cardene because her SBP is persistently elevated and now that NG tube is out she is again off oral antihypertensives which were resumed yesterday.  T-max is 100.4 and WBC remains elevated at 17.75    ROS: Per subjective, all other systems reviewed and were negative.    The patient's relevant PMH, PSH, FH, and SH were reviewed and updated in Epic as appropriate. Allergies and Medications reviewed.    OBJECTIVE     /84   Pulse 103   Temp 99.9 °F (37.7 °C)   Resp 20   Ht 167.6 cm (66\")   Wt 71.9 kg (158 lb 8.2 oz)   LMP  (LMP Unknown)   SpO2 95%   BMI 25.58 kg/m²   Oxygen Concentration (%): 30  Flow (L/min): 2    Flowsheet Rows      First Filed Value   Admission Height  167.6 cm (66\") Documented at 01/28/2020 1754   Admission Weight  72.6 kg (160 lb) Documented at 01/28/2020 1754        Intake & Output (last day)       02/02 0701 - 02/03 0700    I.V. (mL/kg) 690.2 (9.6)    Other     NG/GT     IV Piggyback     Total Intake(mL/kg) 690.2 (9.6)    Urine (mL/kg/hr) 960 (1)    Total Output 960    Net -269.8               Exam:  General Exam:  Well-developed middle-aged white female propped up in bed in NAD  HEENT: Pupils equal and reactive.  NG tube being replaced  Neck:                          Supple, no JVD, thyromegaly, or adenopathy  Lungs: Clear anteriorly and " laterally  Cardiovascular: RRR without murmurs or gallops.   bpm  Abdomen: Soft nontender without organomegaly or masses.   and rectal: Deferred.  Extremities: No cyanosis clubbing edema.  Neurologic:                 Right hemiparesis with some mild right-sided facial weakness.  Is a phasic but does moan at times.  Tracks me with her eyes and will follow commands to squeeze and move left arm and leg.    Chest X-Ray: Clear without evidence of consolidation, pleural effusions, or masses.  Heart size is normal without evidence of decompensation.    INFUSIONS    niCARdipine 5-15 mg/hr Last Rate: 15 mg/hr (02/02/20 1814)       Results from last 7 days   Lab Units 02/02/20  0522 02/01/20  0354 01/31/20  0444   WBC 10*3/mm3 17.75* 17.15* 13.83*   HEMOGLOBIN g/dL 13.0 12.3 11.3*   HEMATOCRIT % 38.9 38.5 34.3   PLATELETS 10*3/mm3 391 372 267     Results from last 7 days   Lab Units 02/02/20  0522 02/01/20  0354   SODIUM mmol/L 127* 131*   POTASSIUM mmol/L 3.8 3.4*   CHLORIDE mmol/L 88* 93*   CO2 mmol/L 25.0 24.0   BUN mg/dL 13 10   CREATININE mg/dL 0.32* 0.35*   GLUCOSE mg/dL 143* 113*   CALCIUM mg/dL 9.4 9.2     Results from last 7 days   Lab Units 02/02/20  0522 02/01/20  0354 02/01/20  0001 01/31/20 0444 01/30/20  0439   MAGNESIUM mg/dL  --  2.2  --  2.9*  --  2.4   PHOSPHORUS mg/dL 2.7 2.4* 2.2* 1.5*   < > 2.2*    < > = values in this interval not displayed.     Results from last 7 days   Lab Units 02/01/20  0354 01/31/20  0444 01/29/20  0644   ALK PHOS U/L 120* 98 108   BILIRUBIN mg/dL 0.6 0.4 0.5   ALT (SGPT) U/L 174* 153* 152*   AST (SGOT) U/L 74* 92* 126*       No results found for: SEDRATE  No results found for: BNP  Lab Results   Component Value Date    TROPONINI <0.01 06/27/2015     Lab Results   Component Value Date    TSH 2.700 12/05/2018     No results found for: LACTATE  No results found for: CORTISOL    Results from last 7 days   Lab Units 01/31/20  0957 01/31/20  0446 01/30/20  0338 01/29/20  0343  01/28/20  1858   PH, ARTERIAL pH units 7.506* 7.465* 7.476* 7.436 7.371   PCO2, ARTERIAL mm Hg 36.3 40.8 37.2 39.7 49.6*   PO2 ART mm Hg 63.1* 67.4* 88.8 83.7 96.6   HCO3 ART mmol/L 28.6* 29.4* 27.5* 26.7* 28.7*   FIO2 % 35 30 40 40 60       I reviewed the patient's results, images and medication.    Assessment/Plan   ASSESSMENT        Acute L frontal ICH 1/28/20    S/P L frontal craniotomy and evacuation of intraparenchymal hematoma 1/30/20    Hypertensive emergency    Acute hypoxemic/hypercarbic respiratory failure due to ICH. Required Ventilatory support    JAYESH (obstructive sleep apnea)    Tobacco abuse    Elevated transaminase levels    Dyslipidemia      DISCUSSION: Neuro status appears about the same.  Actually appears a little bit more alert to me today but of course has residual severe hemiparesis, dysarthria, a aphasia, and dysphagia.  Unfortunate she has pulled her NG tube again as it makes it difficult to treat her with oral antihypertensives and resume her enteral feeds.  We will need to bridle tube when replaced today.  It is a moot point as she is going to have a PEG tube placed tomorrow.    PLAN     1.  Place NG tube today and resume oral antihypertensives and enteral feeds  2.  PEG feeding tube tomorrow  3.  Continue to adjust antihypertensive so we can take her off Cardene  4.  Need to start looking for LTACH or nursing facility that will accept PEG feeding tube  5.  Repeat WBC and watch fever curve  6.  Discontinue Ryan in place pure wick  7.  Empirically begin Ancef 1 g every 8 hours    Plan of care and goals reviewed with mulitdisciplinary team at daily rounds.    I discussed the patient's findings and my recommendations with patient, family and nursing staff    High level of risk due to: severe exacerbation of chronic illness, illness with threat to life or bodily function and parenteral controlled substances.    Time spent Critical care 25 min (It does not include procedure time).    Mauricio MORA  MD [facility]  Intensive Care Medicine  02/02/20 8:05 PM

## 2020-02-03 NOTE — PROGRESS NOTES
"NEUROSURGERY PROGRESS NOTE    Interval History:   Hospital day 6 postoperative day 4 status post left frontal craniotomy for evacuation of intraparenchymal hematoma.  Patient underwent PEG tube placement this morning.  Sodium is 125 this morning.    Vital Signs  Blood pressure 126/81, pulse 101, temperature 98.9 °F (37.2 °C), temperature source Axillary, resp. rate 16, height 167.6 cm (66\"), weight 67.8 kg (149 lb 7.6 oz), SpO2 100 %.    Physical Exam:  Patient is drowsy and sleeping status post PEG placement this morning  She will awaken to voice  Remains flaccid in right upper and lower extremity     Results Review:    I/O last 3 completed shifts:  In: 3730.8 [I.V.:2887.8; Other:153; NG/GT:640; IV Piggyback:50]  Out: 3970 [Urine:3970]  No intake/output data recorded.  Results from last 7 days   Lab Units 02/03/20  0418 02/02/20  0522 02/01/20  0354   SODIUM mmol/L 125* 127* 131*   POTASSIUM mmol/L 3.8 3.8 3.4*   CHLORIDE mmol/L 86* 88* 93*   CO2 mmol/L 22.0 25.0 24.0   BUN mg/dL 12 13 10   CREATININE mg/dL 0.32* 0.32* 0.35*   GLUCOSE mg/dL 111* 143* 113*   CALCIUM mg/dL 9.1 9.4 9.2         Assessment/Plan:   Status post left frontal craniotomy  Sodium is 125 and has been trending down.  Presumptive etiology is SIADH.  Manage with fluid restriction and continue to monitor sodium.      Kathy Del Real PA-C  02/03/20  9:54 AM    "

## 2020-02-03 NOTE — PLAN OF CARE
Problem: Patient Care Overview  Goal: Plan of Care Review  Outcome: Ongoing (interventions implemented as appropriate)  Flowsheets (Taken 2/3/2020 6184)  Outcome Summary: Patient was able to improve activity tolerance this session. She transferred supine>sit with Max Ax2, STS with Max A x2 and stand pivot transfer with Max A x2 with BUE support and R knee blocked. Pt demonstrates decreased weight shifting ability and R neglect throughout session. Continue to progress as appropriate.

## 2020-02-03 NOTE — NURSING NOTE
Spoke with Dr. Mobley in rounds regarding patient's Na level that has been trending down. He asked me to speak with Dr. Cole regarding orders for patient. Per Dr. Cole discontinue the free water with the tube feeding and continue to monitor patient's Na level with goal of normal Na level. No fluid orders at this time.

## 2020-02-03 NOTE — THERAPY TREATMENT NOTE
Patient Name: Yee Goodwin  : 1970    MRN: [id number]                              Today's Date: 2/3/2020       Admit Date: 2020    Visit Dx:     ICD-10-CM ICD-9-CM   1. Acute intracerebral hemorrhage (CMS/HCC) I61.9 431   2. L frontal ICH  I62.9 432.9   3. Intracranial hemorrhage (CMS/HCC) I62.9 432.9   4. Dysphagia, unspecified type R13.10 787.20   5. Aphasia R47.01 784.3   6. Apraxia R48.2 784.69   7. Impaired mobility and ADLs Z74.09 799.89     Patient Active Problem List   Diagnosis   • History of right hip replacement   • PUD (peptic ulcer disease)   • S/P madan   • Essential hypertension   • Gastritis   • JAYESH (obstructive sleep apnea)   • Epigastric pain   • Diarrhea   • Diverticulosis   • Hiatal hernia   • Tobacco abuse   • Acute L frontal ICH 20   • Hypertensive emergency   • Elevated transaminase levels   • Dyslipidemia   • Acute hypoxemic/hypercarbic respiratory failure due to ICH. Required Ventilatory support   • Acute intracerebral hemorrhage (CMS/HCC)   • S/P L frontal craniotomy and evacuation of intraparenchymal hematoma 20     Past Medical History:   Diagnosis Date   • Arthritis    • Diverticulitis    • GERD (gastroesophageal reflux disease)    • Hypertension      Past Surgical History:   Procedure Laterality Date   • CEREBRAL ANGIOGRAM N/A 2020    Procedure: Cerebral angiogram;  Surgeon: Merlin Black MD;  Location:  [facility] C[facility]AS[facility];  Service: Interventional Radiology   • CHOLECYSTECTOMY     • COLONOSCOPY     • CRANIOTOMY FOR SUBDURAL HEMATOMA Left 2020    Procedure: FRONTAL CRANIOTOMY FOR EVACUATION OF INTRAPARENCHYMAL HEMATOMA  LEFT;  Surgeon: Jose Cole MD;  Location:  [facility] OR;  Service: Neurosurgery   • HAND TENDON SURGERY Right     patient hand right thumb tendon surgery   • HIP SURGERY     • JOINT REPLACEMENT     • TUBAL ABDOMINAL LIGATION       General Information     Row Name 20 1453          PT Evaluation  Time/Intention    Document Type  therapy note (daily note)  -NS     Mode of Treatment  individual therapy;physical therapy  -NS     Row Name 02/03/20 1455          General Information    Patient Profile Reviewed?  yes  -NS     Existing Precautions/Restrictions  fall;oxygen therapy device and L/min;other (see comments) R sided weakness, expressive aphasia, PEG  -NS     Row Name 02/03/20 1457          Cognitive Assessment/Intervention- PT/OT    Orientation Status (Cognition)  oriented x 3;verbal cues/prompts needed for orientation  -NS     Cognitive Assessment/Intervention Comment  Communicates with thumbs up/down. Patient lethargic.  -NS     Row Name 02/03/20 1454          Safety Issues, Functional Mobility    Safety Issues Affecting Function (Mobility)  safety precaution awareness;safety precautions follow-through/compliance;sequencing abilities  -NS     Impairments Affecting Function (Mobility)  balance;cognition;coordination;endurance/activity tolerance;strength;visual/perceptual;postural/trunk control;range of motion (ROM);motor control;grasp  -NS       User Key  (r) = Recorded By, (t) = Taken By, (c) = Cosigned By    Initials Name Provider Type    NS Jeanette Busby, PT Physical Therapist        Mobility     Row Name 02/03/20 145          Bed Mobility Assessment/Treatment    Bed Mobility Assessment/Treatment  supine-sit;scooting/bridging  -NS     Scooting/Bridging [place] (Bed Mobility)  maximum assist (25% patient effort);2 person assist;verbal cues  -NS     Supine-Sit [place] (Bed Mobility)  maximum assist (25% patient effort);2 person assist;verbal cues  -NS     Assistive Device (Bed Mobility)  bed rails;draw sheet;head of bed elevated  -NS     Comment (Bed Mobility)  VCing for sequencing. Once sitting EOB, pt required Max A to maintain sitting balance with moments of CGA. Pt tends to lean R with R neglect.  -NS     Row Name 02/03/20 1458          Transfer Assessment/Treatment    Comment (Transfers)   VCing for sequencing. R knee blocked due to buckling. Patient practiced weight shifting in standing and completed stand pivot transfer with R knee blocked, demonstrating decreased weight shifting ability and significant R weakness with R knee blocked throughout.   -NS     Row Name 02/03/20 1453          Bed-Chair Transfer    Bed-Chair [place] (Transfers)  maximum assist (25% patient effort);2 person assist  -NS     Assistive Device (Bed-Chair Transfers)  other (see comments) BUE support  -NS     Row Name 02/03/20 1453          Sit-Stand Transfer    Sit-Stand [place] (Transfers)  maximum assist (25% patient effort);2 person assist  -NS     Assistive Device (Sit-Stand Transfers)  other (see comments) BUE support  -NS     Row Name 02/03/20 1453          Gait/Stairs Assessment/Training    Comment (Gait/Stairs)  Not appropriate to assess this date.  -NS       User Key  (r) = Recorded By, (t) = Taken By, (c) = Cosigned By    Initials Name Provider Type    NS Jeanette Busby PT Physical Therapist        Obj/Interventions     Row Name 02/03/20 1453          Therapeutic Exercise    Lower Extremity (Therapeutic Exercise)  marching while seated;LAQ (long arc quad), bilateral  -NS     Lower Extremity Range of Motion (Therapeutic Exercise)  ankle dorsiflexion/plantar flexion, bilateral  -NS     Exercise Type (Therapeutic Exercise)  AROM (active range of motion);PROM (passive range of motion)  -NS     Position (Therapeutic Exercise)  seated  -NS     Sets/Reps (Therapeutic Exercise)  1x10  -NS     Expected Outcome (Therapeutic Exercise)  facilitate normal movement patterns;improve functional stability  -NS     Comment (Therapeutic Exercise)  AROM L LE, PROM R LE  -NS     Row Name 02/03/20 1453          Static Sitting Balance    Level of [place] (Unsupported Sitting, Static Balance)  maximal assist, 25 to 49% patient effort  -NS     Sitting Position (Unsupported Sitting, Static Balance)  sitting in chair  -NS     Time  Able to Maintain Position (Unsupported Sitting, Static Balance)  30 to 45 seconds  -NS     Comment (Unsupported Sitting, Static Balance)  Progressed to moments of CGA.  -NS     Row Name 02/03/20 1453          Static Standing Balance    Level of [place] (Supported Standing, Static Balance)  maximal assist, 25 to 49% patient effort;1 person assist  -NS     Assistive Device Utilized (Supported Standing, Static Balance)  other (see comments) BUE support  -NS     Row Name 02/03/20 1453          Dynamic Standing Balance    Level of [place], Reaches Outside Midline (Standing, Dynamic Balance)  maximal assist, 25 to 49% patient effort;2 person assist  -NS     Assistive Device Utilized (Supported Standing, Dynamic Balance)  other (see comments) BUE support  -NS       User Key  (r) = Recorded By, (t) = Taken By, (c) = Cosigned By    Initials Name Provider Type    Jeanette Gardner PT Physical Therapist        Goals/Plan    No documentation.       Clinical Impression     Row Name 02/03/20 1453          Pain Assessment    Additional Documentation  Pain Scale: FACES Pre/Post-Treatment (Group)  -NS     Row Name 02/03/20 1459          Pain Scale: FACES Pre/Post-Treatment    Pain: FACES Scale, Pretreatment  0-->no hurt  -NS     Pain: FACES Scale, Post-Treatment  0-->no hurt  -NS     Row Name 02/03/20 1454          Plan of Care Review    Plan of Care Reviewed With  patient;significant other  -NS     Progress  improving  -NS     [facility] Name 02/03/20 145          Vital Signs    Pre Systolic BP Rehab  130  -NS     Pre Treatment Diastolic BP  72  -NS     Post Systolic BP Rehab  137  -NS     Post Treatment Diastolic BP  75  -NS     Pretreatment Heart Rate (beats/min)  86  -NS     Posttreatment Heart Rate (beats/min)  84  -NS     Pre SpO2 (%)  98  -NS     O2 Delivery Pre Treatment  nasal cannula  -NS     Post SpO2 (%)  98  -NS     O2 Delivery Post Treatment  nasal cannula  -NS     Pre Patient Position  Supine  -NS     Intra  Patient Position  Standing  -NS     Post Patient Position  Sitting  -NS     Row Name 02/03/20 1453          Positioning and Restraints    Pre-Treatment Position  in bed  -NS     Post Treatment Position  chair  -NS     In Chair  notified nsg;reclined;call light within reach;encouraged to call for assist;exit alarm on;with family/caregiver;RUE elevated;LUE elevated;waffle cushion;on mechanical lift sling;legs elevated;heels elevated  -NS     Restraints  released:;reapplied:;notified nsg:;soft limb  -NS       User Key  (r) = Recorded By, (t) = Taken By, (c) = Cosigned By    Initials Name Provider Type    Jeanette Gardner PT Physical Therapist        Outcome Measures     Row Name 02/03/20 1453          How much help from another person do you currently need...    Turning from your back to your side while in flat bed without using bedrails?  2  -NS     Moving from lying on back to sitting on the side of a flat bed without bedrails?  2  -NS     Moving to and from a bed to a chair (including a wheelchair)?  2  -NS     Standing up from a chair using your arms (e.g., wheelchair, bedside chair)?  2  -NS     Climbing 3-5 steps with a railing?  1  -NS     To walk in hospital room?  1  -NS     AM-PAC 6 Clicks Score (PT)  10  -NS     Row Name 02/03/20 1453          Modified Rachell Scale    Pre-Stroke Modified Milan Scale  0 - No Symptoms at all.  -NS     Modified [place] Scale  4 - Moderately severe disability.  Unable to walk without assistance, and unable to attend to own bodily needs without assistance.  -NS     Row Name 02/03/20 1456          Functional Assessment    Outcome Measure Options  AM-PAC 6 Clicks Basic Mobility (PT)  -NS       User Key  (r) = Recorded By, (t) = Taken By, (c) = Cosigned By    Initials Name Provider Type    Jeanette Gardner PT Physical Therapist          PT Recommendation and Plan     Plan of Care Reviewed With: patient, significant other  Progress: improving  Outcome Summary: Patient was able to  improve activity tolerance this session. She transferred supine>sit with Max Ax2, STS with Max A x2 and stand pivot transfer with Max A x2 with BUE support and R knee blocked. Pt demonstrates decreased weight shifting ability and R neglect throughout session. Continue to progress as appropriate.     Time Calculation:   PT Charges     Row Name 02/03/20 1453             Time Calculation    Start Time  1453  -NS      PT Received On  02/03/20  -NS      PT Goal Re-Cert Due Date  02/11/20  -NS         Timed Charges    03414 - PT Therapeutic Exercise Minutes  9  -NS      70487 - PT Therapeutic Activity Minutes  22  -NS        User Key  (r) = Recorded By, (t) = Taken By, (c) = Cosigned By    Initials Name Provider Type    NS Jeanette Busby, TOM Physical Therapist        Therapy Charges for Today     Code Description Service Date Service Provider Modifiers Qty    34551829025 HC PT THER PROC EA 15 MIN 2/3/2020 Jeanette Busby, PT GP 1    42120662497 HC PT THERAPEUTIC ACT EA 15 MIN 2/3/2020 Jeanette Busby, PT GP 1          PT G-Codes  Outcome Measure Options: AM-PAC 6 Clicks Basic Mobility (PT)  AM-PAC 6 Clicks Score (PT): 10  AM-PAC 6 Clicks Score (OT): 6  Modified Rachell Scale: 4 - Moderately severe disability.  Unable to walk without assistance, and unable to attend to own bodily needs without assistance.    Jeanette Busby PT  2/3/2020

## 2020-02-03 NOTE — PROGRESS NOTES
"                  Clinical Nutrition     Nutrition Support Assessment  Reason for Visit:   MDR, EN    Patient Name: Yee Goodwin  YOB: 1970  MRN: [id number]  Date of Encounter: 02/03/20 8:08 AM  Admission date: 1/28/2020    Nutrition Assessment     Applicable diagnosis, conditions, procedures    Hypertensive emergency    Hypokalemia    Elevated transaminase levels    Dyslipidemia    Acute respiratory failure with hypoxia and hypercapnia     Extubated (1/30)    Acute intracerebral hemorrhage (CMS/HCC)    (1/30) S/P Left frontal lobe craniotomy for evacuation of intraparenchymal hematoma    Dysphagia      (1/31) SLP Bedside eval - Recommend for NPO status               (2/2) SLP re-eval - with recommendation for NPO, long term alternate methods of nutrition/hydration   (2/3) PEG pending     Applicable PMH/ PSxH    JAYESH (obstructive sleep apnea)    Tobacco abuse    Arthritis    Diverticulitis    GERD    HTN      Reported/Observed/Food/Nutrition Related History:     Pulled ND tube out on 2/1, attempts to replace it have been unsuccessful because of difficulty passing it through the nares.  Plan for PEG this morning.      Per RN during MDR - PEG has already emil placed this morning.  Plan to restart tube feeding via PEG per surgeon discretion.    Patient sleeping at time of visit, family at beside and reports patient has been very drowsy since PEG placement this morning.  No EN running yet.      Anthropometrics     Height: 167.6 cm (66\")  Last filed wt: Weight: 67.8 kg (149 lb 7.6 oz) (02/03/20 0600)  Weight Method: Bed scale    BMI: BMI (Calculated): 26.9  Overweight: 25.0-29.9kg/m2     Ideal Body Weight (IBW) (kg): 59.58    Labs reviewed     Results from last 7 days   Lab Units 02/03/20  0418   SODIUM mmol/L 125*   POTASSIUM mmol/L 3.8   CHLORIDE mmol/L 86*   CO2 mmol/L 22.0   BUN mg/dL 12   CREATININE mg/dL 0.32*   CALCIUM mg/dL 9.1   BILIRUBIN mg/dL 0.6   ALK PHOS U/L 117   ALT (SGPT) U/L 94*   AST " (SGOT) U/L 34*   GLUCOSE mg/dL 111*     Results from last 7 days   Lab Units 02/03/20  0508 02/02/20  2336 02/02/20  1811 02/02/20  1146 02/02/20  0510 02/02/20  0033   GLUCOSE mg/dL 119 140* 112 138* 154* 138*     Medications reviewed   Pertinent:  SSI, protonix   GTT:  cardene     Intake/Ouptut 24 hrs (7:00AM - 6:59 AM)     Intake & Output (last day)       02/02 0701 - 02/03 0700 02/03 0701 - 02/04 0700    I.V. (mL/kg) 1912.6 (28.2)     Other 20     NG/     IV Piggyback 50     Total Intake(mL/kg) 2150.6 (31.7)     Urine (mL/kg/hr) 1995 (1.2)     Total Output 1995     Net +155.6               Needs Assessment   Height used:  167.6cm  Weight used:  75.7kg     Estimated calorie needs: ~1800 kcal/day  Method:Kcals/KG 25=  1893  Method:HBE x1.2= 1733    Estimated protein needs: ~114 g pro/day  1.5 g/kg= 114g pro    Current Nutrition Prescription     PO: NPO Diet  Oral Nutrition Supplement:    Tube Feeding: Impact Peptide @ goal rate 60ml/hr.  TGV 1200ml.  Free water @ 10ml/hr   Route: PEG pending (2/3)  Verified at bedside: on hold     Evaluation of Received Nutrient/Fluid Intake:  Insufficient data patient pulled OG tube out on 2/1- unsuccessful attempt to replace.     Nutrition Diagnosis   1/30 Updated 2/3  Problem Inadequate energy intake (kcal, protein)    Etiology Clinical condition; diet order    Signs/Symptoms NPO requiring vent; s/P crani    Status - no tube feeding acces    Nutrition Intervention   1.  Follow treatment progress, Care plan reviewed   - Start Impact Peptide @ 25ml/hr @ 1700  - Advance by 10ml Q 6hrs to gaol of 60ml/hr   - Hold free water for now per intesivitist order due to hyponatremia this morning.      At Target Goal Volume     % Est needs   Volume  1200ml*      Energy/kcals 1800kcals 100%   Protein 113g pro 100%   Fiber 0g         Fluid   W/Free water 924ml  1124ml       *TGV calculated based on 20hrs delivery with anticipated interruption in EN infusion for routine patient  care    Goal:   General: Nutrition support treatment  PO: n/a  EN/PN: Initiate EN    Additional goals:      Monitoring/Evaluation:   Per protocol, Pertinent labs, EN delivery/tolerance, POC/GOC      Will Continue to follow per protocol      Chen Wetzel RD  Time Spent: 30+ min

## 2020-02-03 NOTE — OP NOTE
"Operative Report    Patient Name:  Yee Goodwin  YOB: 1970  [id number]    2/3/2020        PREOPERATIVE DIAGNOSIS:  Feeding difficulty (R63.3)      POSTOPERATIVE DIAGNOSIS: Same      PROCEDURE PERFORMED: EGD with PEG placement (63082)      SURGEON: Andrew Young MD      ASSISTANT: None       ANESTHESIA: Sedation with 3 mg Versed and 75 mcg Fentanyl.     I was present, directed and supervised the entire sedation and monitoring of this patient for a grand total of 15 minutes.      SPECIMENS: None      FINDINGS: 20 Italian PEG tube placed at the 4.5 cm level      INDICATIONS:       The patient is a 49 y.o. year old female with a history of feeding difficulty who we have been asked to see for long term feeding access. The risks and benefits of EGD with PEG placement were discussed at length with the patient and the patient's family and they agreed to proceed.      DESCRIPTION OF PROCEDURE:     After obtaining informed consent, the patient was taken to the endoscopy suite. They were placed in modified Taylor position, and after appropriate sedation as detailed above, a bite block was placed into the patient's mouth. The endoscope was advanced into the mouth and into the esophagus without difficulty. It was advanced into the stomach, and into the duodenum, which was normal  . The scope was then returned to the stomach, and the stomach was maximally insufflated. An appropriate site for PEG placement was then determined by palpation, transillumination, and the \"safe track\" needle technique. This area was prepped and draped in standard sterile fashion, and after infiltrating the skin with local anesthetic, a 7mm skin incision was made in this location. A needle was advanced through this incision and into the stomach under endoscopic guidance. A wire was then placed through the needle, grasped with the endoscope, and brought out through the mouth. At this point, using the Ponsky Pull technique, a 20 Italian " PEG tube was brought through the abdominal wall in this location. The endoscope was again advanced through the mouth and esophagus and into the stomach, where the PEG bumper could be seen abutting the anterior gastric wall in standard fashion without bleeding. The endoscope was then used to desufflate the stomach, and removed from the patient's mouth without difficulty. The PEG tube was secured at the 4.5 cm level, with the bumper at the 5 cm level. It was sutured to the skin, dressed in standard sterile fashion, and placed to gravity drainage. The patient tolerated the procedure well. They were then transported to the recovery room in stable condition. All sponge and needle counts were correct times two at the completion of the case. There were no immediate complications.         Andrew Young MD  2/3/2020  8:52 AM

## 2020-02-03 NOTE — BRIEF OP NOTE
ESOPHAGOGASTRODUODENOSCOPY WITH PERCUTANEOUS ENDOSCOPIC GASTROSTOMY TUBE INSERTION AT BEDSIDE  Progress Note    Yee Goodwin  2/3/2020    Pre-op Diagnosis:   Feeding difficulty       Post-Op Diagnosis Codes:   Same    Procedure/CPT® Codes:      Procedure(s):  ESOPHAGOGASTRODUODENOSCOPY WITH PERCUTANEOUS ENDOSCOPIC GASTROSTOMY TUBE INSERTION AT BEDSIDE    Surgeon(s):  Andrew Young MD    Anesthesia: General    Staff:   Endo Nurse: Cydney Omalley RN    Estimated Blood Loss: minimal    Urine Voided: * No values recorded between 2/3/2020  7:55 AM and 2/3/2020  8:01 AM *    Specimens:                None          Drains:   NG/OG Tube Nasogastric 16 Fr Left nostril (Active)   Placement Verification X-ray 2/3/2020  6:00 AM   Site Assessment Clean;Dry;Intact 2/3/2020  6:00 AM   Securement nasal septal tie 2/3/2020  6:00 AM   Status Clamped 2/3/2020  6:00 AM   Flush/ Irrigation Intake (mL) 20 2/3/2020 12:00 AM   Tube Feeding Intake (mL) 48 2/3/2020 12:00 AM   Intake (mL) 30 mL 2/3/2020  6:00 AM       External Urinary Catheter (Active)   Site Assessment Clean;Skin intact 2/3/2020  6:00 AM   Application/Removal external catheter changed;skin care provided 2/3/2020  4:00 AM   Collection Container Wall suction 2/3/2020  6:00 AM       [REMOVED] NG/OG Tube Orogastric 14 Fr Center mouth (Removed)   Placement Verification X-ray 1/30/2020 12:00 PM   Site Assessment Clean;Dry;Intact 1/31/2020 10:00 AM   Securement other (see comments) 1/31/2020 10:00 AM   Secured at (cm) 55 1/31/2020  6:00 AM   Status Irrigated 1/30/2020  8:00 PM   Drainage Appearance None 1/31/2020 10:00 AM   Surrounding Skin Dry;Intact 1/31/2020 10:00 AM   Tube Feeding Frequency Continuous 1/31/2020 10:00 AM   Tube Feeding Product Impact Peptide 1.5 richard 1/31/2020  8:00 AM   Tube Feeding Method Continuous per pump 1/31/2020  8:00 AM   Tube Feeding Rate (mL) 45 mL 1/31/2020  8:00 AM   Tube Feeding Bag Changed No 1/31/2020 10:00 AM   Flush/ Irrigation Intake  (mL) 30 1/31/2020  6:00 AM   Tube Feeding Intake (mL) 75 1/31/2020  6:00 AM   Intake (mL) 60 mL 1/30/2020  8:00 PM       [REMOVED] NG/OG Tube Nasojejunal 10 Fr Right nostril (Removed)   Placement Verification X-ray 2/1/2020 10:00 AM   Site Assessment Clean;Dry;Intact 2/1/2020 10:00 AM   Securement nasal septal tie 2/1/2020 10:00 AM   Status Clamped 2/1/2020 10:00 AM   Surrounding Skin Dry;Intact 2/1/2020 10:00 AM   Tube Feeding Frequency Continuous 1/31/2020  6:00 PM   Tube Feeding Product Impact Peptide 1.5 richard 1/31/2020  6:00 PM   Tube Feeding Strength Full strength 1/31/2020  6:00 PM   Tube Feeding Method Continuous per pump 1/31/2020  6:00 PM   Tube Feeding Rate (mL) 25 mL 1/31/2020  6:00 PM   Tube Feeding Bag Changed Yes 1/31/2020  6:00 PM   Feeding Tube Flushed With Sterile water 1/31/2020  8:00 PM   Flush/ Irrigation Intake (mL) 71 1/31/2020  8:00 PM   Tube Feeding Intake (mL) 183 1/31/2020  8:00 PM       [REMOVED] NG/OG Tube Nasogastric 16 Fr Left nostril (Removed)   Placement Verification X-ray 2/2/2020  4:00 AM   Site Assessment Clean;Dry;Intact 2/2/2020  4:00 AM   Securement taped to nostril center 2/2/2020  4:00 AM   Secured at (cm) 70 2/1/2020 10:00 PM   Status Clamped 2/2/2020  4:00 AM   Surrounding Skin Dry;Intact 2/2/2020  4:00 AM   Tube Feeding Frequency Continuous 2/2/2020  4:00 AM   Tube Feeding Product Impact Peptide 1.5 richard 2/2/2020  4:00 AM   Tube Feeding Strength Full strength 2/2/2020  4:00 AM   Tube Feeding Method Continuous per pump 2/2/2020  4:00 AM   Tube Feeding Rate (mL) 45 mL 2/2/2020  4:00 AM   Tube Feeding Bag Changed No 2/1/2020  6:00 PM   Flush/ Irrigation Intake (mL) 31 2/2/2020  6:00 AM   Tube Feeding Intake (mL) 114 2/2/2020  6:00 AM   Intake (mL) 60 mL 2/2/2020  4:00 AM       [REMOVED] Urethral Catheter Temperature probe 16 Fr. (Removed)   Daily Indications Hourly Output in Critical Unstable Patient requiring Frequent Intervention (hemodynamics, titration or life supportive  therapy) 2/2/2020 10:00 PM   Site Assessment Clean;Skin intact 2/2/2020 10:00 PM   Collection Container Standard drainage bag 2/2/2020 10:00 PM   Securement Method Securing device 2/2/2020 10:00 PM   Catheter care complete Yes 2/2/2020  8:00 PM   Irrigation Ease no resistance met 1/28/2020  6:31 PM   Output (mL) 175 mL 2/2/2020 10:00 PM       Findings:   1. 20 Fr PEG at 4.5 cm    Complications: None      Andrew Young MD     Date: 2/3/2020  Time: 8:51 AM

## 2020-02-03 NOTE — PROGRESS NOTES
"Patient Name:  Yee Goodwin  YOB: 1970  [id number]    Surgery Progress Note    Date of visit: 2/3/2020    Subjective   Subjective: Stable overnight.         Objective     Objective:     /82 (BP Location: Left arm, Patient Position: Lying)   Pulse 113   Temp 98.9 °F (37.2 °C) (Axillary)   Resp 16   Ht 167.6 cm (66\")   Wt 67.8 kg (149 lb 7.6 oz)   LMP  (LMP Unknown)   SpO2 97%   BMI 24.13 kg/m²     Intake/Output Summary (Last 24 hours) at 2/3/2020 0629  Last data filed at 2/3/2020 0600  Gross per 24 hour   Intake 2040.58 ml   Output 1995 ml   Net 45.58 ml       CV:  Rhythm  regular and rate regular   L:  Rhonchi to auscultation bilaterally   Abd:  Bowel sounds positive , soft, obese  Ext:  No cyanosis, clubbing, edema    Recent labs that are back at this time have been reviewed.        Assessment/Plan     Assessment/ Plan:    Hospital Problem List     * (Principal) Acute L frontal ICH 1/28/20 - For PEG today      JAYESH (obstructive sleep apnea)    Tobacco abuse    Hypertensive emergency        Elevated transaminase levels    Dyslipidemia    Acute hypoxemic/hypercarbic respiratory failure due to ICH. Required Ventilatory support    S/P L frontal craniotomy and evacuation of intraparenchymal hematoma 1/30/20              Andrew Young MD  2/3/2020  6:29 AM      "

## 2020-02-03 NOTE — PLAN OF CARE
Problem: Patient Care Overview  Goal: Plan of Care Review  Outcome: Ongoing (interventions implemented as appropriate)  Flowsheets (Taken 2/3/2020 0537)  Progress: no change  Plan of Care Reviewed With: patient; significant other  Outcome Summary: NIHSS 23 at shift change. Pt continues to withdraw to deep pressure in RLE but does not withdraw in RUE. PT remains tachy with HR in 100's-120's. PT has been weaned to room air and appears to be tolerating. Pt TMax 100. PT is drowsy but will respond to verbal stimulation. Ryan re moved at 2200 per order, Purewick placed. Pt did not sponstaneously void. In/Out cath at 0400 with a total of 700mL out. Will continue to monitor.       Problem: Stroke (Hemorrhagic) (Adult)  Goal: Signs and Symptoms of Listed Potential Problems Will be Absent, Minimized or Managed (Stroke)  Outcome: Ongoing (interventions implemented as appropriate)     Problem: Skin Injury Risk (Adult)  Goal: Skin Health and Integrity  Outcome: Ongoing (interventions implemented as appropriate)     Problem: Restraint, Nonbehavioral (Nonviolent)  Goal: Rationale and Justification  Outcome: Ongoing (interventions implemented as appropriate)  Flowsheets (Taken 2/3/2020 0537)  Rationale and Justification: prevent line/tube removal     Problem: Restraint, Nonbehavioral (Nonviolent)  Goal: Nonbehavioral (Nonviolent) Restraint: Absence of Injury/Harm  Outcome: Ongoing (interventions implemented as appropriate)  Flowsheets (Taken 2/3/2020 0537)  Nonbehavioral (Nonviolent) Restraint: Absence of Injury/Harm: met     Problem: Restraint, Nonbehavioral (Nonviolent)  Goal: Nonbehavioral (Nonviolent) Restraint: Achievement of Discontinuation Criteria  Outcome: Ongoing (interventions implemented as appropriate)  Flowsheets (Taken 2/3/2020 0537)  Nonbehavioral (Nonviolent) Restraint: Achievement of Discontinuation Criteria: not met     Problem: Restraint, Nonbehavioral (Nonviolent)  Goal: Nonbehavioral (Nonviolent) Restraint:  Preservation of Dignity and Wellbeing  Outcome: Ongoing (interventions implemented as appropriate)  Flowsheets (Taken 2/3/2020 0545)  Nonbehavioral (Nonviolent) Restraint: Preservation of Dignity and Wellbeing: met

## 2020-02-04 LAB
ALBUMIN SERPL-MCNC: 3.6 G/DL (ref 3.5–5.2)
ALBUMIN/GLOB SERPL: 1 G/DL
ALP SERPL-CCNC: 128 U/L (ref 39–117)
ALT SERPL W P-5'-P-CCNC: 77 U/L (ref 1–33)
ANION GAP SERPL CALCULATED.3IONS-SCNC: 13 MMOL/L (ref 5–15)
AST SERPL-CCNC: 30 U/L (ref 1–32)
BASOPHILS # BLD AUTO: 0.04 10*3/MM3 (ref 0–0.2)
BASOPHILS NFR BLD AUTO: 0.3 % (ref 0–1.5)
BILIRUB SERPL-MCNC: 0.4 MG/DL (ref 0.2–1.2)
BILIRUB UR QL STRIP: NEGATIVE
BUN BLD-MCNC: 15 MG/DL (ref 6–20)
BUN/CREAT SERPL: 40.5 (ref 7–25)
CALCIUM SPEC-SCNC: 8.9 MG/DL (ref 8.6–10.5)
CHLORIDE SERPL-SCNC: 91 MMOL/L (ref 98–107)
CLARITY UR: CLEAR
CO2 SERPL-SCNC: 27 MMOL/L (ref 22–29)
COLOR UR: YELLOW
CREAT BLD-MCNC: 0.37 MG/DL (ref 0.57–1)
DEPRECATED RDW RBC AUTO: 40.1 FL (ref 37–54)
EOSINOPHIL # BLD AUTO: 0.15 10*3/MM3 (ref 0–0.4)
EOSINOPHIL NFR BLD AUTO: 1.1 % (ref 0.3–6.2)
ERYTHROCYTE [DISTWIDTH] IN BLOOD BY AUTOMATED COUNT: 12.1 % (ref 12.3–15.4)
GFR SERPL CREATININE-BSD FRML MDRD: >150 ML/MIN/1.73
GLOBULIN UR ELPH-MCNC: 3.6 GM/DL
GLUCOSE BLD-MCNC: 157 MG/DL (ref 65–99)
GLUCOSE BLDC GLUCOMTR-MCNC: 138 MG/DL (ref 70–130)
GLUCOSE BLDC GLUCOMTR-MCNC: 138 MG/DL (ref 70–130)
GLUCOSE BLDC GLUCOMTR-MCNC: 154 MG/DL (ref 70–130)
GLUCOSE BLDC GLUCOMTR-MCNC: 158 MG/DL (ref 70–130)
GLUCOSE UR STRIP-MCNC: NEGATIVE MG/DL
HCT VFR BLD AUTO: 35.6 % (ref 34–46.6)
HGB BLD-MCNC: 12.1 G/DL (ref 12–15.9)
HGB UR QL STRIP.AUTO: NEGATIVE
IMM GRANULOCYTES # BLD AUTO: 0.15 10*3/MM3 (ref 0–0.05)
IMM GRANULOCYTES NFR BLD AUTO: 1.1 % (ref 0–0.5)
KETONES UR QL STRIP: ABNORMAL
LEUKOCYTE ESTERASE UR QL STRIP.AUTO: NEGATIVE
LYMPHOCYTES # BLD AUTO: 1.46 10*3/MM3 (ref 0.7–3.1)
LYMPHOCYTES NFR BLD AUTO: 11 % (ref 19.6–45.3)
MAGNESIUM SERPL-MCNC: 2.3 MG/DL (ref 1.6–2.6)
MCH RBC QN AUTO: 31 PG (ref 26.6–33)
MCHC RBC AUTO-ENTMCNC: 34 G/DL (ref 31.5–35.7)
MCV RBC AUTO: 91.3 FL (ref 79–97)
MONOCYTES # BLD AUTO: 1.42 10*3/MM3 (ref 0.1–0.9)
MONOCYTES NFR BLD AUTO: 10.7 % (ref 5–12)
NEUTROPHILS # BLD AUTO: 10.03 10*3/MM3 (ref 1.7–7)
NEUTROPHILS NFR BLD AUTO: 75.8 % (ref 42.7–76)
NITRITE UR QL STRIP: NEGATIVE
NRBC BLD AUTO-RTO: 0 /100 WBC (ref 0–0.2)
PH UR STRIP.AUTO: 7.5 [PH] (ref 5–8)
PHOSPHATE SERPL-MCNC: 2.8 MG/DL (ref 2.5–4.5)
PLATELET # BLD AUTO: 368 10*3/MM3 (ref 140–450)
PMV BLD AUTO: 9.3 FL (ref 6–12)
POTASSIUM BLD-SCNC: 3.7 MMOL/L (ref 3.5–5.2)
PROT SERPL-MCNC: 7.2 G/DL (ref 6–8.5)
PROT UR QL STRIP: NEGATIVE
RBC # BLD AUTO: 3.9 10*6/MM3 (ref 3.77–5.28)
SODIUM BLD-SCNC: 131 MMOL/L (ref 136–145)
SP GR UR STRIP: 1.02 (ref 1–1.03)
UROBILINOGEN UR QL STRIP: ABNORMAL
WBC NRBC COR # BLD: 13.25 10*3/MM3 (ref 3.4–10.8)

## 2020-02-04 PROCEDURE — 99233 SBSQ HOSP IP/OBS HIGH 50: CPT | Performed by: INTERNAL MEDICINE

## 2020-02-04 PROCEDURE — 25010000002 HEPARIN (PORCINE) PER 1000 UNITS: Performed by: INTERNAL MEDICINE

## 2020-02-04 PROCEDURE — 81003 URINALYSIS AUTO W/O SCOPE: CPT | Performed by: INTERNAL MEDICINE

## 2020-02-04 PROCEDURE — 92507 TX SP LANG VOICE COMM INDIV: CPT

## 2020-02-04 PROCEDURE — 83735 ASSAY OF MAGNESIUM: CPT | Performed by: INTERNAL MEDICINE

## 2020-02-04 PROCEDURE — 25010000003 CEFAZOLIN 1-4 GM/50ML-% SOLUTION: Performed by: SURGERY

## 2020-02-04 PROCEDURE — 99024 POSTOP FOLLOW-UP VISIT: CPT | Performed by: PHYSICIAN ASSISTANT

## 2020-02-04 PROCEDURE — 87040 BLOOD CULTURE FOR BACTERIA: CPT | Performed by: INTERNAL MEDICINE

## 2020-02-04 PROCEDURE — 97110 THERAPEUTIC EXERCISES: CPT

## 2020-02-04 PROCEDURE — 97530 THERAPEUTIC ACTIVITIES: CPT

## 2020-02-04 PROCEDURE — 94799 UNLISTED PULMONARY SVC/PX: CPT

## 2020-02-04 PROCEDURE — 92526 ORAL FUNCTION THERAPY: CPT

## 2020-02-04 PROCEDURE — 84100 ASSAY OF PHOSPHORUS: CPT | Performed by: INTERNAL MEDICINE

## 2020-02-04 PROCEDURE — 82962 GLUCOSE BLOOD TEST: CPT

## 2020-02-04 PROCEDURE — 80053 COMPREHEN METABOLIC PANEL: CPT | Performed by: INTERNAL MEDICINE

## 2020-02-04 PROCEDURE — 85025 COMPLETE CBC W/AUTO DIFF WBC: CPT | Performed by: INTERNAL MEDICINE

## 2020-02-04 RX ORDER — BISACODYL 10 MG
10 SUPPOSITORY, RECTAL RECTAL ONCE
Status: COMPLETED | OUTPATIENT
Start: 2020-02-04 | End: 2020-02-04

## 2020-02-04 RX ORDER — POTASSIUM CHLORIDE 1.5 G/1.77G
40 POWDER, FOR SOLUTION ORAL DAILY
Status: DISCONTINUED | OUTPATIENT
Start: 2020-02-05 | End: 2020-02-05

## 2020-02-04 RX ORDER — CARVEDILOL 12.5 MG/1
25 TABLET ORAL 2 TIMES DAILY WITH MEALS
Status: DISCONTINUED | OUTPATIENT
Start: 2020-02-04 | End: 2020-02-07 | Stop reason: HOSPADM

## 2020-02-04 RX ORDER — HEPARIN SODIUM 5000 [USP'U]/ML
5000 INJECTION, SOLUTION INTRAVENOUS; SUBCUTANEOUS EVERY 8 HOURS SCHEDULED
Status: DISCONTINUED | OUTPATIENT
Start: 2020-02-04 | End: 2020-02-07 | Stop reason: HOSPADM

## 2020-02-04 RX ORDER — DOCUSATE SODIUM 50 MG/5 ML
100 LIQUID (ML) ORAL 2 TIMES DAILY
Status: DISCONTINUED | OUTPATIENT
Start: 2020-02-04 | End: 2020-02-07 | Stop reason: HOSPADM

## 2020-02-04 RX ADMIN — DOCUSATE SODIUM 100 MG: 50 LIQUID ORAL at 11:29

## 2020-02-04 RX ADMIN — CEFAZOLIN SODIUM 1 G: 1 INJECTION, SOLUTION INTRAVENOUS at 22:33

## 2020-02-04 RX ADMIN — HEPARIN SODIUM 5000 UNITS: 5000 INJECTION INTRAVENOUS; SUBCUTANEOUS at 15:06

## 2020-02-04 RX ADMIN — IPRATROPIUM BROMIDE AND ALBUTEROL SULFATE 3 ML: 2.5; .5 SOLUTION RESPIRATORY (INHALATION) at 16:55

## 2020-02-04 RX ADMIN — IPRATROPIUM BROMIDE AND ALBUTEROL SULFATE 3 ML: 2.5; .5 SOLUTION RESPIRATORY (INHALATION) at 20:40

## 2020-02-04 RX ADMIN — POTASSIUM CHLORIDE 20 MEQ: 1.5 POWDER, FOR SOLUTION ORAL at 08:30

## 2020-02-04 RX ADMIN — CEFAZOLIN SODIUM 1 G: 1 INJECTION, SOLUTION INTRAVENOUS at 05:35

## 2020-02-04 RX ADMIN — BISACODYL 10 MG: 10 SUPPOSITORY RECTAL at 11:29

## 2020-02-04 RX ADMIN — SODIUM CHLORIDE 6 MG/HR: 9 INJECTION, SOLUTION INTRAVENOUS at 11:45

## 2020-02-04 RX ADMIN — SODIUM CHLORIDE 10 MG/HR: 9 INJECTION, SOLUTION INTRAVENOUS at 08:31

## 2020-02-04 RX ADMIN — SODIUM CHLORIDE, PRESERVATIVE FREE 10 ML: 5 INJECTION INTRAVENOUS at 08:33

## 2020-02-04 RX ADMIN — SODIUM CHLORIDE, PRESERVATIVE FREE 10 ML: 5 INJECTION INTRAVENOUS at 22:34

## 2020-02-04 RX ADMIN — LISINOPRIL 20 MG: 20 TABLET ORAL at 08:29

## 2020-02-04 RX ADMIN — SENNOSIDES 10 ML: 8.8 LIQUID ORAL at 11:45

## 2020-02-04 RX ADMIN — CARVEDILOL 25 MG: 12.5 TABLET, FILM COATED ORAL at 18:41

## 2020-02-04 RX ADMIN — CEFAZOLIN SODIUM 1 G: 1 INJECTION, SOLUTION INTRAVENOUS at 15:06

## 2020-02-04 RX ADMIN — CARVEDILOL 12.5 MG: 12.5 TABLET, FILM COATED ORAL at 08:31

## 2020-02-04 RX ADMIN — HEPARIN SODIUM 5000 UNITS: 5000 INJECTION INTRAVENOUS; SUBCUTANEOUS at 22:32

## 2020-02-04 RX ADMIN — IPRATROPIUM BROMIDE AND ALBUTEROL SULFATE 3 ML: 2.5; .5 SOLUTION RESPIRATORY (INHALATION) at 07:41

## 2020-02-04 RX ADMIN — PANTOPRAZOLE SODIUM 40 MG: 40 INJECTION, POWDER, FOR SOLUTION INTRAVENOUS at 08:30

## 2020-02-04 RX ADMIN — SODIUM CHLORIDE 2 MG/HR: 9 INJECTION, SOLUTION INTRAVENOUS at 17:56

## 2020-02-04 NOTE — PLAN OF CARE
Problem: Patient Care Overview  Goal: Plan of Care Review  Outcome: Ongoing (interventions implemented as appropriate)  Flowsheets (Taken 2/4/2020 0502)  Progress: no change  Plan of Care Reviewed With: patient  Note:   Patient with NIH 23, VSS, off cardene, tolerating PEG and TF, patient requiring I&O cath q 6 hours for urinary retention, patient with left sided neglect, left side flaccid, no undertandable speech, cooperating with staff, will continue to monitor

## 2020-02-04 NOTE — THERAPY TREATMENT NOTE
Acute Care - Occupational Therapy Treatment Note  [facility]     Patient Name: Yee Goodwin  : 1970  MRN: [id number]  Today's Date: 2020  Onset of Illness/Injury or Date of Surgery: 20  Date of Referral to OT: 20  Referring Physician: Dr. Cole    Admit Date: 2020       ICD-10-CM ICD-9-CM   1. Acute intracerebral hemorrhage (CMS/HCC) I61.9 431   2. L frontal ICH  I62.9 432.9   3. Intracranial hemorrhage (CMS/HCC) I62.9 432.9   4. Dysphagia, unspecified type R13.10 787.20   5. Aphasia R47.01 784.3   6. Apraxia R48.2 784.69   7. Impaired mobility and ADLs Z74.09 799.89     Patient Active Problem List   Diagnosis   • History of right hip replacement   • PUD (peptic ulcer disease)   • S/P madan   • Essential hypertension   • Gastritis   • JAYESH (obstructive sleep apnea)   • Epigastric pain   • Diarrhea   • Diverticulosis   • Hiatal hernia   • Tobacco abuse   • Acute L frontal ICH 20   • Hypertensive emergency   • Elevated transaminase levels   • Dyslipidemia   • Acute hypoxemic/hypercarbic respiratory failure due to ICH. Required Ventilatory support   • Acute intracerebral hemorrhage (CMS/HCC)   • S/P L frontal craniotomy and evacuation of intraparenchymal hematoma 20     Past Medical History:   Diagnosis Date   • Arthritis    • Diverticulitis    • GERD (gastroesophageal reflux disease)    • Hypertension      Past Surgical History:   Procedure Laterality Date   • CEREBRAL ANGIOGRAM N/A 2020    Procedure: Cerebral angiogram;  Surgeon: Merlin Black MD;  Location: [facility] INVASIVE LOCATION;  Service: Interventional Radiology   • CHOLECYSTECTOMY     • COLONOSCOPY     • CRANIOTOMY FOR SUBDURAL HEMATOMA Left 2020    Procedure: FRONTAL CRANIOTOMY FOR EVACUATION OF INTRAPARENCHYMAL HEMATOMA  LEFT;  Surgeon: Jose Cole MD;  Location: [facility] OR;  Service: Neurosurgery   • ENDOSCOPY W/ PEG TUBE PLACEMENT N/A 2/3/2020    Procedure:  ESOPHAGOGASTRODUODENOSCOPY WITH PERCUTANEOUS ENDOSCOPIC GASTROSTOMY TUBE INSERTION AT BEDSIDE;  Surgeon: Andrew Young MD;  Location: [facility] ENDOSCOPY;  Service: General;  Laterality: N/A;   • HAND TENDON SURGERY Right     patient hand right thumb tendon surgery   • HIP SURGERY     • JOINT REPLACEMENT     • TUBAL ABDOMINAL LIGATION         Therapy Treatment    Rehabilitation Treatment Summary     Row Name 02/04/20 0859             Treatment Time/Intention    Discipline  occupational therapist  -CL      Document Type  therapy note (daily note)  -CL      Subjective Information  complains of;pain  -CL      Patient Effort  good  -CL      Existing Precautions/Restrictions  fall;oxygen therapy device and L/min;other (see comments) R sided weakness/neglect, exp aphasia, PEG  -CL      Treatment Considerations/Comments  Pt responds using thumbs up/down  -CL      Recorded by [CL] Sabrina Badillo OT 02/04/20 0952      Row Name 02/04/20 0859             Vital Signs    Pre Systolic BP Rehab  100  -CL      Pre Treatment Diastolic BP  83  -CL      Post Systolic BP Rehab  117  -CL      Post Treatment Diastolic BP  70  -CL      Pretreatment Heart Rate (beats/min)  93  -CL      Posttreatment Heart Rate (beats/min)  86  -CL      Pre SpO2 (%)  94  -CL      O2 Delivery Pre Treatment  supplemental O2  -CL      Post SpO2 (%)  94  -CL      O2 Delivery Post Treatment  supplemental O2  -CL      Pre Patient Position  Supine  -CL      Intra Patient Position  Standing  -CL      Post Patient Position  Sitting  -CL      Recorded by [CL] Sabrina Badillo OT 02/04/20 0952      Row Name 02/04/20 0859             Cognitive Assessment/Intervention- PT/OT    Affect/Mental Status (Cognitive)  low arousal/lethargic  -CL      Orientation Status (Cognition)  oriented to;person  -CL      Follows Commands (Cognition)  follows one step commands;75-90% accuracy;repetition of directions required;verbal cues/prompting required;increased processing time needed   -CL      Safety Deficit (Cognitive)  moderate deficit;awareness of need for assistance;insight into deficits/self awareness;safety precautions awareness  -CL      Recorded by [CL] Sabrina Badillo, OT 02/04/20 0952      Row Name 02/04/20 0859             Bed Mobility Assessment/Treatment    Bed Mobility Assessment/Treatment  supine-sit  -CL      Supine-Sit [place] (Bed Mobility)  maximum assist (25% patient effort);2 person assist;verbal cues  -CL      Assistive Device (Bed Mobility)  bed rails;draw sheet;head of bed elevated  -CL      Recorded by [CL] Sabrina Badillo, OT 02/04/20 0952      Row Name 02/04/20 0859             Transfer Assessment/Treatment    Transfer Assessment/Treatment  bed-chair transfer;stand-sit transfer;sit-stand transfer  -CL      Comment (Transfers)  Pt performed SPT from bed/chair w/ BUE support and R knee buckling.   -CL      Recorded by [CL] Sabrina aBdillo, OT 02/04/20 0952      Row Name 02/04/20 0859             Bed-Chair Transfer    Bed-Chair [place] (Transfers)  maximum assist (25% patient effort);2 person assist;verbal cues  -CL      Recorded by [CL] Sabrina Badillo, OT 02/04/20 0952      Row Name 02/04/20 0859             Sit-Stand Transfer    Sit-Stand [place] (Transfers)  maximum assist (25% patient effort);2 person assist;verbal cues  -CL      Recorded by [CL] Sabrina Badillo, OT 02/04/20 0952      Row Name 02/04/20 0859             Stand-Sit Transfer    Stand-Sit [place] (Transfers)  maximum assist (25% patient effort);2 person assist;verbal cues  -CL      Recorded by [CL] Sabrina Badillo, OT 02/04/20 0952      Row Name 02/04/20 0859             Motor Skills Assessment/Interventions    Additional Documentation  Balance (Group);Therapeutic Exercise (Group)  -CL      Recorded by [CL] Sabrina Badillo, OT 02/04/20 0952      Row Name 02/04/20 0859             Therapeutic Exercise    Therapeutic Exercise  seated, upper extremities  -CL      Additional Documentation  Therapeutic Exercise  (Row)  -CL      50634 - OT Therapeutic Exercise Minutes  10  -CL      87948 - OT Therapeutic Activity Minutes  20  -CL      Recorded by [CL] Sabrina Badillo OT 02/04/20 0952      Row Name 02/04/20 0859             Upper Extremity Seated Therapeutic Exercise    Performed, Seated Upper Extremity (Therapeutic Exercise)  shoulder flexion/extension;elbow flexion/extension;digit flexion/extension;wrist flexion/extension  -CL      Exercise Type, Seated Upper Extremity (Therapeutic Exercise)  AROM (active range of motion);PROM (passive range of motion)  -CL      Sets/Reps Detail, Seated Upper Extremity (Therapeutic Exercise)  1/10  -CL      Comment, Seated Upper Extremity (Therapeutic Exercise)  BUE, pt performed SROM, family educated on HEP  -CL      Recorded by [CL] Sabrina Badillo OT 02/04/20 0952      Row Name 02/04/20 0859             Balance    Balance  static sitting balance;static standing balance  -CL      Recorded by [CL] Sabrina Badillo OT 02/04/20 0952      Row Name 02/04/20 0859             Static Sitting Balance    Level of [place] (Unsupported Sitting, Static Balance)  maximal assist, 25 to 49% patient effort  -CL      Sitting Position (Unsupported Sitting, Static Balance)  sitting on edge of bed  -CL      Comment (Unsupported Sitting, Static Balance)  Pt w/ posterior and R lateral LOB, progressed to moments of Min A  -CL      Recorded by [CL] Sabrina Badillo OT 02/04/20 0952      Row Name 02/04/20 0859             Static Standing Balance    Level of [place] (Supported Standing, Static Balance)  maximal assist, 25 to 49% patient effort;2 person assist  -CL      Recorded by [CL] Sabrina Badillo OT 02/04/20 0952      Row Name 02/04/20 0859             Positioning and Restraints    Pre-Treatment Position  in bed  -CL      Post Treatment Position  chair  -CL      In Chair  notified nsg;reclined;call light within reach;encouraged to call for assist;exit alarm on;RUE elevated;LUE elevated;waffle cushion;on  mechanical lift sling;legs elevated;heels elevated  -CL      Recorded by [CL] Sabrina Badillo, OT 02/04/20 0952      Row Name 02/04/20 0859             Pain Assessment    Additional Documentation  Pain Scale 2: FACES Pre/Post-Treatment (Group)  -CL      Recorded by [CL] Sabrina Badillo, OT 02/04/20 0952      Row Name 02/04/20 0859             Pain Scale 2: FACES Pre/Post-Treatment    Pain 2: FACES Scale, Pretreatment  2-->hurts little bit  -CL      Pain 2: FACES Scale, Post-Treatment  2-->hurts little bit  -CL      Pain Location 2 - Orientation  generalized  -CL      Pre/Post Treatment Pain 2 Comment  tolerated  -CL      Pain Intervention(s) 2  Repositioned;Ambulation/increased activity  -CL      Recorded by [CL] Sabrina Badillo, OT 02/04/20 0952      Row Name                Wound 01/29/20 0915 Right anterior groin Puncture    Wound - Properties Group Date first assessed: 01/29/20 [SM] Time first assessed: 0915 [SM] Side: Right [SM] Orientation: anterior [SM] Location: groin [SM] Primary Wound Type: Puncture [SM] Additional Comments: Angioseal [SM] Recorded by:  [SM] Dayana Avila RN 01/29/20 1259    Row Name                Wound 01/30/20 1038 head Incision    Wound - Properties Group Date first assessed: 01/30/20 [TK] Time first assessed: 1038 [TK] Present on Hospital Admission: N [TK] Location: head [TK] Primary Wound Type: Incision [TK] Recorded by:  [TK] Rachel Jaime RN 01/30/20 1038      User Key  (r) = Recorded By, (t) = Taken By, (c) = Cosigned By    Initials Name Effective Dates Discipline    TK Rachel Jaime RN 06/16/16 -  Nurse    Dayana Dodge RN 11/04/16 -  Nurse    CL Sabrina Badillo OT 04/03/18 -  OT        Wound 01/29/20 0915 Right anterior groin Puncture (Active)   Dressing Appearance dry;intact 2/4/2020  8:00 AM   Drainage Amount none 2/4/2020  8:00 AM       Wound 01/30/20 1038 head Incision (Active)   Dressing Appearance dry;intact 2/4/2020  8:00 AM   Closure DELFINA 2/4/2020  8:00 AM   Drainage Amount  none 2/4/2020  8:00 AM           OT Recommendation and Plan     Plan of Care Review  Plan of Care Reviewed With: patient  Plan of Care Reviewed With: patient  Outcome Summary: Pt Max Ax2 for SPT from bed/chair. Pt conts to be limited d/t R sided weakness/neglect, educated pt/family on BUE HEP and compensatory stratgies to impove attention to R side. Recommend cont skilled IPOT OC. Recommend pt DC to IP rehab.  Outcome Measures     Row Name 02/04/20 0859             How much help from another is currently needed...    Putting on and taking off regular lower body clothing?  1  -CL      Bathing (including washing, rinsing, and drying)  1  -CL      Toileting (which includes using toilet bed pan or urinal)  1  -CL      Putting on and taking off regular upper body clothing  2  -CL      Taking care of personal grooming (such as brushing teeth)  2  -CL      Eating meals  1  -CL      AM-PAC 6 Clicks Score (OT)  8  -CL         Modified Rachell Scale    Pre-Stroke Modified Rachell Scale  0 - No Symptoms at all.  -CL      Modified Marin Scale  4 - Moderately severe disability.  Unable to walk without assistance, and unable to attend to own bodily needs without assistance.  -CL         Functional Assessment    Outcome Measure Options  AM-PAC 6 Clicks Daily Activity (OT)  -CL        User Key  (r) = Recorded By, (t) = Taken By, (c) = Cosigned By    Initials Name Provider Type    Sabrina Grant OT Occupational Therapist           Time Calculation:   Time Calculation- OT     Row Name 02/04/20 0859             Time Calculation- OT    OT Start Time  0859  -CL      OT Received On  02/04/20  -CL      OT Goal Re-Cert Due Date  02/11/20  -CL         Timed Charges    97011 - OT Therapeutic Exercise Minutes  10  -CL      70683 - OT Therapeutic Activity Minutes  20  -CL        User Key  (r) = Recorded By, (t) = Taken By, (c) = Cosigned By    Initials Name Provider Type    Sabrina Grant OT Occupational Therapist        Therapy Charges for  Today     Code Description Service Date Service Provider Modifiers Qty    73648358945 HC OT THER PROC EA 15 MIN 2/4/2020 Sabrina Badillo OT GO 1    63840474186 HC OT THERAPEUTIC ACT EA 15 MIN 2/4/2020 Sabrina Badillo OT GO 1    67053763747 HC OT THER SUPP EA 15 MIN 2/4/2020 Sabrina Badillo OT GO 2               Sabrina Badillo OT  2/4/2020

## 2020-02-04 NOTE — PROGRESS NOTES
"NEUROSURGERY PROGRESS NOTE    Interval History:   Hospital day 7 postoperative day 5 status post left frontal craniotomy for evacuation of intraparenchymal hematoma. No events overnight.     Vital Signs  Blood pressure 150/88, pulse 100, temperature 98.9 °F (37.2 °C), temperature source Axillary, resp. rate 20, height 167.6 cm (66\"), weight 66.2 kg (145 lb 15.1 oz), SpO2 96 %.    Physical Exam:  Patient is awake  Left sided neglect. Flaccid left upper and lower extremity   Answers questions with thumbs up for yes   Follows commands in right upper and lower extremity      Results Review:    I/O last 3 completed shifts:  In: 2560.3 [I.V.:1821.3; Other:20; NG/GT:569; IV Piggyback:150]  Out: 2885 [Urine:2885]  No intake/output data recorded.    Results from last 7 days   Lab Units 02/04/20  0553 02/03/20  1642 02/03/20  0418 02/02/20  0522 02/01/20  0354   SODIUM mmol/L 131* 128* 125* 127* 131*   POTASSIUM mmol/L 3.7  --  3.8 3.8 3.4*   CHLORIDE mmol/L 91*  --  86* 88* 93*   CO2 mmol/L 27.0  --  22.0 25.0 24.0   BUN mg/dL 15  --  12 13 10   CREATININE mg/dL 0.37*  --  0.32* 0.32* 0.35*   CALCIUM mg/dL 8.9  --  9.1 9.4 9.2   BILIRUBIN mg/dL 0.4  --  0.6  --  0.6   ALK PHOS U/L 128*  --  117  --  120*   ALT (SGPT) U/L 77*  --  94*  --  174*   AST (SGOT) U/L 30  --  34*  --  74*   GLUCOSE mg/dL 157*  --  111* 143* 113*       Assessment/Plan:   S/p left frontal craniotomy   Sodium has improved. Continue to monitor   Patient may be transferred to floor from neurosurgical standpoint when medically ready     Kathy Del Real PA-C  02/04/20  7:28 AM    "

## 2020-02-04 NOTE — PROGRESS NOTES
"Patient Name:  Yee Goodwin  YOB: 1970  [id number]    Surgery Progress Note    Date of visit: 2/4/2020    Subjective   Subjective: Stable overnight, tolerating TF         Objective     Objective:     /88 (BP Location: Left arm)   Pulse 100   Temp 98.9 °F (37.2 °C) (Axillary)   Resp 20   Ht 167.6 cm (66\")   Wt 66.2 kg (145 lb 15.1 oz)   LMP  (LMP Unknown)   SpO2 96%   BMI 23.56 kg/m²     Intake/Output Summary (Last 24 hours) at 2/4/2020 0657  Last data filed at 2/4/2020 0600  Gross per 24 hour   Intake 1099.87 ml   Output 1850 ml   Net -750.13 ml       CV:  Rhythm  regular and rate regular   L:  Rhonchi to auscultation bilaterally   Abd:  Bowel sounds positive , soft, PEG c/d/i  Ext:  No cyanosis, clubbing, edema    Recent labs that are back at this time have been reviewed.        Assessment/Plan     Assessment/ Plan:    Hospital Problem List     * (Principal) Acute L frontal ICH 1/28/20 - Doing well after PEG. Will sign off. Please call with questions.      JAYESH (obstructive sleep apnea)    Tobacco abuse    Hypertensive emergency        Elevated transaminase levels    Dyslipidemia    Acute hypoxemic/hypercarbic respiratory failure due to ICH. Required Ventilatory support    S/P L frontal craniotomy and evacuation of intraparenchymal hematoma 1/30/20              Andrew Young MD  2/4/2020  6:57 AM      "

## 2020-02-04 NOTE — PLAN OF CARE
Problem: Stroke (Hemorrhagic) (Adult)  Goal: Signs and Symptoms of Listed Potential Problems Will be Absent, Minimized or Managed (Stroke)  Outcome: Ongoing (interventions implemented as appropriate)  Flowsheets (Taken 2/4/2020 1726)  Problems Assessed (Stroke (Hemorrhagic)): acute neurologic deterioration; bladder/bowel dysfunction; cognitive impairment; eating/swallowing impairment     Problem: Skin Injury Risk (Adult)  Goal: Skin Health and Integrity  Outcome: Ongoing (interventions implemented as appropriate)  Flowsheets (Taken 2/4/2020 1726)  Skin Health and Integrity: making progress toward outcome

## 2020-02-04 NOTE — THERAPY TREATMENT NOTE
Acute Care - Speech Language Pathology NICU/PEDS Progress Note  [facility]       Patient Name: Yee Goodwin  : 1970  MRN: [id number]  Today's Date: 2020  Onset of Illness/Injury or Date of Surgery: 20        Referring Physician: Dr. Cole       Admit Date: 2020      Visit Dx:      ICD-10-CM ICD-9-CM   1. Acute intracerebral hemorrhage (CMS/HCC) I61.9 431   2. L frontal ICH  I62.9 432.9   3. Intracranial hemorrhage (CMS/HCC) I62.9 432.9   4. Dysphagia, unspecified type R13.10 787.20   5. Aphasia R47.01 784.3   6. Apraxia R48.2 784.69   7. Impaired mobility and ADLs Z74.09 799.89       Patient Active Problem List   Diagnosis   • History of right hip replacement   • PUD (peptic ulcer disease)   • S/P madan   • Essential hypertension   • Gastritis   • JAYESH (obstructive sleep apnea)   • Epigastric pain   • Diarrhea   • Diverticulosis   • Hiatal hernia   • Tobacco abuse   • Acute L frontal ICH 20   • Hypertensive emergency   • Elevated transaminase levels   • Dyslipidemia   • Acute hypoxemic/hypercarbic respiratory failure due to ICH. Required Ventilatory support   • Acute intracerebral hemorrhage (CMS/HCC)   • S/P L frontal craniotomy and evacuation of intraparenchymal hematoma 20                Therapy Treatment  Rehabilitation Treatment Summary     Row Name 20 1100 20 0859          Treatment Time/Intention    Discipline  speech language pathologist  -AV  occupational therapist  -CL     Document Type  therapy note (daily note)  -AV  therapy note (daily note)  -CL     Subjective Information  no complaints  -AV  complains of;pain  -CL     Mode of Treatment  speech-language pathology  -AV  --     Patient/Family Observations  spouse present   -AV  --     Care Plan Review  care plan/treatment goals reviewed  -AV  --     Patient Effort  good  -AV  good  -CL     Existing Precautions/Restrictions  --  fall;oxygen therapy device and L/min;other (see comments) R sided  weakness/neglect, exp aphasia, PEG  -CL     Treatment Considerations/Comments  --  Pt responds using thumbs up/down  -CL     Recorded by [AV] Wandy Wade, MS CCC-SLP 02/04/20 1150 [CL] Sabrina Badillo OT 02/04/20 0952     Row Name 02/04/20 0859             Vital Signs    Pre Systolic BP Rehab  100  -CL      Pre Treatment Diastolic BP  83  -CL      Post Systolic BP Rehab  117  -CL      Post Treatment Diastolic BP  70  -CL      Pretreatment Heart Rate (beats/min)  93  -CL      Posttreatment Heart Rate (beats/min)  86  -CL      Pre SpO2 (%)  94  -CL      O2 Delivery Pre Treatment  supplemental O2  -CL      Post SpO2 (%)  94  -CL      O2 Delivery Post Treatment  supplemental O2  -CL      Pre Patient Position  Supine  -CL      Intra Patient Position  Standing  -CL      Post Patient Position  Sitting  -CL      Recorded by [CL] Sabrina Badillo OT 02/04/20 0952      Row Name 02/04/20 0859             Cognitive Assessment/Intervention- PT/OT    Affect/Mental Status (Cognitive)  low arousal/lethargic  -CL      Orientation Status (Cognition)  oriented to;person  -CL      Follows Commands (Cognition)  follows one step commands;75-90% accuracy;repetition of directions required;verbal cues/prompting required;increased processing time needed  -CL      Safety Deficit (Cognitive)  moderate deficit;awareness of need for assistance;insight into deficits/self awareness;safety precautions awareness  -CL      Recorded by [CL] Sabrina Badillo OT 02/04/20 0952      Row Name 02/04/20 0859             Bed Mobility Assessment/Treatment    Bed Mobility Assessment/Treatment  supine-sit  -CL      Supine-Sit [place] (Bed Mobility)  maximum assist (25% patient effort);2 person assist;verbal cues  -CL      Assistive Device (Bed Mobility)  bed rails;draw sheet;head of bed elevated  -CL      Recorded by [CL] Sabrina Badillo OT 02/04/20 0952      Row Name 02/04/20 0859             Transfer Assessment/Treatment    Transfer  Assessment/Treatment  bed-chair transfer;stand-sit transfer;sit-stand transfer  -CL      Comment (Transfers)  Pt performed SPT from bed/chair w/ BUE support and R knee buckling.   -CL      Recorded by [CL] Sabrina Badillo, OT 02/04/20 0952      Row Name 02/04/20 0859             Bed-Chair Transfer    Bed-Chair [place] (Transfers)  maximum assist (25% patient effort);2 person assist;verbal cues  -CL      Recorded by [CL] Sabrina Badillo OT 02/04/20 0952      Row Name 02/04/20 0859             Sit-Stand Transfer    Sit-Stand [place] (Transfers)  maximum assist (25% patient effort);2 person assist;verbal cues  -CL      Recorded by [CL] Sabrina Badillo OT 02/04/20 0952      Row Name 02/04/20 0859             Stand-Sit Transfer    Stand-Sit [place] (Transfers)  maximum assist (25% patient effort);2 person assist;verbal cues  -CL      Recorded by [CL] Sabrina Badillo OT 02/04/20 0952      Row Name 02/04/20 0859             Motor Skills Assessment/Interventions    Additional Documentation  Balance (Group);Therapeutic Exercise (Group)  -CL      Recorded by [CL] Sabrina Badillo OT 02/04/20 0952      Row Name 02/04/20 0859             Therapeutic Exercise    Therapeutic Exercise  seated, upper extremities  -CL      Additional Documentation  Therapeutic Exercise (Row)  -CL      66209 - OT Therapeutic Exercise Minutes  10  -CL      29850 - OT Therapeutic Activity Minutes  20  -CL      Recorded by [CL] Sabrina Badillo OT 02/04/20 0952      Row Name 02/04/20 0859             Upper Extremity Seated Therapeutic Exercise    Performed, Seated Upper Extremity (Therapeutic Exercise)  shoulder flexion/extension;elbow flexion/extension;digit flexion/extension;wrist flexion/extension  -CL      Exercise Type, Seated Upper Extremity (Therapeutic Exercise)  AROM (active range of motion);PROM (passive range of motion)  -CL      Sets/Reps Detail, Seated Upper Extremity (Therapeutic Exercise)  1/10  -CL      Comment, Seated Upper Extremity  (Therapeutic Exercise)  BUE, pt performed SROM, family educated on HEP  -CL      Recorded by [CL] Sabrina Badillo, OT 02/04/20 0952      Row Name 02/04/20 0859             Balance    Balance  static sitting balance;static standing balance  -CL      Recorded by [CL] Sabrina Badillo, OT 02/04/20 0952      Row Name 02/04/20 0859             Static Sitting Balance    Level of [place] (Unsupported Sitting, Static Balance)  maximal assist, 25 to 49% patient effort  -CL      Sitting Position (Unsupported Sitting, Static Balance)  sitting on edge of bed  -CL      Comment (Unsupported Sitting, Static Balance)  Pt w/ posterior and R lateral LOB, progressed to moments of Min A  -CL      Recorded by [CL] Sabrina Badillo, OT 02/04/20 0952      Row Name 02/04/20 0859             Static Standing Balance    Level of [place] (Supported Standing, Static Balance)  maximal assist, 25 to 49% patient effort;2 person assist  -CL      Recorded by [CL] Sabrina Badillo, OT 02/04/20 0952      Row Name 02/04/20 0859             Positioning and Restraints    Pre-Treatment Position  in bed  -CL      Post Treatment Position  chair  -CL      In Chair  notified nsg;reclined;call light within reach;encouraged to call for assist;exit alarm on;RUE elevated;LUE elevated;waffle cushion;on mechanical lift sling;legs elevated;heels elevated  -CL      Recorded by [CL] Sabrina Badillo, OT 02/04/20 0952      Row Name 02/04/20 1100 02/04/20 0859          Pain Assessment    Additional Documentation  Pain Scale: FACES Pre/Post-Treatment (Group)  -AV  Pain Scale 2: FACES Pre/Post-Treatment (Group)  -CL     Recorded by [AV] Wandy Wade MS CCC-SLP 02/04/20 1150 [CL] Sabrina Badillo, OT 02/04/20 0952     Row Name 02/04/20 1100             Pain Scale: FACES Pre/Post-Treatment    Pain: FACES Scale, Pretreatment  0-->no hurt  -AV      Pain: FACES Scale, Post-Treatment  0-->no hurt  -AV      Recorded by [AV] Wandy Wade MS CCC-SLP 02/04/20 1150       Row Name 02/04/20 0859             Pain Scale 2: FACES Pre/Post-Treatment    Pain 2: FACES Scale, Pretreatment  2-->hurts little bit  -CL      Pain 2: FACES Scale, Post-Treatment  2-->hurts little bit  -CL      Pain Location 2 - Orientation  generalized  -CL      Pre/Post Treatment Pain 2 Comment  tolerated  -CL      Pain Intervention(s) 2  Repositioned;Ambulation/increased activity  -CL      Recorded by [CL] Sabrina Badillo, OT 02/04/20 0952      Row Name                Wound 01/29/20 0915 Right anterior groin Puncture    Wound - Properties Group Date first assessed: 01/29/20 [SM] Time first assessed: 0915 [SM] Side: Right [SM] Orientation: anterior [SM] Location: groin [SM] Primary Wound Type: Puncture [SM] Additional Comments: Angioseal [SM] Recorded by:  [SM] Dayana Avila RN 01/29/20 1259    Row Name                Wound 01/30/20 1038 head Incision    Wound - Properties Group Date first assessed: 01/30/20 [TK] Time first assessed: 1038 [TK] Present on Hospital Admission: N [TK] Location: head [TK] Primary Wound Type: Incision [TK] Recorded by:  [TK] Rachel Jaime RN 01/30/20 1038    Row Name 02/04/20 1100             Outcome Summary/Treatment Plan (SLP)    Daily Summary of Progress (SLP)  progress toward functional goals as expected  -AV      Plan for Continued Treatment (SLP)  no overt s/s with ice chips. responded well to ice trials and engaged on right side.  Ok'd RN for intermittent ice after oral care.   -AV      Anticipated Dischage Disposition  inpatient rehabilitation facility;anticipate therapy at next level of care  -AV      Recorded by [AV] Wandy Wade, MS CCC-SLP 02/04/20 1150        User Key  (r) = Recorded By, (t) = Taken By, (c) = Cosigned By    Initials Name Effective Dates Discipline    TK Rachel Jaime RN 06/16/16 -  Nurse    AV Wandy Wade, MS CCC-SLP 05/23/19 -  SLP    SM Dayana Avila RN 11/04/16 -  Nurse    CL Sabrina Badillo, OT 04/03/18 -  OT          SLP GOALS      Row Name 02/04/20 1100 02/02/20 1100          Oral Nutrition/Hydration Goal 1 (SLP)    Oral Nutrition/Hydration Goal 1, SLP  LTG: Improve swallow function until can participate in instrumental swallow study without cues  -AV  LTG: Improve swallow function until can participate in instrumental swallow study without cues  -VO     Time Frame (Oral Nutrition/Hydration Goal 1, SLP)  by discharge  -AV  by discharge  -VO     Progress/Outcomes (Oral Nutrition/Hydration Goal 1, SLP)  continuing progress toward goal  -AV  continuing progress toward goal  -VO        Oral Nutrition/Hydration Goal 2 (SLP)    Oral Nutrition/Hydration Goal 2, SLP  Tolerate SLP PO trials without s/s aspiration with 50% accuracy and cues   -AV  Tolerate SLP PO trials without s/s aspiration with 50% accuracy and cues   -VO     Time Frame (Oral Nutrition/Hydration Goal 2, SLP)  short term goal (STG)  -AV  short term goal (STG)  -VO     Barriers (Oral Nutrition/Hydration Goal 2, SLP)  took several trials of ice with no overt s/s. Cough with thins.   -AV  --     Progress/Outcomes (Oral Nutrition/Hydration Goal 2, SLP)  continuing progress toward goal  -AV  continuing progress toward goal  -VO        Labial Strengthening Goal 1 (SLP)    Activity (Labial Strengthening Goal 1, SLP)  increase labial tone  -AV  increase labial tone  -VO     Increase Labial Tone  labial resistance exercises  -AV  labial resistance exercises  -VO     [place]/Accuracy (Labial Strengthening Goal 1, SLP)  with minimal cues (75-90% accuracy)  -AV  with minimal cues (75-90% accuracy)  -VO     Time Frame (Labial Strengthening Goal 1, SLP)  short term goal (STG)  -AV  short term goal (STG)  -VO     Progress/Outcomes (Labial Strengthening Goal 1, SLP)  continuing progress toward goal  -AV  continuing progress toward goal  -VO        Lingual Strengthening Goal 1 (SLP)    Activity (Lingual Strengthening Goal 1, SLP)  increase lingual tone/sensation/control/coordination/movement   -AV  increase lingual tone/sensation/control/coordination/movement  -VO     Increase Lingual Tone/Sensation/Control/Coordination/Movement  lingual movement exercises;lingual resistance exercises  -AV  lingual movement exercises;lingual resistance exercises  -VO     [place]/Accuracy (Lingual Strengthening Goal 1, SLP)  with minimal cues (75-90% accuracy)  -AV  with minimal cues (75-90% accuracy)  -VO     Time Frame (Lingual Strengthening Goal 1, SLP)  short term goal (STG)  -AV  short term goal (STG)  -VO     Progress/Outcomes (Lingual Strengthening Goal 1, SLP)  continuing progress toward goal  -AV  continuing progress toward goal  -VO        Pharyngeal Strengthening Exercise Goal 1 (SLP)    Activity (Pharyngeal Strengthening Goal 1, SLP)  --  increase pharyngeal sensation;increase timing;increase anterior movement of the hyolaryngeal complex;increase closure at entrance to airway/closure of airway at glottis;increase squeeze/positive pressure generation  -VO     Increase Pharyngeal Sensation  --  gustatory stimulation (sour/cold)  -VO     Increase Timing  --  prepping - 3 second prep or suck swallow or 3-step swallow  -VO     Increase Anterior Movement of the Hyolaryngeal Complex  --  chin tuck against resistance (CTAR)  -VO     Increase Closure at Entrance to Airway/Closure of Airway at Glottis  --  breath hold exercises  -VO     Increase Squeeze/Positive Pressure Generation  --  hard effortful swallow  -VO     [place]/Accuracy (Pharyngeal Strengthening Goal 1, SLP)  --  with minimal cues (75-90% accuracy)  -VO     Time Frame (Pharyngeal Strengthening Goal 1, SLP)  --  short term goal (STG)  -VO     Progress/Outcomes (Pharyngeal Strengthening Goal 1, SLP)  --  continuing progress toward goal  -VO        Comprehend Questions Goal 1 (SLP)    Improve Ability to Comprehend Questions Goal 1 (SLP)  simple yes/no questions;100%;with minimal cues (75-90%)  -AV  --     Time Frame (Comprehend Questions Goal 1,  SLP)  short term goal (STG)  -AV  --     Progress (Ability to Comprehend Questions Goal 1, SLP)  50%;with minimal cues (75-90%)  -AV  --     Progress/Outcomes (Comprehend Questions Goal 1, SLP)  continuing progress toward goal  -AV  --        Follow Directions Goal 2 (SLP)    Improve Ability to Follow Directions Goal 1 (SLP)  1 step direction without objects;60%;with moderate cues (50-74%)  -AV  --     Time Frame (Follow Directions Goal 1, SLP)  short term goal (STG);by discharge  -AV  --     Progress (Ability to Follow Directions Goal 1, SLP)  60%;with minimal cues (75-90%)  -AV  --     Progress/Outcomes (Follow Directions Goal 1, SLP)  continuing progress toward goal  -AV  --       User Key  (r) = Recorded By, (t) = Taken By, (c) = Cosigned By    Initials Name Provider Type    Wandy Harris MS CCC-SLP Speech and Language Pathologist    Isa Hitchcock MA,CCC-SLP Speech and Language Pathologist          EDUCATION  The patient has been educated in the following areas:   Dysphagia (Swallowing Impairment).      SLP Recommendation and Plan                                  Progress: improving   Plan for Continued Treatment (SLP): no overt s/s with ice chips. responded well to ice trials and engaged on right side.  Ok'd RN for intermittent ice after oral care.   Plan of Care Review  Plan of Care Reviewed With: patient, spouse, other (see comments)  Daily Summary of Progress (SLP): progress toward functional goals as expected  Plan of Care Reviewed With: patient, spouse, other (see comments)             Time Calculation:   Time Calculation- SLP     Row Name 02/04/20 1150             Time Calculation- SLP    SLP Start Time  1030  -AV      SLP Received On  02/04/20  -AV        User Key  (r) = Recorded By, (t) = Taken By, (c) = Cosigned By    Initials Name Provider Type    Wandy Harris MS CCC-SLP Speech and Language Pathologist             Therapy Charges for Today     Code Description  Service Date Service Provider Modifiers Qty    65717426544 HC ST TREATMENT SWALLOW 2 2/4/2020 Wandy Wade, MS CCC-SLP GN 1    [id number]  ST TREATMENT SPEECH 2 2/4/2020 Wandy Wade, MS CCC-SLP GN 1                    Wandy Cerrato, MS LISA-SLP  2/4/2020

## 2020-02-04 NOTE — PLAN OF CARE
Problem: Patient Care Overview  Goal: Plan of Care Review  Outcome: Ongoing (interventions implemented as appropriate)  Flowsheets (Taken 2/4/2020 1852)  Plan of Care Reviewed With: patient; significant other   Patient remains mute. Will follow simple command with left arm/leg. Patient unable to spontaneously move right side. Patient with significant right visual neglect. Has been ST throughout day, weaned Cardene off. TF changed today due to difficulties with bowel movements. Ryan placed due to acute retention.

## 2020-02-04 NOTE — PLAN OF CARE
Problem: Patient Care Overview  Goal: Plan of Care Review  Outcome: Ongoing (interventions implemented as appropriate)  Flowsheets (Taken 2/4/2020 1412)  Outcome Summary: Patient was able to improve activity tolerance this session. She  completed supine<>sit with Max A x2 and practiced weight shifting to/from midline for sitting balance. Max A x2 and BUE support for STS and practiced weight shifting in standing with R knee blocked. Patient able to maintain standing for 20 secs ea for 2 reps of standing. Continue to progress as appropriate.

## 2020-02-04 NOTE — PROGRESS NOTES
INTENSIVIST / PULMONARY FOLLOW UP NOTE     Hospital:  LOS: 7 days   Ms. Yee Goodwin, 49 y.o. female is followed for:     Acute L frontal ICH 1/28/20    JAYESH (obstructive sleep apnea)    Tobacco abuse    Hypertensive emergency    Elevated transaminase levels    Dyslipidemia    Acute hypoxemic/hypercarbic respiratory failure due to ICH. Required Ventilatory support    S/P L frontal craniotomy and evacuation of intraparenchymal hematoma 1/30/20          SUBJECTIVE   More alert this morning    The patient's relevant past medical, surgical, family, and social history were reviewed    Allergies and medications were reviewed    ROS:  Per subjective, all other systems were reviewed and were negative        OBJECTIVE     Vital Sign Min/Max for last 24 hours:  Temp  Min: 97.5 °F (36.4 °C)  Max: 100.1 °F (37.8 °C)   BP  Min: 97/71  Max: 154/87   Pulse  Min: 76  Max: 113   Resp  Min: 16  Max: 20   SpO2  Min: 92 %  Max: 99 %   No data recorded     Physical Exam:  General Appearance:  Alert, no distress  Eyes:  No scleral icterus or pallor, pupils normal  Ears, Nose, Mouth, Throat:  Atraumatic, oropharynx clear  Neck:  Trachea midline, thyroid normal  Respiratory:  Clear to auscultation bilaterally, normal effort, no tenderness to palpation  Cardiovascular:  Regular rate and rhythm, no murmurs, no peripheral edema, no thrill  Gastrointestinal:  Soft, non-tender, non-distended, no hepatosplenomegaly  Skin:  Normal temperature, no rash  Psychiatric:  No agitation  Neuro:  Interval: (shift change)  1a. Level of Consciousness: 0-->Alert, keenly responsive  1b. LOC Questions: 2-->Answers neither question correctly  1c. LOC Commands: 0-->Performs both tasks correctly  2. Best Gaze: 1-->Partial gaze palsy, gaze is abnormal in one or both eyes, but forced deviation or total gaze paresis is not present  3. Visual: 1-->Partial hemianopia  4. Facial Palsy: 2-->Partial paralysis (total or near-total paralysis of lower face)  5a. Motor Arm,  Left: 0-->No drift, limb holds 90 (or 45) degrees for full 10 secs  5b. Motor Arm, Right: 4-->No movement  6a. Motor Leg, Left: 0-->No drift, leg holds 30 degree position for full 5 secs  6b. Motor Leg, Right: 4-->No movement  7. Limb Ataxia: 0-->Absent  8. Sensory: 1-->Mild-to-moderate sensory loss, patient feels pinprick is less sharp or is dull on the affected side, or there is a loss of superficial pain with pinprick, but patient is aware of being touched  9. Best Language: 3-->Mute, global aphasia, no usable speech or auditory comprehension  10. Dysarthria: 2-->Severe dysarthria, patients speech is so slurred as to be unintelligible in the absence of or out of proportion to any dysphasia, or is mute/anarthric  11. Extinction and Inattention (formerly Neglect): 1-->Visual, tactile, auditory, spatial, or personal inattention or extinction to bilateral simultaneous stimulation in one of the sensory modalities    Total (NIH Stroke Scale): 21      Telemetry:              Hemodynamics:   CVP:     PAP:     PAOP:     CO:     CI:     SVI:     SVR:       SpO2: 93 % SpO2  Min: 92 %  Max: 99 %   Device:      Flow Rate:   No data recorded       Intake/Ouptut 24 hrs (7:00AM - 6:59 AM)  Intake & Output (last 3 days)       02/01 0701 - 02/02 0700 02/02 0701 - 02/03 0700 02/03 0701 - 02/04 0700 02/04 0701 - 02/05 0700    I.V. (mL/kg) 2286 (31.8) 1912.6 (28.2) 598.9 (9) 266 (4)    Other 213 20      NG/ 168 401 150    IV Piggyback 60.2 50 100     Total Intake(mL/kg) 3117.2 (43.4) 2150.6 (31.7) 1099.9 (16.6) 416 (6.3)    Urine (mL/kg/hr) 3510 (2) 1995 (1.2) 1850 (1.2)     Total Output 3510 1995 1850     Net -392.8 +155.6 -750.1 +416                  Lines, Drains & Airways    Active LDAs     Name:   Placement date:   Placement time:   Site:   Days:    Peripheral IV 02/01/20 1200 Anterior;Right;Upper Arm   02/01/20    1200    Arm   2    Peripheral IV 02/01/20 1215 Anterior;Right;Upper Arm   02/01/20    1215    Arm   2     Gastrostomy/Enterostomy Percutaneous endoscopic gastrostomy (PEG) LUQ   02/03/20    0750    LUQ   less than 1    External Urinary Catheter   02/02/20    2202    --   less than 1                Hematology:  Results from last 7 days   Lab Units 02/04/20  0553 02/03/20  0418 02/02/20  0522 02/01/20  0354 01/31/20  0444 01/30/20  0439 01/29/20  0644   WBC 10*3/mm3 13.25* 16.90* 17.75* 17.15* 13.83* 12.66* 14.41*   HEMOGLOBIN g/dL 12.1 12.4 13.0 12.3 11.3* 11.9* 12.7   HEMATOCRIT % 35.6 35.9 38.9 38.5 34.3 36.2 39.3   PLATELETS 10*3/mm3 368 346 391 372 267 260 327     Electrolytes, Magnesium and Phosphorus:  Results from last 7 days   Lab Units 02/04/20  0553 02/03/20  1642 02/03/20  0418 02/02/20  0522 02/01/20  0354 02/01/20  0001 01/31/20  0444 01/30/20  1913  01/30/20  0439  01/29/20  0644 01/28/20  1720   SODIUM mmol/L 131* 128* 125* 127* 131*  --  142  --   --  131*  --  133* 141   CHLORIDE mmol/L 91*  --  86* 88* 93*  --  103  --   --  93*  --  97* 100   POTASSIUM mmol/L 3.7  --  3.8 3.8 3.4* 3.5 3.6 4.0  --  3.3*   < > 4.3 3.3*   CO2 mmol/L 27.0  --  22.0 25.0 24.0  --  26.0  --   --  25.0  --  22.0 25.0   MAGNESIUM mg/dL 2.3  --  2.3  --  2.2  --  2.9*  --   --  2.4  --  2.6 1.7   PHOSPHORUS mg/dL 2.8  --  2.9 2.7 2.4* 2.2* 1.5* 1.5*   < > 2.2*   < > 1.8*  --     < > = values in this interval not displayed.     Renal:  Results from last 7 days   Lab Units 02/04/20  0553 02/03/20  0418 02/02/20  0522 02/01/20  0354 01/31/20  0444 01/30/20  0439 01/29/20  0644   CREATININE mg/dL 0.37* 0.32* 0.32* 0.35* 0.43* 0.40* 0.49*   BUN mg/dL 15 12 13 10 15 9 11     Estimated Creatinine Clearance: 188 mL/min (A) (by C-G formula based on SCr of 0.37 mg/dL (L)).  Hepatic:  Results from last 7 days   Lab Units 02/04/20  0553 02/03/20  0418 02/01/20  0354 01/31/20  0444 01/29/20  0644 01/28/20  1720   ALK PHOS U/L 128* 117 120* 98 108 111   BILIRUBIN mg/dL 0.4 0.6 0.6 0.4 0.5 0.2   ALT (SGPT) U/L 77* 94* 174* 153* 152* 139*    AST (SGOT) U/L 30 34* 74* 92* 126* 120*     Arterial Blood Gases:  Results from last 7 days   Lab Units 01/31/20  0957 01/31/20  0446 01/30/20  0338 01/29/20  0343 01/28/20  1858 01/28/20  1717   PH, ARTERIAL pH units 7.506* 7.465* 7.476* 7.436 7.371 7.34*   PCO2, ARTERIAL mm Hg 36.3 40.8 37.2 39.7 49.6*  --    PO2 ART mm Hg 63.1* 67.4* 88.8 83.7 96.6  --    FIO2 % 35 30 40 40 60  --        Results from last 7 days   Lab Units 01/29/20  0644   HEMOGLOBIN A1C % 5.90*       No results found for: LACTATE    Relevant imaging studies and labs from 02/04/20 were reviewed and interpreted by me    Medications (drips):    niCARdipine Last Rate: 10 mg/hr (02/04/20 0831)         bisacodyl 10 mg Rectal Once   carvedilol 25 mg Oral BID With Meals   ceFAZolin 1 g Intravenous Q8H   docusate sodium 100 mg Oral BID   ipratropium-albuterol 3 mL Nebulization 4x Daily - RT   lisinopril 20 mg Oral Daily   pantoprazole 40 mg Intravenous Q24H   [START ON 2/5/2020] potassium chloride 40 mEq Nasogastric Daily   sennosides 10 mL Oral BID   sodium chloride 10 mL Intravenous Q12H       Assessment/Plan   IMPRESSION / PLAN     Inpatient Problem List:  49 y.o.female:  Active Hospital Problems    Diagnosis   • **Acute L frontal ICH 1/28/20   • S/P L frontal craniotomy and evacuation of intraparenchymal hematoma 1/30/20   • Hypertensive emergency   • Elevated transaminase levels   • Dyslipidemia   • Acute hypoxemic/hypercarbic respiratory failure due to ICH. Required Ventilatory support   • JAYESH (obstructive sleep apnea)   • Tobacco abuse        Impression:  49 y.o.female with relevant PMH of HTN, HLD, JAYESH, Tobacco abuse admitted 1/28/2020 with left frontal ICH and hypertensive emergency.  NIH was 25 and volume of hemorrhage was 43 cc with surrounding edema and associated subarachnoid hemorrhage creating mass-effect on the left lateral ventricle with rightward shift of 7 mm.  CTA revealed atherosclerosis of bilateral carotid bifurcations with 80%  right and 75% left luminal narrowing as well as moderate to severe stenoses and irregularities throughout the MCA distribution bilaterally.  No vascular malformations / aneurysms however were noted.  Patient required intubation for airway protection.  Underwent left frontal craniotomy and evacuation of intraparenchymal hematoma on 1/30/20.  Had PEG placed 2/3/20.    Plan:  ICH - plan per Neurosurgery, control BP.  Sodium improving.      HTN - increase Coreg, wean off cardene as tolerated, continue ACE    Urinary Retention - likely neurogenic, re-anchor bailey    Constipation - stool softeners, adjust tube feeds    Fever - Surveillance cultures    Nutrition - Tube feeds    SQ Heparin for prophylaxis - cleared with NS    To tele when able to wean off cardene    Plan of care and goals reviewed with mulitdisciplinary team at daily rounds         Trino Mobley MD  Intensive Care Medicine  02/04/20 11:04 AM

## 2020-02-04 NOTE — NURSING NOTE
SO has been at the bedside during the day. He is somewhat confrontational at times. He wants to know exactly when the patient will be ready for rehab, how many days that will take and then when she will come home with him. I have tried to explain that we will have to see how she responds to rehab, that a definitive date and time of recovery cannot be given. He also is upset that as SO he is not the one contacted to make her decisions. He is aware that her sister is her next of kin and the one contacted for changes and decision making. It was explained that perhaps he should see a  regarding PO.

## 2020-02-04 NOTE — PROGRESS NOTES
Continued Stay Note  [facility]     Patient Name: Yee Goodwin  MRN: [id number]  Today's Date: 2/4/2020    Admit Date: 1/28/2020    Discharge Plan     Row Name 02/04/20 0912       Plan    Plan  Rehab    Plan Comments  Spoke c Ms. Goodwin and her S.O., Quentin Wong, at .  We discussed the need for rehab.  He would like her to transfer to [facility] when medically stable.  Call him with information on his cell ph # [phone].  Referral called to [facility] to evaluate for IP rehab.  He would like her to go to a SNF for rehab in [place], [place]. As 2nd choice.       Final Discharge Disposition Code  62 - inpatient rehab facility        Discharge Codes    No documentation.       Expected Discharge Date and Time     Expected Discharge Date Expected Discharge Time    Feb 5, 2020             Saurabh Frazier RN

## 2020-02-04 NOTE — PLAN OF CARE
Problem: Patient Care Overview  Goal: Plan of Care Review  Outcome: Ongoing (interventions implemented as appropriate)  Flowsheets (Taken 2/4/2020 7068)  Progress: improving  Plan of Care Reviewed With: patient  Patient Agreement with Plan of Care: agrees  Outcome Summary: Pt Max Ax2 for SPT from bed/chair. Pt conts to be limited d/t R sided weakness/neglect, educated pt/family on BUE HEP and compensatory stratgies to impove attention to R side. Recommend cont skilled IPOT OC. Recommend pt DC to IP rehab.

## 2020-02-04 NOTE — PLAN OF CARE
Problem: Patient Care Overview  Goal: Plan of Care Review  Outcome: Ongoing (interventions implemented as appropriate)  Flowsheets (Taken 2/4/2020 1151)  Progress: improving  Plan of Care Reviewed With: patient; spouse; other (see comments)  Patient Agreement with Plan of Care: agrees  Note:   SLP treatment completed. Will continue to address dysphagia and cog/comm. Please see note for further details and recommendations.

## 2020-02-04 NOTE — PROGRESS NOTES
"Adult Nutrition  Assessment/PES    Patient Name:  Yee Goodwin  YOB: 1970  MRN: [id number]  Admit Date:  1/28/2020    Assessment Date:  2/4/2020    Comments:  EN support adjusted to long-term fiber containing formula w/ 1 Pro-stat added. No free water w/ current Na+. RD will monitor tolerance and adequacy of delivery. Will review wts , pt has ~ 20 lb loss since adm - will attempt to verify these numbers if correct she will likely meet criteris for malnutrition.    Reason for Assessment     Row Name 02/04/20 1548          Reason for Assessment    Reason For Assessment  per organizational policy;TF/PN;follow-up protocol MDR, EN support  reassessment s/p PEG; 45 mins     Diagnosis  neurologic conditions pt adm w/ L frontal ICH s/p angiogram, s/p crani for evacuation of hematoma; pt extubated 1/30.      Identified At Risk by Screening Criteria  difficulty chewing/swallowing;tube feeding or parenteral nutrition         Nutrition/Diet History     Row Name 02/04/20 1548          Nutrition/Diet History    Typical Food/Fluid Intake  Pt doing well s/p PEG; a little more interactive. RN states she cannot find charting to indicate pt has had a BM since adm. MD ordered bowel regimen. okay for formulation change , fiber to asst w/ bowel mgt.     Factors Affecting Nutritional Intake  difficulty/impaired swallowing;inability to feed self;impaired cognitive status/motor control         Anthropometrics     Row Name 02/04/20 1551       Anthropometrics    Height Method  measured    Height  167.6 cm (66\")    Current Weight Method  measured    Weight  66.2 kg (145 lb 15.1 oz)       Admit Weight    Admit Weight Method  measured    Admit Weight  75.1 kg (165 lb 9.1 oz)       Ideal Body Weight (IBW)    Ideal Body Weight (IBW) (kg)  59.58    % Ideal Body Weight  111.12       Body Mass Index (BMI)    BMI (kg/m2)  23.61    BMI Assessment  BMI 17-18.4: protein-energy malnutrition grade I        Labs/Tests/Procedures/Meds     " "Row Name 02/04/20 1552          Labs/Procedures/Meds    Lab Results Reviewed  reviewed, pertinent     Lab Results Comments  low K+, replacement ordered; Na+- 131         Diagnostic Tests/Procedures    Diagnostic Test/Procedure Reviewed  reviewed, pertinent     Diagnostic Test/Procedures Comments  s/p trach and PEG (2/3)        Medications    Pertinent Medications Reviewed  reviewed, pertinent     Pertinent Medications Comments  NS@ 75 ml/hr, cardene weaning         Physical Findings     Row Name 02/04/20 1553          Physical Findings    Overall Physical Appearance  hemiplegia;on oxygen therapy     Tubes  PEG     Skin  surgical incision         Estimated/Assessed Needs     Row Name 02/04/20 1554          Weight Used For Calculations  66.2 kg (145 lb 15.1 oz)    Height  167.6 cm (66\")          Additional Documentation  KCAL/KG (Group);Calorie Requirements (Group);[place] (Group);Protein Requirements (Group);Fluid Requirements (Group)          Weight Used For Calorie Calculations  66.2 kg (145 lb 15.1 oz)    Estimated Calorie Requirement (kcal/day)  1900    Estimated Calorie Requirement Comment  3574-2116          Diagnoses Factors  1.4    Estimated Kcal ([place])   6949-6093          KCAL/KG  25 Kcal/Kg (kcal);30 Kcal/Kg (kcal)    25 Kcal/Kg (kcal)  1655    30 Kcal/Kg (kcal)  1986          Weight Used For Protein Calculations  66.2 kg (145 lb 15.1 oz)    Est Protein Requirement Amount (gms/kg)  1.5 gm protein    Estimated Protein Requirements (gms/day)  99.3          Estimated Fluid Requirements (mL/day)  1900    Estimated Fluid Requirement Method  RDA Method    RDA Method (mL)  1900    Andrea-Kelley Method (over 20 kg)  2824        Nutrition Prescription Ordered     Row Name 02/04/20 1557          Nutrition Prescription PO    Current PO Diet  NPO        Nutrition Prescription EN    Enteral Route  PEG     Product  Impact Peptide 1.5 (Pivot 1.5)     TF Delivery Method  Continuous     Continuous TF " Goal Rate (mL/hr)  60 mL/hr     Continuous TF Current Rate (mL/hr)  60 mL/hr     Continuous TF Goal Volume (mL)  1200 mL     Continuous TF Current Volume (mL)  1440 mL     Water flush (mL)   0 mL         Evaluation of Received Nutrient/Fluid Intake     Row Name 02/04/20 1559          Nutrient/Fluid Evaluation    Number of Days Evaluated  1 day     Additional Documentation  RDI (Group);Fiber (Group)        Calories Evaluation    Enteral Calories (kcal)  602     Total Calories (kcal)  602     % of Kcal Needs  32        Protein Evaluation    Enteral Protein (gm)  37     Total Protein (gm)  37     % of Protein Needs  32        Intake Assessment    Energy/Calorie Requirement Assessment  not meeting needs     Protein Requirement Assessment  not meeting needs     Fluid Requirement Assessment  other (see comments) per Na+  labs     Tolerance  tolerating        Fluid Intake Evaluation    Enteral (Free Water) Fluid (mL)  309     Free Water Flush Fluid (mL)  0     Total Free Water Intake (mL)  309 mL        Recommended Daily Intake Evaluation    RDI  Not Met        EN Evaluation    Number of Days EN Intake Evaluated  1 day     EN Average Volume Delivered (mL/day)  401 mL/day     % Goal Volume   33 %               Problem/Interventions:  Problem 1     Row Name 02/04/20 1601          Nutrition Diagnoses Problem 1    Problem 1  Inadequate Intake/Infusion     Inadequate Intake Type  Oral;Enteral     Macronutrient  Kcal;Fluid;Protein     Micronutrient  Vitamin;Mineral     Etiology (related to)  Medical Diagnosis     Neurological  ICH;Craniotomy     Signs/Symptoms (evidenced by)  NPO;EN Intake Delivery     Percent (%) of EN goal  33 %               Intervention Goal     Row Name 02/04/20 1602          Intervention Goal    General  Nutrition support treatment;Meet nutritional needs for age/condition     TF/PN  Adjust TF/PN;Tolerate TF at goal         Nutrition Intervention     Row Name 02/04/20 1602          Nutrition Intervention     RD/Tech Action  Follow Tx progress;Care plan reviewd;Recommend/ordered     Recommended/Ordered  EN         Nutrition Prescription     Row Name 02/04/20 1602          Nutrition Prescription EN    Enteral Prescription  Enteral begin/change;Enteral to supply     Enteral Route  PEG     Product  Isosource 1.5 richard     Modulars  Liquid Protein (15 gm/30 mL)     Liquid Protein (15 gm/30 mL)  30 mL/1 packet     Protein Liquid Frequency  Daily     TF Delivery Method  Continuous     Continuous TF Goal Rate (mL/hr)  60 mL/hr     Continuous TF Starting Rate (mL/hr)  30 mL/hr     Continuous TF Goal Volume (mL)  1200 mL     Continuous TF Starting Volume (mL)  720 mL     Water flush (mL)   0 mL     New EN Prescription Ordered?  Yes        EN to Supply    Kcal/Day  1900 Kcal/Day     Kcal/Kg  28 Kcal/Kg     Kcal/Kg Weight Method  Actual weight     Protein (gm/day)  96 gm/day     Meet Estimated Kcal Need (%)  102 %     Meet Estimated Protein Need (%)  96 %     TF Free H2O (mL)  934 mL     Total Free H2O (mL/day)  934 mL/day     Fiber Per Day (gm/day)  18 gm/day mixed source         Education/Evaluation     Row Name 02/04/20 1606          Monitor/Evaluation    Monitor  Per protocol;I&O;Pertinent labs;TF delivery/tolerance;Weight;Symptoms           Electronically signed by:  Ivania Enriquez MS,RD,LD  02/04/20 4:12 PM

## 2020-02-04 NOTE — THERAPY TREATMENT NOTE
Patient Name: Yee Goodwin  : 1970    MRN: [id number]                              Today's Date: 2020       Admit Date: 2020    Visit Dx:     ICD-10-CM ICD-9-CM   1. Acute intracerebral hemorrhage (CMS/HCC) I61.9 431   2. L frontal ICH  I62.9 432.9   3. Intracranial hemorrhage (CMS/HCC) I62.9 432.9   4. Dysphagia, unspecified type R13.10 787.20   5. Aphasia R47.01 784.3   6. Apraxia R48.2 784.69   7. Impaired mobility and ADLs Z74.09 799.89     Patient Active Problem List   Diagnosis   • History of right hip replacement   • PUD (peptic ulcer disease)   • S/P madan   • Essential hypertension   • Gastritis   • JAYESH (obstructive sleep apnea)   • Epigastric pain   • Diarrhea   • Diverticulosis   • Hiatal hernia   • Tobacco abuse   • Acute L frontal ICH 20   • Hypertensive emergency   • Elevated transaminase levels   • Dyslipidemia   • Acute hypoxemic/hypercarbic respiratory failure due to ICH. Required Ventilatory support   • Acute intracerebral hemorrhage (CMS/HCC)   • S/P L frontal craniotomy and evacuation of intraparenchymal hematoma 20     Past Medical History:   Diagnosis Date   • Arthritis    • Diverticulitis    • GERD (gastroesophageal reflux disease)    • Hypertension      Past Surgical History:   Procedure Laterality Date   • CEREBRAL ANGIOGRAM N/A 2020    Procedure: Cerebral angiogram;  Surgeon: Merlin Black MD;  Location: [facility] CATH INVASIVE LOCATION;  Service: Interventional Radiology   • CHOLECYSTECTOMY     • COLONOSCOPY     • CRANIOTOMY FOR SUBDURAL HEMATOMA Left 2020    Procedure: FRONTAL CRANIOTOMY FOR EVACUATION OF INTRAPARENCHYMAL HEMATOMA  LEFT;  Surgeon: Jose Cole MD;  Location: [facility] OR;  Service: Neurosurgery   • ENDOSCOPY W/ PEG TUBE PLACEMENT N/A 2/3/2020    Procedure: ESOPHAGOGASTRODUODENOSCOPY WITH PERCUTANEOUS ENDOSCOPIC GASTROSTOMY TUBE INSERTION AT BEDSIDE;  Surgeon: Andrew Young MD;  Location: [facility] ENDOSCOPY;  Service:  General;  Laterality: N/A;   • HAND TENDON SURGERY Right     patient hand right thumb tendon surgery   • HIP SURGERY     • JOINT REPLACEMENT     • TUBAL ABDOMINAL LIGATION       General Information     Row Name 02/04/20 1412          PT Evaluation Time/Intention    Document Type  therapy note (daily note)  -NS     Mode of Treatment  individual therapy;physical therapy  -NS     Row Name 02/04/20 1412          General Information    Patient Profile Reviewed?  yes  -NS     Existing Precautions/Restrictions  fall;oxygen therapy device and L/min;other (see comments) R sided weakness/neglect, PEG, aphasia  -NS     Row Name 02/04/20 1412          Cognitive Assessment/Intervention- PT/OT    Orientation Status (Cognition)  oriented to;person  -NS     Cognitive Assessment/Intervention Comment  Communicates with thumbs up/down.  -NS     Row Name 02/04/20 1412          Safety Issues, Functional Mobility    Safety Issues Affecting Function (Mobility)  safety precaution awareness;safety precautions follow-through/compliance;sequencing abilities  -NS     Impairments Affecting Function (Mobility)  balance;cognition;coordination;endurance/activity tolerance;strength;visual/perceptual;postural/trunk control;range of motion (ROM);motor control;grasp  -NS       User Key  (r) = Recorded By, (t) = Taken By, (c) = Cosigned By    Initials Name Provider Type    Jeanette Gardner PT Physical Therapist        Mobility     Row Name 02/04/20 1412          Bed Mobility Assessment/Treatment    Bed Mobility Assessment/Treatment  supine-sit;sit-supine  -NS     Scooting/Bridging [place] (Bed Mobility)  maximum assist (25% patient effort);2 person assist;verbal cues  -NS     Supine-Sit [place] (Bed Mobility)  maximum assist (25% patient effort);2 person assist;verbal cues  -NS     Sit-Supine [place] (Bed Mobility)  maximum assist (25% patient effort);2 person assist;verbal cues  -NS     Assistive Device (Bed Mobility)  bed rails;draw  sheet;head of bed elevated  -NS     Comment (Bed Mobility)  VCing for sequencing. Patient with improved ability to use L UE to assist with trunk to come to sitting.  Once sitting, pt required Max A with moments of CGA. Pt leans posteriorly and to R. Patient practiced ant/post and lateral weight shifting onto L outstretched arm  -NS     Row Name 02/04/20 1412          Transfer Assessment/Treatment    Comment (Transfers)  Patient completed 2 reps STS transfer from EOB with R knee blocked due to buckling. Patient was able to maintain standing for approx. 20 secs each rep before needing to sit. Pt practiced weight shifting in standing.  -NS     Row Name 02/04/20 1412          Sit-Stand Transfer    Sit-Stand [place] (Transfers)  maximum assist (25% patient effort);2 person assist;verbal cues  -NS     Assistive Device (Sit-Stand Transfers)  other (see comments) BUE support  -NS     Row Name 02/04/20 1412          Gait/Stairs Assessment/Training    [place] Level (Gait)  unable to assess  -NS       User Key  (r) = Recorded By, (t) = Taken By, (c) = Cosigned By    Initials Name Provider Type    Jeanette Gardner PT Physical Therapist        Obj/Interventions     Row Name 02/04/20 1412          Therapeutic Exercise    Lower Extremity Range of Motion (Therapeutic Exercise)  ankle dorsiflexion/plantar flexion, bilateral  -NS     Exercise Type (Therapeutic Exercise)  AAROM (active assistive range of motion);AROM (active range of motion)  -NS     Position (Therapeutic Exercise)  supine  -NS     Sets/Reps (Therapeutic Exercise)  1x10  -NS     Expected Outcome (Therapeutic Exercise)  facilitate normal movement patterns;improve functional stability  -NS     Row Name 02/04/20 1412          Static Sitting Balance    Level of [place] (Unsupported Sitting, Static Balance)  maximal assist, 25 to 49% patient effort  -NS     Sitting Position (Unsupported Sitting, Static Balance)  sitting on edge of bed  -NS     Time Able to  Maintain Position (Unsupported Sitting, Static Balance)  30 to 45 seconds  -NS     Comment (Unsupported Sitting, Static Balance)  Moments of CGA. Pt leans R and posteriorly.  -NS     [facility] Name 02/04/20 1412          Static Standing Balance    Level of [place] (Supported Standing, Static Balance)  maximal assist, 25 to 49% patient effort;2 person assist  -NS     Time Able to Maintain Position (Supported Standing, Static Balance)  15 to 30 seconds  -NS     Assistive Device Utilized (Supported Standing, Static Balance)  other (see comments) BUE support  -NS       User Key  (r) = Recorded By, (t) = Taken By, (c) = Cosigned By    Initials Name Provider Type    Jeanette Gardner PT Physical Therapist        Goals/Plan    No documentation.       Clinical Impression     Row Name 02/04/20 1412          Pain Assessment    Additional Documentation  Pain Scale: FACES Pre/Post-Treatment (Group)  -NS     Row Name 02/04/20 1412          Pain Scale: FACES Pre/Post-Treatment    Pain: FACES Scale, Pretreatment  0-->no hurt  -NS     Pain: FACES Scale, Post-Treatment  0-->no hurt  -NS     Row Name 02/04/20 1412          Plan of Care Review    Plan of Care Reviewed With  patient  -NS     Progress  improving  -NS     Row Name 02/04/20 1412          Vital Signs    Pre Systolic BP Rehab  115  -NS     Pre Treatment Diastolic BP  63  -NS     Post Systolic BP Rehab  118  -NS     Post Treatment Diastolic BP  69  -NS     Pretreatment Heart Rate (beats/min)  96  -NS     Posttreatment Heart Rate (beats/min)  99  -NS     Post SpO2 (%)  94  -NS     O2 Delivery Post Treatment  nasal cannula  -NS     Pre Patient Position  Supine  -NS     Intra Patient Position  Standing  -NS     Post Patient Position  Supine  -NS     Row Name 02/04/20 1412          Positioning and Restraints    Pre-Treatment Position  in bed  -NS     Post Treatment Position  bed  -NS     In Bed  fowlers;call light within reach;encouraged to call for assist;with  family/caregiver;side rails up x3;RUE elevated;LUE elevated;SCD pump applied;heels elevated  -NS       User Key  (r) = Recorded By, (t) = Taken By, (c) = Cosigned By    Initials Name Provider Type    Jeanette Gardner, TOM Physical Therapist        Outcome Measures     Row Name 02/04/20 1412          How much help from another person do you currently need...    Turning from your back to your side while in flat bed without using bedrails?  2  -NS     Moving from lying on back to sitting on the side of a flat bed without bedrails?  2  -NS     Moving to and from a bed to a chair (including a wheelchair)?  2  -NS     Standing up from a chair using your arms (e.g., wheelchair, bedside chair)?  2  -NS     Climbing 3-5 steps with a railing?  1  -NS     To walk in hospital room?  1  -NS     AM-PAC 6 Clicks Score (PT)  10  -NS     Row Name 02/04/20 1412          Modified [place] Scale    Pre-Stroke Modified [place] Scale  0 - No Symptoms at all.  -NS     Modified [place] Scale  4 - Moderately severe disability.  Unable to walk without assistance, and unable to attend to own bodily needs without assistance.  -NS     Row Name 02/04/20 1412          Functional Assessment    Outcome Measure Options  AM-PAC 6 Clicks Basic Mobility (PT);Modified [place]  -NS       User Key  (r) = Recorded By, (t) = Taken By, (c) = Cosigned By    Initials Name Provider Type    Jeanette Gardner PT Physical Therapist          PT Recommendation and Plan     Plan of Care Reviewed With: patient  Progress: improving  Outcome Summary: Patient was able to improve activity tolerance this session. She  completed supine<>sit with Max A x2 and practiced weight shifting to/from midline for sitting balance. Max A x2 and BUE support for STS and practiced weight shifting in standing with R knee blocked. Patient able to maintain standing for 20 secs ea for 2 reps of standing. Continue to progress as appropriate.     Time Calculation:   PT Charges     Row Name 02/04/20  1412             Time Calculation    Start Time  1412  -NS      PT Received On  02/04/20  -NS      PT Goal Re-Cert Due Date  02/11/20  -NS         Timed Charges    37971 - PT Therapeutic Activity Minutes  26  -NS        User Key  (r) = Recorded By, (t) = Taken By, (c) = Cosigned By    Initials Name Provider Type    Jeanette Gardner PT Physical Therapist        Therapy Charges for Today     Code Description Service Date Service Provider Modifiers Qty    49865121415 HC PT THER PROC EA 15 MIN 2/3/2020 Jeanette Busby, PT GP 1    38263727851 HC PT THERAPEUTIC ACT EA 15 MIN 2/3/2020 Jeanette Busby, PT GP 1    65773510126 HC PT THERAPEUTIC ACT EA 15 MIN 2/4/2020 Jeanette Busby, PT GP 2    81748748630 HC PT THER SUPP EA 15 MIN 2/4/2020 Jeanette Busby, PT GP 2          PT G-Codes  Outcome Measure Options: AM-PAC 6 Clicks Basic Mobility (PT), Modified [place]  AM-PAC 6 Clicks Score (PT): 10  AM-PAC 6 Clicks Score (OT): 8  Modified [place] Scale: 4 - Moderately severe disability.  Unable to walk without assistance, and unable to attend to own bodily needs without assistance.    Jeanette Busby PT  2/4/2020

## 2020-02-05 LAB
ALBUMIN SERPL-MCNC: 3.7 G/DL (ref 3.5–5.2)
ALBUMIN/GLOB SERPL: 1 G/DL
ALP SERPL-CCNC: 133 U/L (ref 39–117)
ALT SERPL W P-5'-P-CCNC: 64 U/L (ref 1–33)
ANION GAP SERPL CALCULATED.3IONS-SCNC: 13 MMOL/L (ref 5–15)
AST SERPL-CCNC: 26 U/L (ref 1–32)
BASOPHILS # BLD AUTO: 0.06 10*3/MM3 (ref 0–0.2)
BASOPHILS NFR BLD AUTO: 0.4 % (ref 0–1.5)
BILIRUB SERPL-MCNC: 0.3 MG/DL (ref 0.2–1.2)
BUN BLD-MCNC: 14 MG/DL (ref 6–20)
BUN/CREAT SERPL: 40 (ref 7–25)
CALCIUM SPEC-SCNC: 9.6 MG/DL (ref 8.6–10.5)
CHLORIDE SERPL-SCNC: 92 MMOL/L (ref 98–107)
CO2 SERPL-SCNC: 27 MMOL/L (ref 22–29)
CREAT BLD-MCNC: 0.35 MG/DL (ref 0.57–1)
DEPRECATED RDW RBC AUTO: 40.4 FL (ref 37–54)
EOSINOPHIL # BLD AUTO: 0.24 10*3/MM3 (ref 0–0.4)
EOSINOPHIL NFR BLD AUTO: 1.7 % (ref 0.3–6.2)
ERYTHROCYTE [DISTWIDTH] IN BLOOD BY AUTOMATED COUNT: 12 % (ref 12.3–15.4)
GFR SERPL CREATININE-BSD FRML MDRD: >150 ML/MIN/1.73
GLOBULIN UR ELPH-MCNC: 3.7 GM/DL
GLUCOSE BLD-MCNC: 157 MG/DL (ref 65–99)
GLUCOSE BLDC GLUCOMTR-MCNC: 147 MG/DL (ref 70–130)
HCT VFR BLD AUTO: 35.4 % (ref 34–46.6)
HGB BLD-MCNC: 11.7 G/DL (ref 12–15.9)
IMM GRANULOCYTES # BLD AUTO: 0.26 10*3/MM3 (ref 0–0.05)
IMM GRANULOCYTES NFR BLD AUTO: 1.8 % (ref 0–0.5)
LYMPHOCYTES # BLD AUTO: 1.96 10*3/MM3 (ref 0.7–3.1)
LYMPHOCYTES NFR BLD AUTO: 13.7 % (ref 19.6–45.3)
MAGNESIUM SERPL-MCNC: 2.1 MG/DL (ref 1.6–2.6)
MCH RBC QN AUTO: 30.4 PG (ref 26.6–33)
MCHC RBC AUTO-ENTMCNC: 33.1 G/DL (ref 31.5–35.7)
MCV RBC AUTO: 91.9 FL (ref 79–97)
MONOCYTES # BLD AUTO: 1.67 10*3/MM3 (ref 0.1–0.9)
MONOCYTES NFR BLD AUTO: 11.7 % (ref 5–12)
NEUTROPHILS # BLD AUTO: 10.11 10*3/MM3 (ref 1.7–7)
NEUTROPHILS NFR BLD AUTO: 70.7 % (ref 42.7–76)
NRBC BLD AUTO-RTO: 0 /100 WBC (ref 0–0.2)
PHOSPHATE SERPL-MCNC: 3 MG/DL (ref 2.5–4.5)
PLATELET # BLD AUTO: 412 10*3/MM3 (ref 140–450)
PMV BLD AUTO: 9.5 FL (ref 6–12)
POTASSIUM BLD-SCNC: 4.1 MMOL/L (ref 3.5–5.2)
PROT SERPL-MCNC: 7.4 G/DL (ref 6–8.5)
RBC # BLD AUTO: 3.85 10*6/MM3 (ref 3.77–5.28)
SODIUM BLD-SCNC: 132 MMOL/L (ref 136–145)
WBC NRBC COR # BLD: 14.3 10*3/MM3 (ref 3.4–10.8)

## 2020-02-05 PROCEDURE — 25010000003 CEFAZOLIN 1-4 GM/50ML-% SOLUTION: Performed by: SURGERY

## 2020-02-05 PROCEDURE — 25010000002 HEPARIN (PORCINE) PER 1000 UNITS: Performed by: INTERNAL MEDICINE

## 2020-02-05 PROCEDURE — 82962 GLUCOSE BLOOD TEST: CPT

## 2020-02-05 PROCEDURE — 99024 POSTOP FOLLOW-UP VISIT: CPT | Performed by: PHYSICIAN ASSISTANT

## 2020-02-05 PROCEDURE — 94799 UNLISTED PULMONARY SVC/PX: CPT

## 2020-02-05 PROCEDURE — 84100 ASSAY OF PHOSPHORUS: CPT | Performed by: INTERNAL MEDICINE

## 2020-02-05 PROCEDURE — 97530 THERAPEUTIC ACTIVITIES: CPT

## 2020-02-05 PROCEDURE — 80053 COMPREHEN METABOLIC PANEL: CPT | Performed by: INTERNAL MEDICINE

## 2020-02-05 PROCEDURE — 92526 ORAL FUNCTION THERAPY: CPT

## 2020-02-05 PROCEDURE — 99233 SBSQ HOSP IP/OBS HIGH 50: CPT | Performed by: INTERNAL MEDICINE

## 2020-02-05 PROCEDURE — 85025 COMPLETE CBC W/AUTO DIFF WBC: CPT | Performed by: INTERNAL MEDICINE

## 2020-02-05 PROCEDURE — 92507 TX SP LANG VOICE COMM INDIV: CPT

## 2020-02-05 PROCEDURE — 83735 ASSAY OF MAGNESIUM: CPT | Performed by: INTERNAL MEDICINE

## 2020-02-05 RX ORDER — LISINOPRIL 40 MG/1
40 TABLET ORAL DAILY
Status: DISCONTINUED | OUTPATIENT
Start: 2020-02-06 | End: 2020-02-07 | Stop reason: HOSPADM

## 2020-02-05 RX ORDER — AMINO ACIDS/PROTEIN HYDROLYS 15G-100/30
30 LIQUID (ML) ORAL DAILY
Status: DISCONTINUED | OUTPATIENT
Start: 2020-02-05 | End: 2020-02-07 | Stop reason: HOSPADM

## 2020-02-05 RX ORDER — LISINOPRIL 20 MG/1
20 TABLET ORAL ONCE
Status: COMPLETED | OUTPATIENT
Start: 2020-02-05 | End: 2020-02-05

## 2020-02-05 RX ORDER — IPRATROPIUM BROMIDE AND ALBUTEROL SULFATE 2.5; .5 MG/3ML; MG/3ML
3 SOLUTION RESPIRATORY (INHALATION) EVERY 6 HOURS PRN
Status: DISCONTINUED | OUTPATIENT
Start: 2020-02-05 | End: 2020-02-07 | Stop reason: HOSPADM

## 2020-02-05 RX ADMIN — HEPARIN SODIUM 5000 UNITS: 5000 INJECTION INTRAVENOUS; SUBCUTANEOUS at 15:36

## 2020-02-05 RX ADMIN — DOCUSATE SODIUM 100 MG: 50 LIQUID ORAL at 21:02

## 2020-02-05 RX ADMIN — POTASSIUM CHLORIDE 40 MEQ: 1.5 POWDER, FOR SOLUTION ORAL at 09:25

## 2020-02-05 RX ADMIN — HEPARIN SODIUM 5000 UNITS: 5000 INJECTION INTRAVENOUS; SUBCUTANEOUS at 06:07

## 2020-02-05 RX ADMIN — DOCUSATE SODIUM 100 MG: 50 LIQUID ORAL at 09:25

## 2020-02-05 RX ADMIN — CARVEDILOL 25 MG: 12.5 TABLET, FILM COATED ORAL at 09:25

## 2020-02-05 RX ADMIN — PANTOPRAZOLE SODIUM 40 MG: 40 INJECTION, POWDER, FOR SOLUTION INTRAVENOUS at 09:26

## 2020-02-05 RX ADMIN — SODIUM CHLORIDE, PRESERVATIVE FREE 10 ML: 5 INJECTION INTRAVENOUS at 09:26

## 2020-02-05 RX ADMIN — Medication 30 ML: at 18:18

## 2020-02-05 RX ADMIN — LISINOPRIL 20 MG: 20 TABLET ORAL at 11:40

## 2020-02-05 RX ADMIN — SENNOSIDES 10 ML: 8.8 LIQUID ORAL at 09:26

## 2020-02-05 RX ADMIN — CEFAZOLIN SODIUM 1 G: 1 INJECTION, SOLUTION INTRAVENOUS at 21:02

## 2020-02-05 RX ADMIN — CEFAZOLIN SODIUM 1 G: 1 INJECTION, SOLUTION INTRAVENOUS at 06:17

## 2020-02-05 RX ADMIN — LISINOPRIL 20 MG: 20 TABLET ORAL at 09:25

## 2020-02-05 RX ADMIN — CEFAZOLIN SODIUM 1 G: 1 INJECTION, SOLUTION INTRAVENOUS at 15:36

## 2020-02-05 RX ADMIN — SODIUM CHLORIDE, PRESERVATIVE FREE 10 ML: 5 INJECTION INTRAVENOUS at 23:01

## 2020-02-05 RX ADMIN — HEPARIN SODIUM 5000 UNITS: 5000 INJECTION INTRAVENOUS; SUBCUTANEOUS at 21:02

## 2020-02-05 RX ADMIN — CARVEDILOL 25 MG: 12.5 TABLET, FILM COATED ORAL at 18:18

## 2020-02-05 RX ADMIN — IPRATROPIUM BROMIDE AND ALBUTEROL SULFATE 3 ML: 2.5; .5 SOLUTION RESPIRATORY (INHALATION) at 07:11

## 2020-02-05 NOTE — PLAN OF CARE
Problem: Patient Care Overview  Goal: Plan of Care Review  Outcome: Ongoing (interventions implemented as appropriate)  Flowsheets (Taken 2/5/2020 1137)  Outcome Summary: Patient was able to improve her sitting balance this session. She transferred supine>sit with Max A x2 and sat EOB with Max A x1 with moments of SBA where pt was able to hold herself safely upright with L UE support. Max A x2 for STS and stand pivot transfer to chair with R knee blocked. Continue to progress as appropriate.

## 2020-02-05 NOTE — PLAN OF CARE
Problem: Skin Injury Risk (Adult)  Intervention: Prevent/Manage Excess Moisture  Flowsheets (Taken 2/5/2020 1706)  Perineal Care: absorbent pad changed; perineum cleansed  Skin Protection: incontinence pads utilized

## 2020-02-05 NOTE — PLAN OF CARE
Problem: Stroke (Hemorrhagic) (Adult)  Goal: Signs and Symptoms of Listed Potential Problems Will be Absent, Minimized or Managed (Stroke)  Outcome: Outcome(s) achieved

## 2020-02-05 NOTE — PROGRESS NOTES
"NEUROSURGERY PROGRESS NOTE    Interval History:   Hospital day 8 postoperative day 6 status post left frontal craniotomy for evacuation of intraparenchymal hematoma.  No events overnight.  Neurologic exam remains unchanged.  Cardene discontinued yesterday.    Vital Signs  Blood pressure 149/83, pulse 104, temperature 99.4 °F (37.4 °C), temperature source Axillary, resp. rate 18, height 167.6 cm (66\"), weight 66.2 kg (145 lb 15.1 oz), SpO2 97 %.    Physical Exam:  Patient is awake.  She will follow simple commands and is able to answer yes with a thumbs up  Right-sided visual neglect  Moves left upper and lower extremity spontaneously and to command  Her incision remains clean, dry and intact without erythema, fluctuance or drainage     Results Review:    I/O last 3 completed shifts:  In: 2842.6 [I.V.:981.6; Other:20; NG/GT:1641; IV Piggyback:200]  Out: 3395 [Urine:3395]  I/O this shift:  In: 121 [NG/GT:71; IV Piggyback:50]  Out: 95 [Urine:95]      Assessment/Plan:   Status post left frontal craniotomy for intraparenchymal hematoma  Sodium stable. Goal normonatremia   From a neurosurgical standpoint, the patient may be discharged to rehab when medically ready    Kathy Del Real PA-C  02/05/20  9:48 AM    "

## 2020-02-05 NOTE — PLAN OF CARE
Problem: Patient Care Overview  Goal: Plan of Care Review  Outcome: Ongoing (interventions implemented as appropriate)  Flowsheets  Taken 2/5/2020 0637 by Roslyn Diamond, RN  Progress: no change  Outcome Summary: Pt rested well overnight, small bowel movement at the beginning of the shift. UOP adequate. Tube feeding tolerated. Max temp 99.9. Pt fidgeting and repeatedly pulling off O2 and crani site dressing. VSS.  Taken 2/4/2020 2000 by Mckenzie Portillo, Nursing Student  Plan of Care Reviewed With: patient

## 2020-02-05 NOTE — THERAPY TREATMENT NOTE
Patient Name: Yee Goodwin  : 1970    MRN: [id number]                              Today's Date: 2020       Admit Date: 2020    Visit Dx:     ICD-10-CM ICD-9-CM   1. Acute intracerebral hemorrhage (CMS/HCC) I61.9 431   2. L frontal ICH  I62.9 432.9   3. Intracranial hemorrhage (CMS/HCC) I62.9 432.9   4. Dysphagia, unspecified type R13.10 787.20   5. Aphasia R47.01 784.3   6. Apraxia R48.2 784.69   7. Impaired mobility and ADLs Z74.09 799.89     Patient Active Problem List   Diagnosis   • History of right hip replacement   • PUD (peptic ulcer disease)   • S/P madan   • Essential hypertension   • Gastritis   • JAYESH (obstructive sleep apnea)   • Epigastric pain   • Diarrhea   • Diverticulosis   • Hiatal hernia   • Tobacco abuse   • Acute L frontal ICH 20   • Hypertensive emergency   • Elevated transaminase levels   • Dyslipidemia   • Acute hypoxemic/hypercarbic respiratory failure due to ICH. Required Ventilatory support   • Acute intracerebral hemorrhage (CMS/HCC)   • S/P L frontal craniotomy and evacuation of intraparenchymal hematoma 20     Past Medical History:   Diagnosis Date   • Arthritis    • Diverticulitis    • GERD (gastroesophageal reflux disease)    • Hypertension      Past Surgical History:   Procedure Laterality Date   • CEREBRAL ANGIOGRAM N/A 2020    Procedure: Cerebral angiogram;  Surgeon: Merlin Black MD;  Location: [facility] CATH INVASIVE LOCATION;  Service: Interventional Radiology   • CHOLECYSTECTOMY     • COLONOSCOPY     • CRANIOTOMY FOR SUBDURAL HEMATOMA Left 2020    Procedure: FRONTAL CRANIOTOMY FOR EVACUATION OF INTRAPARENCHYMAL HEMATOMA  LEFT;  Surgeon: Jose Cole MD;  Location: [facility] OR;  Service: Neurosurgery   • ENDOSCOPY W/ PEG TUBE PLACEMENT N/A 2/3/2020    Procedure: ESOPHAGOGASTRODUODENOSCOPY WITH PERCUTANEOUS ENDOSCOPIC GASTROSTOMY TUBE INSERTION AT BEDSIDE;  Surgeon: Andrew Young MD;  Location: [facility] ENDOSCOPY;  Service:  General;  Laterality: N/A;   • HAND TENDON SURGERY Right     patient hand right thumb tendon surgery   • HIP SURGERY     • JOINT REPLACEMENT     • TUBAL ABDOMINAL LIGATION       General Information     Row Name 02/05/20 1137          PT Evaluation Time/Intention    Document Type  therapy note (daily note)  -NS     Mode of Treatment  individual therapy;physical therapy  -NS     Row Name 02/05/20 1137          General Information    Patient Profile Reviewed?  yes  -NS     Existing Precautions/Restrictions  fall;oxygen therapy device and L/min;other (see comments) R sided weakness/neglect, PEG, aphasia  -NS     Row Name 02/05/20 1137          Cognitive Assessment/Intervention- PT/OT    Orientation Status (Cognition)  oriented to;person  -NS     Cognitive Assessment/Intervention Comment  Communicates with thumbs up/down  -NS     Row Name 02/05/20 1137          Safety Issues, Functional Mobility    Safety Issues Affecting Function (Mobility)  safety precaution awareness;safety precautions follow-through/compliance;sequencing abilities;problem solving  -NS     Impairments Affecting Function (Mobility)  balance;cognition;coordination;endurance/activity tolerance;strength;visual/perceptual;postural/trunk control;range of motion (ROM);motor control;grasp  -NS       User Key  (r) = Recorded By, (t) = Taken By, (c) = Cosigned By    Initials Name Provider Type    NS Jeanette Busby PT Physical Therapist        Mobility     Row Name 02/05/20 1137          Bed Mobility Assessment/Treatment    Bed Mobility Assessment/Treatment  supine-sit  -NS     Supine-Sit [place] (Bed Mobility)  maximum assist (25% patient effort);2 person assist;verbal cues  -NS     Assistive Device (Bed Mobility)  bed rails;draw sheet;head of bed elevated  -NS     Comment (Bed Mobility)  VCing for sequencing. Once sitting EOB, pt required Max A for maintaining sitting balance with moments of SBA. Pt practiced weight shifting onto outstretched L UE as well  as looking towards R.  -NS     Row Name 02/05/20 1137          Transfer Assessment/Treatment    Comment (Transfers)  Patient completed STS and bed>chair transfer with Max A x2 with R knee blocked due to weakness. Patient continues to demonstrated decreased weight shifting ability.  -NS     Row Name 02/05/20 1137          Bed-Chair Transfer    Bed-Chair [place] (Transfers)  maximum assist (25% patient effort);2 person assist;verbal cues  -NS     Assistive Device (Bed-Chair Transfers)  other (see comments) BUE support  -NS     Row Name 02/05/20 1137          Sit-Stand Transfer    Sit-Stand [place] (Transfers)  maximum assist (25% patient effort);2 person assist;verbal cues  -NS     Assistive Device (Sit-Stand Transfers)  other (see comments) BUE support  -NS     [facility] Name 02/05/20 1137          Gait/Stairs Assessment/Training    [place] Level (Gait)  unable to assess  -NS       User Key  (r) = Recorded By, (t) = Taken By, (c) = Cosigned By    Initials Name Provider Type    Jeanette Gardner PT Physical Therapist        Obj/Interventions     [facility] Name 02/05/20 1137          Therapeutic Exercise    Lower Extremity (Therapeutic Exercise)  SLR (straight leg raise), bilateral;heel slides, bilateral  -NS     Lower Extremity Range of Motion (Therapeutic Exercise)  ankle dorsiflexion/plantar flexion, bilateral  -NS     Exercise Type (Therapeutic Exercise)  AROM (active range of motion);PROM (passive range of motion)  -NS     Position (Therapeutic Exercise)  supine  -NS     Sets/Reps (Therapeutic Exercise)  1x10  -NS     Expected Outcome (Therapeutic Exercise)  facilitate normal movement patterns;improve functional stability  -NS     Comment (Therapeutic Exercise)  AROM L LE, PROM R LE  -NS     Row Name 02/05/20 1137          Static Sitting Balance    Level of [place] (Unsupported Sitting, Static Balance)  maximal assist, 25 to 49% patient effort  -NS     Sitting Position (Unsupported Sitting, Static Balance)   sitting on edge of bed  -NS     Time Able to Maintain Position (Unsupported Sitting, Static Balance)  1 to 2 minutes  -NS     Comment (Unsupported Sitting, Static Balance)  Progressed to moments of SBA. Pt leans R and posteriorly.   -NS     Row Name 02/05/20 1137          Static Standing Balance    Level of [place] (Supported Standing, Static Balance)  maximal assist, 25 to 49% patient effort;2 person assist  -NS     Time Able to Maintain Position (Supported Standing, Static Balance)  less than 15 seconds  -NS     Assistive Device Utilized (Supported Standing, Static Balance)  other (see comments) BUE support  -NS     [facility] Name 02/05/20 1137          Dynamic Standing Balance    Level of [place], Reaches Outside Midline (Standing, Dynamic Balance)  maximal assist, 25 to 49% patient effort;2 person assist  -NS     Assistive Device Utilized (Supported Standing, Dynamic Balance)  other (see comments) BUE support  -NS       User Key  (r) = Recorded By, (t) = Taken By, (c) = Cosigned By    Initials Name Provider Type    Jeanette Gardner PT Physical Therapist        Goals/Plan    No documentation.       Clinical Impression     [facility] Name 02/05/20 1137          Pain Assessment    Additional Documentation  Pain Scale: FACES Pre/Post-Treatment (Group)  -NS     Row Name 02/05/20 1137          Pain Scale: FACES Pre/Post-Treatment    Pain: FACES Scale, Pretreatment  0-->no hurt  -NS     Pain: FACES Scale, Post-Treatment  0-->no hurt  -NS     Row Name 02/05/20 1137          Plan of Care Review    Plan of Care Reviewed With  patient  -NS     Progress  improving  -NS     Row Name 02/05/20 1137          Vital Signs    Post Systolic BP Rehab  121  -NS     Post Treatment Diastolic BP  66  -NS     Pretreatment Heart Rate (beats/min)  75  -NS     Posttreatment Heart Rate (beats/min)  76  -NS     Pre SpO2 (%)  95  -NS     O2 Delivery Pre Treatment  nasal cannula  -NS     Post SpO2 (%)  94  -NS     O2 Delivery Post Treatment   nasal cannula  -NS     Pre Patient Position  Supine  -NS     Intra Patient Position  Standing  -NS     Post Patient Position  Sitting  -NS     Row Name 02/05/20 1137          Positioning and Restraints    Pre-Treatment Position  in bed  -NS     Post Treatment Position  chair  -NS     In Chair  reclined;call light within reach;encouraged to call for assist;exit alarm on;with nsg;waffle cushion;RUE elevated;on mechanical lift sling;legs elevated;heels elevated  -NS       User Key  (r) = Recorded By, (t) = Taken By, (c) = Cosigned By    Initials Name Provider Type    Jeanette Gardner, TOM Physical Therapist        Outcome Measures     Row Name 02/05/20 1137          How much help from another person do you currently need...    Turning from your back to your side while in flat bed without using bedrails?  2  -NS     Moving from lying on back to sitting on the side of a flat bed without bedrails?  2  -NS     Moving to and from a bed to a chair (including a wheelchair)?  2  -NS     Standing up from a chair using your arms (e.g., wheelchair, bedside chair)?  2  -NS     Climbing 3-5 steps with a railing?  1  -NS     To walk in hospital room?  1  -NS     AM-PAC 6 Clicks Score (PT)  10  -NS     Row Name 02/05/20 1137          Modified [place] Scale    Pre-Stroke Modified [place] Scale  0 - No Symptoms at all.  -NS     Modified [place] Scale  4 - Moderately severe disability.  Unable to walk without assistance, and unable to attend to own bodily needs without assistance.  -NS     Row Name 02/05/20 1137          Functional Assessment    Outcome Measure Options  Modified [place];AM-PAC 6 Clicks Basic Mobility (PT)  -NS       User Key  (r) = Recorded By, (t) = Taken By, (c) = Cosigned By    Initials Name Provider Type    Jeanette Gardner, PT Physical Therapist          PT Recommendation and Plan     Plan of Care Reviewed With: patient  Progress: improving  Outcome Summary: Patient was able to improve her sitting balance this session.  She transferred supine>sit with Max A x2 and sat EOB with Max A x1 with moments of SBA where pt was able to hold herself safely upright with L UE support. Max A x2 for STS and stand pivot transfer to chair with R knee blocked. Continue to progress as appropriate.     Time Calculation:   PT Charges     Row Name 02/05/20 1137             Time Calculation    Start Time  1137  -NS      PT Received On  02/05/20  -NS      PT Goal Re-Cert Due Date  02/11/20  -NS         Timed Charges    13771 - PT Therapeutic Activity Minutes  23  -NS        User Key  (r) = Recorded By, (t) = Taken By, (c) = Cosigned By    Initials Name Provider Type    NS Jeanette Busby, PT Physical Therapist        Therapy Charges for Today     Code Description Service Date Service Provider Modifiers Qty    86510995384  PT THERAPEUTIC ACT EA 15 MIN 2/4/2020 Jeanette Busby, PT GP 2    18740737277 HC PT THER SUPP EA 15 MIN 2/4/2020 Jeanette Busby, PT GP 2    06788026505 HC PT THERAPEUTIC ACT EA 15 MIN 2/5/2020 Jeanette Busby, PT GP 2    49007070172 HC PT THER SUPP EA 15 MIN 2/5/2020 Jeanette Busby, PT GP 2          PT G-Codes  Outcome Measure Options: Modified [place], AM-PAC 6 Clicks Basic Mobility (PT)  AM-PAC 6 Clicks Score (PT): 10  AM-PAC 6 Clicks Score (OT): 8  Modified [place] Scale: 4 - Moderately severe disability.  Unable to walk without assistance, and unable to attend to own bodily needs without assistance.    Jeanette Busby PT  2/5/2020

## 2020-02-05 NOTE — PROGRESS NOTES
INTENSIVIST / PULMONARY FOLLOW UP NOTE     Hospital:  LOS: 8 days   Ms. Yee Goodwin, 49 y.o. female is followed for:     Acute L frontal ICH 1/28/20    JAYESH (obstructive sleep apnea)    Tobacco abuse    Hypertensive emergency    Elevated transaminase levels    Dyslipidemia    Acute hypoxemic/hypercarbic respiratory failure due to ICH. Required Ventilatory support    S/P L frontal craniotomy and evacuation of intraparenchymal hematoma 1/30/20          SUBJECTIVE   More alert this morning    The patient's relevant past medical, surgical, family, and social history were reviewed    Allergies and medications were reviewed    ROS:  Per subjective, all other systems were reviewed and were negative        OBJECTIVE     Vital Sign Min/Max for last 24 hours:  Temp  Min: 98.6 °F (37 °C)  Max: 99.9 °F (37.7 °C)   BP  Min: 103/59  Max: 149/83   Pulse  Min: 75  Max: 112   Resp  Min: 16  Max: 20   SpO2  Min: 90 %  Max: 99 %   No data recorded     Physical Exam:  General Appearance:  Alert, no distress  Eyes:  No scleral icterus or pallor, pupils normal  Ears, Nose, Mouth, Throat:  Atraumatic, oropharynx clear  Neck:  Trachea midline, thyroid normal  Respiratory:  Clear to auscultation bilaterally, normal effort, no tenderness to palpation  Cardiovascular:  Regular rate and rhythm, no murmurs, no peripheral edema, no thrill  Gastrointestinal:  Soft, non-tender, non-distended, no hepatosplenomegaly  Skin:  Normal temperature, no rash  Psychiatric:  No agitation  Neuro:  Interval: (shift change)  1a. Level of Consciousness: 0-->Alert, keenly responsive  1b. LOC Questions: 2-->Answers neither question correctly  1c. LOC Commands: 0-->Performs both tasks correctly  2. Best Gaze: 0-->Normal  3. Visual: 2-->Complete hemianopia  4. Facial Palsy: 2-->Partial paralysis (total or near-total paralysis of lower face)  5a. Motor Arm, Left: 0-->No drift, limb holds 90 (or 45) degrees for full 10 secs  5b. Motor Arm, Right: 4-->No  movement  6a. Motor Leg, Left: 0-->No drift, leg holds 30 degree position for full 5 secs  6b. Motor Leg, Right: 4-->No movement  7. Limb Ataxia: 0-->Absent  8. Sensory: 1-->Mild-to-moderate sensory loss, patient feels pinprick is less sharp or is dull on the affected side, or there is a loss of superficial pain with pinprick, but patient is aware of being touched  9. Best Language: 3-->Mute, global aphasia, no usable speech or auditory comprehension  10. Dysarthria: 2-->Severe dysarthria, patients speech is so slurred as to be unintelligible in the absence of or out of proportion to any dysphasia, or is mute/anarthric  11. Extinction and Inattention (formerly Neglect): 1-->Visual, tactile, auditory, spatial, or personal inattention or extinction to bilateral simultaneous stimulation in one of the sensory modalities    Total (NIH Stroke Scale): 21      Telemetry:              Hemodynamics:   CVP:     PAP:     PAOP:     CO:     CI:     SVI:     SVR:       SpO2: 97 % SpO2  Min: 90 %  Max: 99 %   Device:      Flow Rate:   No data recorded       Intake/Ouptut 24 hrs (7:00AM - 6:59 AM)  Intake & Output (last 3 days)       02/02 0701 - 02/03 0700 02/03 0701 - 02/04 0700 02/04 0701 - 02/05 0700 02/05 0701 - 02/06 0700    I.V. (mL/kg) 1912.6 (28.2) 598.9 (9) 981.6 (14.8) 30 (0.5)    Other 20  20     NG/ 037 1826 208    IV Piggyback 50 100 100 50    Total Intake(mL/kg) 2150.6 (31.7) 1099.9 (16.6) 2341.6 (35.4) 288 (4.4)    Urine (mL/kg/hr) 1995 (1.2) 1850 (1.2) 2245 (1.4) 370 (1.3)    Stool   0     Total Output 1995 1850 2245 370    Net +155.6 -750.1 +96.6 -82            Stool Unmeasured Occurrence   1 x           Lines, Drains & Airways    Active LDAs     Name:   Placement date:   Placement time:   Site:   Days:    Peripheral IV 02/01/20 1200 Anterior;Right;Upper Arm   02/01/20    1200    Arm   2    Peripheral IV 02/01/20 1215 Anterior;Right;Upper Arm   02/01/20    1215    Arm   2    Gastrostomy/Enterostomy  Percutaneous endoscopic gastrostomy (PEG) LUQ   02/03/20    0750    LUQ   less than 1    External Urinary Catheter   02/02/20 2202    --   less than 1                Hematology:  Results from last 7 days   Lab Units 02/05/20  0559 02/04/20  0553 02/03/20  0418 02/02/20  0522 02/01/20  0354 01/31/20 0444 01/30/20  0439   WBC 10*3/mm3 14.30* 13.25* 16.90* 17.75* 17.15* 13.83* 12.66*   HEMOGLOBIN g/dL 11.7* 12.1 12.4 13.0 12.3 11.3* 11.9*   HEMATOCRIT % 35.4 35.6 35.9 38.9 38.5 34.3 36.2   PLATELETS 10*3/mm3 412 368 346 391 372 267 260     Electrolytes, Magnesium and Phosphorus:  Results from last 7 days   Lab Units 02/05/20  0559 02/04/20  0553 02/03/20  1642 02/03/20  0418 02/02/20  0522 02/01/20  0354 02/01/20  0001 01/31/20 0444 01/30/20  0439   SODIUM mmol/L 132* 131* 128* 125* 127* 131*  --  142  --  131*   CHLORIDE mmol/L 92* 91*  --  86* 88* 93*  --  103  --  93*   POTASSIUM mmol/L 4.1 3.7  --  3.8 3.8 3.4* 3.5 3.6   < > 3.3*   CO2 mmol/L 27.0 27.0  --  22.0 25.0 24.0  --  26.0  --  25.0   MAGNESIUM mg/dL 2.1 2.3  --  2.3  --  2.2  --  2.9*  --  2.4   PHOSPHORUS mg/dL 3.0 2.8  --  2.9 2.7 2.4* 2.2* 1.5*   < > 2.2*    < > = values in this interval not displayed.     Renal:  Results from last 7 days   Lab Units 02/05/20  0559 02/04/20  0553 02/03/20  0418 02/02/20  0522 02/01/20  0354 01/31/20 0444 01/30/20  0439   CREATININE mg/dL 0.35* 0.37* 0.32* 0.32* 0.35* 0.43* 0.40*   BUN mg/dL 14 15 12 13 10 15 9     Estimated Creatinine Clearance: 198.7 mL/min (A) (by C-G formula based on SCr of 0.35 mg/dL (L)).  Hepatic:  Results from last 7 days   Lab Units 02/05/20  0559 02/04/20  0553 02/03/20  0418 02/01/20  0354 01/31/20  0444   ALK PHOS U/L 133* 128* 117 120* 98   BILIRUBIN mg/dL 0.3 0.4 0.6 0.6 0.4   ALT (SGPT) U/L 64* 77* 94* 174* 153*   AST (SGOT) U/L 26 30 34* 74* 92*     Arterial Blood Gases:  Results from last 7 days   Lab Units 01/31/20  0957 01/31/20  0446 01/30/20  0338   PH, ARTERIAL pH units  7.506* 7.465* 7.476*   PCO2, ARTERIAL mm Hg 36.3 40.8 37.2   PO2 ART mm Hg 63.1* 67.4* 88.8   FIO2 % 35 30 40             No results found for: LACTATE    Relevant imaging studies and labs from 02/05/20 were reviewed and interpreted by me    Medications (drips):         carvedilol 25 mg Oral BID With Meals   ceFAZolin 1 g Intravenous Q8H   docusate sodium 100 mg Oral BID   heparin (porcine) 5,000 Units Subcutaneous Q8H   lisinopril 20 mg Oral Once   [START ON 2/6/2020] lisinopril 40 mg Oral Daily   pantoprazole 40 mg Intravenous Q24H   sodium chloride 10 mL Intravenous Q12H       Assessment/Plan   IMPRESSION / PLAN     Inpatient Problem List:  49 y.o.female:  Active Hospital Problems    Diagnosis   • **Acute L frontal ICH 1/28/20   • S/P L frontal craniotomy and evacuation of intraparenchymal hematoma 1/30/20   • Hypertensive emergency   • Elevated transaminase levels   • Dyslipidemia   • Acute hypoxemic/hypercarbic respiratory failure due to ICH. Required Ventilatory support   • JAYESH (obstructive sleep apnea)   • Tobacco abuse        Impression:  49 y.o.female with relevant PMH of HTN, HLD, JAYESH, Tobacco abuse admitted 1/28/2020 with left frontal ICH and hypertensive emergency.  NIH was 25 and volume of hemorrhage was 43 cc with surrounding edema and associated subarachnoid hemorrhage creating mass-effect on the left lateral ventricle with rightward shift of 7 mm.  CTA revealed atherosclerosis of bilateral carotid bifurcations with 80% right and 75% left luminal narrowing as well as moderate to severe stenoses and irregularities throughout the MCA distribution bilaterally.  No vascular malformations / aneurysms however were noted.  Patient required intubation for airway protection.  Underwent left frontal craniotomy and evacuation of intraparenchymal hematoma on 1/30/20.  Had PEG placed 2/3/20.    Plan:  ICH - plan per Neurosurgery, control BP.  Sodium improving / stable.     HTN - weaned off cardene with titration  of coreg / ace.  Ace dose increased today.    Urinary Retention - likely neurogenic, re-anchored bailey 2/4    Constipation - resolved    Fever - Surveillance cultures, cefazolin per Dr. Young    Nutrition - Tube feeds    SQ Heparin for prophylaxis - cleared with NS    To tele    Rehab vs SNF evaluation    Plan of care and goals reviewed with mulitdisciplinary team at daily rounds         Trino Mobley MD  Intensive Care Medicine  02/05/20 11:11 AM

## 2020-02-05 NOTE — PROGRESS NOTES
Adult Nutrition  Assessment/PES    Patient Name:  Yee Goodwin  YOB: 1970  MRN: [id number]  Admit Date:  1/28/2020    Assessment Date:  2/5/2020    Comments:  Pt tolerating EN support; TF infusing at goal rate; pt w/ 80% goal delivery over past 24 hs. RD will cont to monitor.    Reason for Assessment     Row Name 02/05/20 1513          Reason for Assessment    Reason For Assessment  per organizational policy;TF/PN;follow-up protocol MDR, EN support monitor 30 mins     Diagnosis  neurologic conditions pt adm w/ L frontal ICH s/p angiogram, s/p crani for evacuation of hematoma; pt extubated 1/30 s/p PEG 2/3)      Identified At Risk by Screening Criteria  difficulty chewing/swallowing;tube feeding or parenteral nutrition         Nutrition/Diet History     Row Name 02/05/20 1514          Nutrition/Diet History    Typical Food/Fluid Intake  RN reports pt tolerating TF well, bowel regimen worked; becoming more interactive , actually spoke 2 words today. Plan to transfer to floor then rehab. S.O. upset he is not POA , he wants to take her home.     Factors Affecting Nutritional Intake  difficulty/impaired swallowing;inability to feed self;impaired cognitive status/motor control           Labs/Tests/Procedures/Meds     Row Name 02/05/20 1519          Labs/Procedures/Meds    Lab Results Reviewed  reviewed, pertinent     Lab Results Comments  Na 132+        Diagnostic Tests/Procedures    Diagnostic Test/Procedure Reviewed  reviewed, pertinent        Medications    Pertinent Medications Reviewed  reviewed, pertinent     Pertinent Medications Comments  cardene off         Physical Findings     Row Name 02/05/20 1521          Physical Findings    Overall Physical Appearance  hemiplegia;on oxygen therapy     Tubes  PEG     Skin  surgical incision           Nutrition Prescription Ordered     Row Name 02/05/20 1521          Nutrition Prescription PO    Current PO Diet  NPO        Nutrition Prescription EN     Enteral Route  PEG     Product  Isosource 1.5 (Jevity 1.5)     Modulars  Liquid Protein (15 gm/30 mL)     Liquid Protein (15 gm/30 mL)  30 mL/1 packet     Protein Liquid Frequency  Daily     TF Delivery Method  Continuous     Continuous TF Goal Rate (mL/hr)  60 mL/hr     Continuous TF Goal Volume (mL)  1200 mL     Water flush (mL)   0 mL         Evaluation of Received Nutrient/Fluid Intake     Row Name 02/05/20 1523          Nutrient/Fluid Evaluation    Number of Days Evaluated  1 day        Calories Evaluation    Enteral Calories (kcal)  1452     Total Calories (kcal)  1452     % of Kcal Needs  80        Protein Evaluation    Enteral Protein (gm)  65     Total Protein (gm)  65     % of Protein Needs  57        Intake Assessment    Energy/Calorie Requirement Assessment  not meeting needs     Protein Requirement Assessment  not meeting needs     Fluid Requirement Assessment  not meeting needs     Tolerance  tolerating        Fluid Intake Evaluation    Enteral (Free Water) Fluid (mL)  751     Free Water Flush Fluid (mL)  292     Total Free Water Intake (mL)  1043 mL        Fiber Intake Evaluation    Fiber  11 gm        Recommended Daily Intake Evaluation    RDI  Not Met        EN Evaluation    Number of Days EN Intake Evaluated  1 day     EN Average Volume Delivered (mL/day)  968 mL/day     % Goal Volume   81 %           Problem/Interventions:  Problem 1     Row Name 02/05/20 1525          Nutrition Diagnoses Problem 1    Problem 1  Inadequate Intake/Infusion     Inadequate Intake Type  Oral;Enteral     Macronutrient  Kcal;Fluid;Protein     Micronutrient  Vitamin;Mineral     Etiology (related to)  Medical Diagnosis     Neurological  ICH;Craniotomy     Signs/Symptoms (evidenced by)  NPO;EN Intake Delivery     Percent (%) of EN goal  80 %           Intervention Goal     Row Name 02/05/20 1526          Intervention Goal    General  Nutrition support treatment;Meet nutritional needs for age/condition     TF/PN  Maintain  TF/PN;Tolerate TF at goal         Nutrition Intervention     Row Name 02/05/20 1526          Nutrition Intervention    RD/Tech Action  Follow Tx progress;Care plan reviewd         Nutrition Prescription     Row Name 02/05/20 1526          Nutrition Prescription EN    Enteral Prescription  Continue same protocol         Education/Evaluation     Row Name 02/05/20 1527          Monitor/Evaluation    Monitor  Per protocol;I&O;PO intake;TF delivery/tolerance;Weight;Symptoms;Pertinent labs           Electronically signed by:  Ivania Enriquez MS,RD,LD  02/05/20 3:29 PM

## 2020-02-05 NOTE — THERAPY TREATMENT NOTE
Acute Care - Speech Language Pathology   Swallow Progress Note [facility]     Patient Name: Yee Goodwin  : 1970  MRN: [id number]  Today's Date: 2020  Onset of Illness/Injury or Date of Surgery: 20     Referring Physician: Dr. Cole      Admit Date: 2020    Visit Dx:      ICD-10-CM ICD-9-CM   1. Acute intracerebral hemorrhage (CMS/HCC) I61.9 431   2. L frontal ICH  I62.9 432.9   3. Intracranial hemorrhage (CMS/HCC) I62.9 432.9   4. Dysphagia, unspecified type R13.10 787.20   5. Aphasia R47.01 784.3   6. Apraxia R48.2 784.69   7. Impaired mobility and ADLs Z74.09 799.89     Patient Active Problem List   Diagnosis   • History of right hip replacement   • PUD (peptic ulcer disease)   • S/P madan   • Essential hypertension   • Gastritis   • JAYESH (obstructive sleep apnea)   • Epigastric pain   • Diarrhea   • Diverticulosis   • Hiatal hernia   • Tobacco abuse   • Acute L frontal ICH 20   • Hypertensive emergency   • Elevated transaminase levels   • Dyslipidemia   • Acute hypoxemic/hypercarbic respiratory failure due to ICH. Required Ventilatory support   • Acute intracerebral hemorrhage (CMS/HCC)   • S/P L frontal craniotomy and evacuation of intraparenchymal hematoma 20       Therapy Treatment  Rehabilitation Treatment Summary     Row Name 2030             Treatment Time/Intention    Discipline  speech language pathologist  -VO      Document Type  therapy note (daily note)  -VO      Subjective Information  no complaints  -VO      Mode of Treatment  speech-language pathology  -VO      Care Plan Review  care plan/treatment goals reviewed;risks/benefits reviewed;current/potential barriers reviewed;patient/other agree to care plan  -VO      Therapy Frequency (Swallow)  5 days per week  -VO      Therapy Frequency (SLP SLC)  5 days per week  -VO      Patient Effort  good  -VO      Recorded by [VO] Isa Acevedo MA,CCC-SLP 20 1007      Row Name 20 0830              Pain Scale: FACES Pre/Post-Treatment    Pain: FACES Scale, Pretreatment  0-->no hurt  -VO      Pain: FACES Scale, Post-Treatment  0-->no hurt  -VO      Recorded by [VO] Isa Acevedo MA,CCC-SLP 02/05/20 1007      Row Name                Wound 01/30/20 1038 head Incision    Wound - Properties Group Date first assessed: 01/30/20 [TK] Time first assessed: 1038 [TK] Present on Hospital Admission: N [TK] Location: head [TK] Primary Wound Type: Incision [TK] Recorded by:  [TK] Rachel Jaime RN 01/30/20 1038    Row Name 02/05/20 0830             Outcome Summary/Treatment Plan (SLP)    Daily Summary of Progress (SLP)  progress toward functional goals is good  -VO      Plan for Continued Treatment (SLP)  Pt w/ improved ability to follow oral commands and making attempts to verbalize automatic sequences. Attends to R side w/ moderate verbal/tactile cues. Accepted ice chip, thins via cup, and puree. Multiple swallows but no coughing which is improvement. Will cont tx and assess for FEES readiness.  -VO      Anticipated Dischage Disposition  inpatient rehabilitation facility;anticipate therapy at next level of care  -VO      Recorded by [VO] Isa Acevedo MA,CCC-SLP 02/05/20 1007        User Key  (r) = Recorded By, (t) = Taken By, (c) = Cosigned By    Initials Name Effective Dates Discipline    TK Rachel Jaime RN 06/16/16 -  Nurse    VO Isa Acevedo MA,CCC-SLP 10/24/18 -  SLP          Outcome Summary  Outcome Summary/Treatment Plan (SLP)  Daily Summary of Progress (SLP): progress toward functional goals is good (02/05/20 0830 : Isa Acevedo MA,CCC-SLP)  Plan for Continued Treatment (SLP): Pt w/ improved ability to follow oral commands and making attempts to verbalize automatic sequences. Attends to R side w/ moderate verbal/tactile cues. Accepted ice chip, thins via cup, and puree. Multiple swallows but no coughing which is improvement. Will cont tx and assess for FEES readiness. (02/05/20 0830 :  Isa Acevedo MA,CCC-SLP)  Anticipated Dischage Disposition: inpatient rehabilitation facility, anticipate therapy at next level of care (02/05/20 0830 : Isa Acevedo MA,CCC-SLP)      SLP GOALS     Row Name 02/05/20 1000 02/05/20 0830 02/04/20 1100       Oral Nutrition/Hydration Goal 1 (SLP)    Oral Nutrition/Hydration Goal 1, SLP  --  LTG: Improve swallow function until can participate in instrumental swallow study without cues  -VO  LTG: Improve swallow function until can participate in instrumental swallow study without cues  -AV    Time Frame (Oral Nutrition/Hydration Goal 1, SLP)  --  by discharge  -VO  by discharge  -AV    Progress/Outcomes (Oral Nutrition/Hydration Goal 1, SLP)  --  good progress toward goal  -VO  continuing progress toward goal  -AV       Oral Nutrition/Hydration Goal 2 (SLP)    Oral Nutrition/Hydration Goal 2, SLP  --  Tolerate SLP PO trials without s/s aspiration with 50% accuracy and cues   -VO  Tolerate SLP PO trials without s/s aspiration with 50% accuracy and cues   -AV    Time Frame (Oral Nutrition/Hydration Goal 2, SLP)  --  short term goal (STG)  -VO  short term goal (STG)  -AV    Barriers (Oral Nutrition/Hydration Goal 2, SLP)  --  multiple swallows w/ all but no coughing this date  -VO  took several trials of ice with no overt s/s. Cough with thins.   -AV    Progress/Outcomes (Oral Nutrition/Hydration Goal 2, SLP)  --  continuing progress toward goal  -VO  continuing progress toward goal  -AV       Labial Strengthening Goal 1 (SLP)    Activity (Labial Strengthening Goal 1, SLP)  --  increase labial tone  -VO  increase labial tone  -AV    Increase Labial Tone  --  labial resistance exercises  -VO  labial resistance exercises  -AV    [place]/Accuracy (Labial Strengthening Goal 1, SLP)  --  with minimal cues (75-90% accuracy)  -VO  with minimal cues (75-90% accuracy)  -AV    Time Frame (Labial Strengthening Goal 1, SLP)  --  short term goal (STG)  -VO  short term  goal (STG)  -AV    Progress/Outcomes (Labial Strengthening Goal 1, SLP)  --  continuing progress toward goal  -VO  continuing progress toward goal  -AV       Lingual Strengthening Goal 1 (SLP)    Activity (Lingual Strengthening Goal 1, SLP)  --  increase lingual tone/sensation/control/coordination/movement  -VO  increase lingual tone/sensation/control/coordination/movement  -AV    Increase Lingual Tone/Sensation/Control/Coordination/Movement  --  lingual movement exercises;lingual resistance exercises  -VO  lingual movement exercises;lingual resistance exercises  -AV    [place]/Accuracy (Lingual Strengthening Goal 1, SLP)  --  with minimal cues (75-90% accuracy)  -VO  with minimal cues (75-90% accuracy)  -AV    Time Frame (Lingual Strengthening Goal 1, SLP)  --  short term goal (STG)  -VO  short term goal (STG)  -AV    Progress/Outcomes (Lingual Strengthening Goal 1, SLP)  --  continuing progress toward goal  -VO  continuing progress toward goal  -AV       Comprehend Questions Goal 1 (SLP)    Improve Ability to Comprehend Questions Goal 1 (SLP)  simple yes/no questions;100%;with minimal cues (75-90%)  -VO  --  simple yes/no questions;100%;with minimal cues (75-90%)  -AV    Time Frame (Comprehend Questions Goal 1, SLP)  short term goal (STG)  -VO  --  short term goal (STG)  -AV    Progress (Ability to Comprehend Questions Goal 1, SLP)  60%;with minimal cues (75-90%)  -VO  --  50%;with minimal cues (75-90%)  -AV    Progress/Outcomes (Comprehend Questions Goal 1, SLP)  continuing progress toward goal  -VO  --  continuing progress toward goal  -AV    Comment (Comprehend Questions Goal 1, SLP)  using thumbs up/down  -VO  --  --       Follow Directions Goal 2 (SLP)    Improve Ability to Follow Directions Goal 1 (SLP)  1 step direction without objects;60%;with moderate cues (50-74%)  -VO  --  1 step direction without objects;60%;with moderate cues (50-74%)  -AV    Time Frame (Follow Directions Goal 1, SLP)  short term  goal (STG);by discharge  -VO  --  short term goal (STG);by discharge  -AV    Progress (Ability to Follow Directions Goal 1, SLP)  70%;with minimal cues (75-90%)  -VO  --  60%;with minimal cues (75-90%)  -AV    Progress/Outcomes (Follow Directions Goal 1, SLP)  continuing progress toward goal  -VO  --  continuing progress toward goal  -AV       Word Retrieval Skills Goal 1 (SLP)    Improve Word Retrieval Skills By Goal 1 (SLP)  completing automatic speech task, counting;repeating sounds;completing open ended structured sentence;50%;with moderate cues (50-74%)  -VO  --  --    Time Frame (Word Retrieval Goal 1, SLP)  short term goal (STG)  -VO  --  --    Progress (Word Retrieval Skills Goal 1, SLP)  30%;with moderate cues (50-74%)  -VO  --  --    Progress/Outcomes (Word Retrieval Goal 1, SLP)  continuing progress toward goal  -VO  --  --    Comment (Word Retrieval Goal 1, SLP)  several verbalization attempts including vowel imitation, counting, and completing phrases  -VO  --  --       Motor Planning Goal 1 (SLP)    Improve Motor Planning to Reduce Apraxia by Goal 1 (SLP)  imitating mouth postures;imitating vowels;following isolated oral commands;50%;with moderate cues (50-74%)  -VO  --  --    Time Frame (Motor Planning Goal 1, SLP)  short term goal (STG)  -VO  --  --    Progress (Motor Planning Goal 1, SLP)  50%;with minimal cues (75-90%)  -VO  --  --    Progress/Outcomes (Motor Planning Goal 1, SLP)  continuing progress toward goal  -VO  --  --       Augmentative/Alternative Communication Objectives Goal 1 (SLP    Communication (Augmentative/Alternative Communication Goal 1, SLP)  improve ability to use non-verbal communication strategies  -VO  --  --    Improve Communication by (Augmentative/Alternative Communication Goal 1, SLP)  use gestures to communicate;alphabet/picture board;60%;with minimal cues (75-90%)  -VO  --  --    Time Frame (Augmentative/Alternative Communication Goal 1, SLP)  short term goal (STG)   -VO  --  --    Progress (Augmentative/Alternative Communication Goal 1, SLP)  50%;with minimal cues (75-90%)  -VO  --  --    Progress/Outcomes (Augmentative/Alternative Communication Goal 1, SLP)  good progress toward goal  -VO  --  --       Additional Goal 1 (SLP)    Additional Goal 1, SLP  LTG: Improve communication in order to participate in care while in hospital setting with 80% accuracy and cues  -VO  --  --    Time Frame (Additional Goal 1, SLP)  by discharge  -VO  --  --    Progress/Outcomes (Additional Goal 1, SLP)  good progress toward goal  -VO  --  --    Row Name 02/02/20 1100             Oral Nutrition/Hydration Goal 1 (SLP)    Oral Nutrition/Hydration Goal 1, SLP  LTG: Improve swallow function until can participate in instrumental swallow study without cues  -VO      Time Frame (Oral Nutrition/Hydration Goal 1, SLP)  by discharge  -VO      Progress/Outcomes (Oral Nutrition/Hydration Goal 1, SLP)  continuing progress toward goal  -VO         Oral Nutrition/Hydration Goal 2 (SLP)    Oral Nutrition/Hydration Goal 2, SLP  Tolerate SLP PO trials without s/s aspiration with 50% accuracy and cues   -VO      Time Frame (Oral Nutrition/Hydration Goal 2, SLP)  short term goal (STG)  -VO      Progress/Outcomes (Oral Nutrition/Hydration Goal 2, SLP)  continuing progress toward goal  -VO         Labial Strengthening Goal 1 (SLP)    Activity (Labial Strengthening Goal 1, SLP)  increase labial tone  -VO      Increase Labial Tone  labial resistance exercises  -VO      [place]/Accuracy (Labial Strengthening Goal 1, SLP)  with minimal cues (75-90% accuracy)  -VO      Time Frame (Labial Strengthening Goal 1, SLP)  short term goal (STG)  -VO      Progress/Outcomes (Labial Strengthening Goal 1, SLP)  continuing progress toward goal  -VO         Lingual Strengthening Goal 1 (SLP)    Activity (Lingual Strengthening Goal 1, SLP)  increase lingual tone/sensation/control/coordination/movement  -VO      Increase Lingual  Tone/Sensation/Control/Coordination/Movement  lingual movement exercises;lingual resistance exercises  -VO      [place]/Accuracy (Lingual Strengthening Goal 1, SLP)  with minimal cues (75-90% accuracy)  -VO      Time Frame (Lingual Strengthening Goal 1, SLP)  short term goal (STG)  -VO      Progress/Outcomes (Lingual Strengthening Goal 1, SLP)  continuing progress toward goal  -VO         Pharyngeal Strengthening Exercise Goal 1 (SLP)    Activity (Pharyngeal Strengthening Goal 1, SLP)  increase pharyngeal sensation;increase timing;increase anterior movement of the hyolaryngeal complex;increase closure at entrance to airway/closure of airway at glottis;increase squeeze/positive pressure generation  -VO      Increase Pharyngeal Sensation  gustatory stimulation (sour/cold)  -VO      Increase Timing  prepping - 3 second prep or suck swallow or 3-step swallow  -VO      Increase Anterior Movement of the Hyolaryngeal Complex  chin tuck against resistance (CTAR)  -VO      Increase Closure at Entrance to Airway/Closure of Airway at Glottis  breath hold exercises  -VO      Increase Squeeze/Positive Pressure Generation  hard effortful swallow  -VO      [place]/Accuracy (Pharyngeal Strengthening Goal 1, SLP)  with minimal cues (75-90% accuracy)  -VO      Time Frame (Pharyngeal Strengthening Goal 1, SLP)  short term goal (STG)  -VO      Progress/Outcomes (Pharyngeal Strengthening Goal 1, SLP)  continuing progress toward goal  -VO        User Key  (r) = Recorded By, (t) = Taken By, (c) = Cosigned By    Initials Name Provider Type    Wandy Harris MS CCC-SLP Speech and Language Pathologist    Isa Hitchcock MA,CCC-SLP Speech and Language Pathologist          EDUCATION  The patient has been educated in the following areas:   Cognitive Impairment Communication Impairment Dysphagia (Swallowing Impairment) Oral Care/Hydration NPO rationale.    SLP Recommendation and Plan  Daily Summary of Progress  (SLP): progress toward functional goals is good     Plan for Continued Treatment (SLP): Pt w/ improved ability to follow oral commands and making attempts to verbalize automatic sequences. Attends to R side w/ moderate verbal/tactile cues. Accepted ice chip, thins via cup, and puree. Multiple swallows but no coughing which is improvement. Will cont tx and assess for FEES readiness.  Anticipated Dischage Disposition: inpatient rehabilitation facility, anticipate therapy at next level of care                    Time Calculation:   Time Calculation- SLP     Row Name 02/05/20 1010             Time Calculation- SLP    SLP Start Time  0830  -VO      SLP Received On  02/05/20  -VO        User Key  (r) = Recorded By, (t) = Taken By, (c) = Cosigned By    Initials Name Provider Type    VO Isa Acevedo MA,CCC-SLP Speech and Language Pathologist          Therapy Charges for Today     Code Description Service Date Service Provider Modifiers Qty    49059967341 HC ST TREATMENT SWALLOW 2 2/5/2020 Isa Acevedo MA,CCC-SLP GN 1    [id number] HC ST TREATMENT SPEECH 2 2/5/2020 Isa Acevedo MA,CCC-SLP GN 1                 Isa Acevedo MA,LISA-SLP  2/5/2020

## 2020-02-05 NOTE — PLAN OF CARE
Problem: Patient Care Overview  Goal: Plan of Care Review  2/5/2020 1708 by Yasemin Irizarry RN  Outcome: Ongoing (interventions implemented as appropriate)  Flowsheets (Taken 2/5/2020 1708)  Plan of Care Reviewed With: patient; significant other   Patient remains with right side neglect, mute, unable to spontaneously move rt side, rt facial droop. Patient remains on 2L NC and has loose nonproductive cough. PEG intact and tolerating tube feeding. Low grade temp today of 99ax. Patient has been off IV blood pressure medication since 2/4. Orders to the floor.

## 2020-02-05 NOTE — PLAN OF CARE
Problem: Patient Care Overview  Goal: Plan of Care Review  Outcome: Ongoing (interventions implemented as appropriate)  Flowsheets (Taken 2/5/2020 1010)  Plan of Care Reviewed With: patient  Note:   SLP treatment completed. Will continue to address dysphagia/communication. Improvement w/ both today, will cont to assess for FEES readiness. Please see note for further details and recommendations.

## 2020-02-06 LAB
ALBUMIN SERPL-MCNC: 3.5 G/DL (ref 3.5–5.2)
ALBUMIN/GLOB SERPL: 0.9 G/DL
ALP SERPL-CCNC: 132 U/L (ref 39–117)
ALT SERPL W P-5'-P-CCNC: 58 U/L (ref 1–33)
ANION GAP SERPL CALCULATED.3IONS-SCNC: 15 MMOL/L (ref 5–15)
AST SERPL-CCNC: 37 U/L (ref 1–32)
BASOPHILS # BLD AUTO: 0.12 10*3/MM3 (ref 0–0.2)
BASOPHILS NFR BLD AUTO: 0.8 % (ref 0–1.5)
BILIRUB SERPL-MCNC: 0.3 MG/DL (ref 0.2–1.2)
BUN BLD-MCNC: 17 MG/DL (ref 6–20)
BUN/CREAT SERPL: 44.7 (ref 7–25)
CALCIUM SPEC-SCNC: 9.2 MG/DL (ref 8.6–10.5)
CHLORIDE SERPL-SCNC: 90 MMOL/L (ref 98–107)
CO2 SERPL-SCNC: 24 MMOL/L (ref 22–29)
CREAT BLD-MCNC: 0.38 MG/DL (ref 0.57–1)
CRP SERPL-MCNC: 2.41 MG/DL (ref 0–0.5)
DEPRECATED RDW RBC AUTO: 41.3 FL (ref 37–54)
EOSINOPHIL # BLD AUTO: 0.32 10*3/MM3 (ref 0–0.4)
EOSINOPHIL NFR BLD AUTO: 2.2 % (ref 0.3–6.2)
ERYTHROCYTE [DISTWIDTH] IN BLOOD BY AUTOMATED COUNT: 12.1 % (ref 12.3–15.4)
GFR SERPL CREATININE-BSD FRML MDRD: >150 ML/MIN/1.73
GLOBULIN UR ELPH-MCNC: 3.9 GM/DL
GLUCOSE BLD-MCNC: 151 MG/DL (ref 65–99)
GLUCOSE BLDC GLUCOMTR-MCNC: 166 MG/DL (ref 70–130)
GLUCOSE BLDC GLUCOMTR-MCNC: 181 MG/DL (ref 70–130)
HCT VFR BLD AUTO: 36.1 % (ref 34–46.6)
HGB BLD-MCNC: 11.8 G/DL (ref 12–15.9)
IMM GRANULOCYTES # BLD AUTO: 0.29 10*3/MM3 (ref 0–0.05)
IMM GRANULOCYTES NFR BLD AUTO: 2 % (ref 0–0.5)
LYMPHOCYTES # BLD AUTO: 2.34 10*3/MM3 (ref 0.7–3.1)
LYMPHOCYTES NFR BLD AUTO: 16.2 % (ref 19.6–45.3)
MAGNESIUM SERPL-MCNC: 2 MG/DL (ref 1.6–2.6)
MCH RBC QN AUTO: 30.5 PG (ref 26.6–33)
MCHC RBC AUTO-ENTMCNC: 32.7 G/DL (ref 31.5–35.7)
MCV RBC AUTO: 93.3 FL (ref 79–97)
MONOCYTES # BLD AUTO: 1.47 10*3/MM3 (ref 0.1–0.9)
MONOCYTES NFR BLD AUTO: 10.2 % (ref 5–12)
NEUTROPHILS # BLD AUTO: 9.94 10*3/MM3 (ref 1.7–7)
NEUTROPHILS NFR BLD AUTO: 68.6 % (ref 42.7–76)
NRBC BLD AUTO-RTO: 0 /100 WBC (ref 0–0.2)
PHOSPHATE SERPL-MCNC: 3.6 MG/DL (ref 2.5–4.5)
PLATELET # BLD AUTO: 468 10*3/MM3 (ref 140–450)
PMV BLD AUTO: 9.5 FL (ref 6–12)
POTASSIUM BLD-SCNC: 4.2 MMOL/L (ref 3.5–5.2)
PROT SERPL-MCNC: 7.4 G/DL (ref 6–8.5)
RBC # BLD AUTO: 3.87 10*6/MM3 (ref 3.77–5.28)
SODIUM BLD-SCNC: 129 MMOL/L (ref 136–145)
WBC NRBC COR # BLD: 14.48 10*3/MM3 (ref 3.4–10.8)

## 2020-02-06 PROCEDURE — 25010000003 CEFAZOLIN 1-4 GM/50ML-% SOLUTION: Performed by: SURGERY

## 2020-02-06 PROCEDURE — 80053 COMPREHEN METABOLIC PANEL: CPT | Performed by: INTERNAL MEDICINE

## 2020-02-06 PROCEDURE — 99024 POSTOP FOLLOW-UP VISIT: CPT | Performed by: PHYSICIAN ASSISTANT

## 2020-02-06 PROCEDURE — 99232 SBSQ HOSP IP/OBS MODERATE 35: CPT | Performed by: HOSPITALIST

## 2020-02-06 PROCEDURE — 25010000002 HEPARIN (PORCINE) PER 1000 UNITS: Performed by: INTERNAL MEDICINE

## 2020-02-06 PROCEDURE — 82962 GLUCOSE BLOOD TEST: CPT

## 2020-02-06 PROCEDURE — 97110 THERAPEUTIC EXERCISES: CPT

## 2020-02-06 PROCEDURE — 85025 COMPLETE CBC W/AUTO DIFF WBC: CPT | Performed by: INTERNAL MEDICINE

## 2020-02-06 PROCEDURE — 97530 THERAPEUTIC ACTIVITIES: CPT

## 2020-02-06 PROCEDURE — 86140 C-REACTIVE PROTEIN: CPT | Performed by: SURGERY

## 2020-02-06 PROCEDURE — 84100 ASSAY OF PHOSPHORUS: CPT | Performed by: INTERNAL MEDICINE

## 2020-02-06 PROCEDURE — 83735 ASSAY OF MAGNESIUM: CPT | Performed by: INTERNAL MEDICINE

## 2020-02-06 RX ADMIN — CEFAZOLIN SODIUM 1 G: 1 INJECTION, SOLUTION INTRAVENOUS at 22:21

## 2020-02-06 RX ADMIN — ACETAMINOPHEN 650 MG: 325 TABLET, FILM COATED ORAL at 12:05

## 2020-02-06 RX ADMIN — DOCUSATE SODIUM 100 MG: 50 LIQUID ORAL at 08:51

## 2020-02-06 RX ADMIN — CARVEDILOL 25 MG: 12.5 TABLET, FILM COATED ORAL at 08:51

## 2020-02-06 RX ADMIN — LISINOPRIL 40 MG: 40 TABLET ORAL at 08:51

## 2020-02-06 RX ADMIN — Medication 30 ML: at 08:52

## 2020-02-06 RX ADMIN — ACETAMINOPHEN 650 MG: 325 TABLET, FILM COATED ORAL at 00:26

## 2020-02-06 RX ADMIN — CARVEDILOL 25 MG: 12.5 TABLET, FILM COATED ORAL at 17:58

## 2020-02-06 RX ADMIN — CEFAZOLIN SODIUM 1 G: 1 INJECTION, SOLUTION INTRAVENOUS at 05:56

## 2020-02-06 RX ADMIN — HEPARIN SODIUM 5000 UNITS: 5000 INJECTION INTRAVENOUS; SUBCUTANEOUS at 22:21

## 2020-02-06 RX ADMIN — SODIUM CHLORIDE, PRESERVATIVE FREE 10 ML: 5 INJECTION INTRAVENOUS at 08:51

## 2020-02-06 RX ADMIN — PANTOPRAZOLE SODIUM 40 MG: 40 INJECTION, POWDER, FOR SOLUTION INTRAVENOUS at 08:51

## 2020-02-06 RX ADMIN — HEPARIN SODIUM 5000 UNITS: 5000 INJECTION INTRAVENOUS; SUBCUTANEOUS at 05:52

## 2020-02-06 RX ADMIN — CEFAZOLIN SODIUM 1 G: 1 INJECTION, SOLUTION INTRAVENOUS at 14:18

## 2020-02-06 RX ADMIN — HEPARIN SODIUM 5000 UNITS: 5000 INJECTION INTRAVENOUS; SUBCUTANEOUS at 14:18

## 2020-02-06 NOTE — CONSULTS
Clinical Nutrition     Nutrition Support Followup  Reason for Visit:   RN consult, EN    Patient Name: Yee Goodwin  YOB: 1970  MRN: [id number]  Date of Encounter: 02/06/20 2:06 PM  Admission date: 1/28/2020    Comments: Consult received for EN reassessment following transition from ICU. EN rate adjusted to 55 mL/hr for TGV of 1210 mL. RD notes hyponatremia. Currently, no free water added to regimen. At goal rate, EN volume provides <1L of total H2O daily. RD to follow up for full EN evaluation per protocol.    Current Nutrition Prescription     PO: NPO Diet  EN: Diet, Tube Feeding Tube Feeding Formula: Isosource 1.5 (Calorically Dense)  Goal rate: 60 mL/hr, TGV = 1200 mL  Route: PEG    Nutrition Intervention   1.  Follow treatment progress, Care plan reviewed  2. RD recommends continue same regimen, Isosource 1.5, with a decrease in hourly rate to 55 mL/hr per telemetry protocol.    Route: PEG     At Target Goal Volume     % Est needs   Volume  1210 ml     Modular x1 Prostat    Energy/kcals 1915 kcals 106%   Protein 97 g pro 98%   Fiber 18 g         Fluid via  mL    Total Fluid  941 mL    Meets 100% RDI Yes      3. RD to continue to monitor patient pertinent labs, fluid status, and adjust nutrition support regimen as medically appropriate.    Goal:   General: Nutrition support treatment  EN/PN: Deliver estimated needs    Monitoring/Evaluation:   Per protocol, I&O, Pertinent labs, EN delivery/tolerance, Symptoms    Will Continue to follow per protocol    Nika Perez RDN, LD  Time Spent: 25 minutes

## 2020-02-06 NOTE — PLAN OF CARE
Problem: Patient Care Overview  Goal: Plan of Care Review  Flowsheets (Taken 2/6/2020 1611)  Progress: improving  Plan of Care Reviewed With: patient  Outcome Summary: lift to chair, STS from chair x4 reps, Pt improving mobility.  mechanical lift to chair.  Mod-A x2 to stand with blocking of R knee, once standing, fluctuated from min-A to CGA, pt's left side does well with holding up body weight with BUE support once proper balance achieved.

## 2020-02-06 NOTE — PROGRESS NOTES
"NEUROSURGERY PROGRESS NOTE    Interval History:   Hospital day 9 postoperative day 7 status post left frontal craniotomy for evacuation of intraparenchymal hematoma.  Patient was transferred out of the ICU to the medical floor last night.  Neurologic exam is unchanged.  NIH 23 per nursing report.     Vital Signs  Blood pressure 132/73, pulse 93, temperature 98.8 °F (37.1 °C), temperature source Oral, resp. rate 16, height 167.6 cm (66\"), weight 66.2 kg (145 lb 15.1 oz), SpO2 98 %.    Physical Exam:  Patient is awake in sitting up in bed in no apparent distress  She follows commands in her right upper and lower extremity  Her surgical incision is clean, dry and intact.  There is no erythema, fluctuance or drainage     Results Review:    I/O last 3 completed shifts:  In: 2096.2 [I.V.:123.1; Other:50; NG/GT:1673; IV Piggyback:250.1]  Out: 2420 [Urine:2420]  I/O this shift:  In: 477 [NG/GT:477]  Out: -       Assessment/Plan:   Status post left frontal craniotomy  We will sign off and can see the patient as needed.    The patient may be discharged from neurosurgical standpoint to rehab versus SNF when medically ready  There is glue over the patient's surgical incision.  No sutures to be removed.  Patient's hair may be washed carefully without soaking or scrubbing incision.  Keep covered as the patient has tendency to pick at the incision.  Follow-up with neurosurgery in 1 month    Kathy Del Real PA-C  02/06/20  9:39 AM    "

## 2020-02-06 NOTE — THERAPY TREATMENT NOTE
Patient Name: Yee Goodwin  : 1970    MRN: [id number]                              Today's Date: 2020       Admit Date: 2020    Visit Dx:     ICD-10-CM ICD-9-CM   1. Acute intracerebral hemorrhage (CMS/HCC) I61.9 431   2. L frontal ICH  I62.9 432.9   3. Intracranial hemorrhage (CMS/HCC) I62.9 432.9   4. Dysphagia, unspecified type R13.10 787.20   5. Aphasia R47.01 784.3   6. Apraxia R48.2 784.69   7. Impaired mobility and ADLs Z74.09 799.89     Patient Active Problem List   Diagnosis   • History of right hip replacement   • PUD (peptic ulcer disease)   • S/P madan   • Essential hypertension   • Gastritis   • JAYESH (obstructive sleep apnea)   • Epigastric pain   • Diarrhea   • Diverticulosis   • Hiatal hernia   • Tobacco abuse   • Acute L frontal ICH 20   • Hypertensive emergency   • Elevated transaminase levels   • Dyslipidemia   • Acute hypoxemic/hypercarbic respiratory failure due to ICH. Required Ventilatory support   • Acute intracerebral hemorrhage (CMS/HCC)   • S/P L frontal craniotomy and evacuation of intraparenchymal hematoma 20     Past Medical History:   Diagnosis Date   • Arthritis    • Diverticulitis    • GERD (gastroesophageal reflux disease)    • Hypertension      Past Surgical History:   Procedure Laterality Date   • CEREBRAL ANGIOGRAM N/A 2020    Procedure: Cerebral angiogram;  Surgeon: Merlin Black MD;  Location: [facility] CATH INVASIVE LOCATION;  Service: Interventional Radiology   • CHOLECYSTECTOMY     • COLONOSCOPY     • CRANIOTOMY FOR SUBDURAL HEMATOMA Left 2020    Procedure: FRONTAL CRANIOTOMY FOR EVACUATION OF INTRAPARENCHYMAL HEMATOMA  LEFT;  Surgeon: Jose Cole MD;  Location: [facility] OR;  Service: Neurosurgery;  Laterality: Left;   • ENDOSCOPY W/ PEG TUBE PLACEMENT N/A 2/3/2020    Procedure: ESOPHAGOGASTRODUODENOSCOPY WITH PERCUTANEOUS ENDOSCOPIC GASTROSTOMY TUBE INSERTION AT BEDSIDE;  Surgeon: Andrew Young MD;  Location: [facility]  ENDOSCOPY;  Service: General;  Laterality: N/A;   • HAND TENDON SURGERY Right     patient hand right thumb tendon surgery   • HIP SURGERY     • JOINT REPLACEMENT     • TUBAL ABDOMINAL LIGATION       General Information     Row Name 02/06/20 1603          PT Evaluation Time/Intention    Document Type  therapy note (daily note)  -KG     Mode of Treatment  individual therapy;physical therapy  -     Row Name 02/06/20 1603          General Information    Patient Profile Reviewed?  yes  -KG     Existing Precautions/Restrictions  fall;oxygen therapy device and L/min;other (see comments) R sided weakness/ neglect, peg tube, aphasia (non-verbal)  -KG     Row Name 02/06/20 1603          Cognitive Assessment/Intervention- PT/OT    Orientation Status (Cognition)  oriented to;person  -KG     Row Name 02/06/20 1603          Safety Issues, Functional Mobility    Impairments Affecting Function (Mobility)  balance;cognition;coordination;endurance/activity tolerance;strength;visual/perceptual;postural/trunk control;range of motion (ROM);motor control;grasp  -KG       User Key  (r) = Recorded By, (t) = Taken By, (c) = Cosigned By    Initials Name Provider Type    KG Fabienne Otto Physical Therapist        Mobility     Row Name 02/06/20 1604          Bed Mobility Assessment/Treatment    Bed Mobility Assessment/Treatment  supine-sit  -KG     Rolling Left [place] (Bed Mobility)  maximum assist (25% patient effort);2 person assist  -KG     Rolling Right [place] (Bed Mobility)  moderate assist (50% patient effort);2 person assist  -KG     Scooting/Bridging Satin (Bed Mobility)  maximum assist (25% patient effort);2 person assist;verbal cues  -KG     Supine-Sit [place] (Bed Mobility)  maximum assist (25% patient effort);2 person assist;verbal cues  -KG     Sit-Supine [place] (Bed Mobility)  maximum assist (25% patient effort);2 person assist;verbal cues  -KG     Assistive Device (Bed Mobility)  bed  rails;draw sheet;head of bed elevated  -KG     Comment (Bed Mobility)  VC for sequencing, pt able to utilize L side to help roll R  -KG     Row Name 02/06/20 1604          Transfer Assessment/Treatment    Comment (Transfers)  lift to chair, STS from chair x4 reps, Mod-A x2 to stand with blocking of R knee, once standing, fluctuated from min-A to CGA, pt's left side does well with holding up body weight with BUE support once proper balance achieved.   -KG     Row Name 02/06/20 1604          Sit-Stand Transfer    Sit-Stand [place] (Transfers)  moderate assist (50% patient effort);2 person assist  -KG     Assistive Device (Sit-Stand Transfers)  other (see comments)  -KG       User Key  (r) = Recorded By, (t) = Taken By, (c) = Cosigned By    Initials Name Provider Type    Fabienne Yi Physical Therapist        Obj/Interventions     Row Name 02/06/20 1607          Therapeutic Exercise    Lower Extremity (Therapeutic Exercise)  SLR (straight leg raise), left;SLR (straight leg raise), bilateral  -KG     Lower Extremity Range of Motion (Therapeutic Exercise)  ankle dorsiflexion/plantar flexion, bilateral;knee flexion/extension, bilateral  -KG     Exercise Type (Therapeutic Exercise)  AROM (active range of motion);PROM (passive range of motion)  -KG     Position (Therapeutic Exercise)  supine  -KG     Sets/Reps (Therapeutic Exercise)  x10  -KG     Expected Outcome (Therapeutic Exercise)  facilitate normal movement patterns;improve functional stability  -KG     Row Name 02/06/20 1607          Static Sitting Balance    Level of [place] (Unsupported Sitting, Static Balance)  moderate assist, 50 to 74% patient effort  -KG     Sitting Position (Unsupported Sitting, Static Balance)  sitting in chair  -KG     Time Able to Maintain Position (Unsupported Sitting, Static Balance)  1 to 2 minutes  -KG     Comment (Unsupported Sitting, Static Balance)  progressed to moments of SBA, pt tends to lean R  -KG     Row Name  02/06/20 1607          Static Standing Balance    Level of [place] (Supported Standing, Static Balance)  moderate assist, 50 to 74% patient effort;contact guard assist  -KG     Time Able to Maintain Position (Supported Standing, Static Balance)  15 to 30 seconds  -KG       User Key  (r) = Recorded By, (t) = Taken By, (c) = Cosigned By    Initials Name Provider Type    Fabienne Yi Physical Therapist        Goals/Plan    No documentation.       Clinical Impression     Row Name 02/06/20 1611          Pain Assessment    Additional Documentation  Pain Scale: FACES Pre/Post-Treatment (Group)  -KG     Row Name 02/06/20 1611          Pain Scale: FACES Pre/Post-Treatment    Pain: FACES Scale, Pretreatment  0-->no hurt  -KG     Pain: FACES Scale, Post-Treatment  0-->no hurt  -KG     Row Name 02/06/20 1611          Plan of Care Review    Plan of Care Reviewed With  patient  -KG     Progress  improving  -KG     Outcome Summary  lift to chair, STS from chair x4 reps, Pt improving mobility.  mechanical lift to chair.  Mod-A x2 to stand with blocking of R knee, once standing, fluctuated from min-A to CGA, pt's left side does well with holding up body weight with BUE support once proper balance achieved.  -KG     Row Name 02/06/20 1611          Vital Signs    Pre Systolic BP Rehab  99  -KG     Pre Treatment Diastolic BP  56  -KG     Post Systolic BP Rehab  105  -KG     Post Treatment Diastolic BP  68  -KG     Pretreatment Heart Rate (beats/min)  90  -KG     Posttreatment Heart Rate (beats/min)  96  -KG     Row Name 02/06/20 1611          Positioning and Restraints    Pre-Treatment Position  in bed  -KG     Post Treatment Position  chair  -KG     In Chair  notified nsg;call light within reach;encouraged to call for assist;exit alarm on;with family/caregiver  -KG       User Key  (r) = Recorded By, (t) = Taken By, (c) = Cosigned By    Initials Name Provider Type    Fabienne Yi Physical Therapist        Outcome  Measures     Row Name 02/06/20 1613          How much help from another person do you currently need...    Turning from your back to your side while in flat bed without using bedrails?  2  -KG     Moving from lying on back to sitting on the side of a flat bed without bedrails?  2  -KG     Moving to and from a bed to a chair (including a wheelchair)?  2  -KG     Standing up from a chair using your arms (e.g., wheelchair, bedside chair)?  2  -KG     Climbing 3-5 steps with a railing?  1  -KG     To walk in hospital room?  1  -KG     AM-PAC 6 Clicks Score (PT)  10  -KG     Row Name 02/06/20 1613          Functional Assessment    Outcome Measure Options  AM-PAC 6 Clicks Basic Mobility (PT)  -KG       User Key  (r) = Recorded By, (t) = Taken By, (c) = Cosigned By    Initials Name Provider Type    Fabienne Yi Physical Therapist          PT Recommendation and Plan     Plan of Care Reviewed With: patient  Progress: improving  Outcome Summary: lift to chair, STS from chair x4 reps, Pt improving mobility.  mechanical lift to chair.  Mod-A x2 to stand with blocking of R knee, once standing, fluctuated from min-A to CGA, pt's left side does well with holding up body weight with BUE support once proper balance achieved.     Time Calculation:   PT Charges     Row Name 02/06/20 1614             Time Calculation    Start Time  1415  -KG      PT Received On  02/06/20  -KG      PT Goal Re-Cert Due Date  02/11/20  -KG         Time Calculation- PT    Total Timed Code Minutes- PT  29 minute(s)  -KG         Timed Charges    94088 - PT Therapeutic Exercise Minutes  9  -KG      37751 - PT Therapeutic Activity Minutes  20  -KG        User Key  (r) = Recorded By, (t) = Taken By, (c) = Cosigned By    Initials Name Provider Type    Fabienne Yi Physical Therapist        Therapy Charges for Today     Code Description Service Date Service Provider Modifiers Qty    87733668941 HC PT THER PROC EA 15 MIN 2/6/2020 Fabienne Otto  GP 1    [id number]  PT THERAPEUTIC ACT EA 15 MIN 2/6/2020 Fabienne Otto GP 1          PT G-Codes  Outcome Measure Options: AM-PAC 6 Clicks Basic Mobility (PT)  AM-PAC 6 Clicks Score (PT): 10  AM-PAC 6 Clicks Score (OT): 8  Modified Rachell Scale: 4 - Moderately severe disability.  Unable to walk without assistance, and unable to attend to own bodily needs without assistance.    Fabienne Otto  2/6/2020

## 2020-02-06 NOTE — PLAN OF CARE
Arrrived from 2bicu approx midnight this shift.  Pt awake and alert.  Remains nonverbal. Follows commands.  Nih 23.  Right side remains flaccid.  Ryan catheter intact.  Isosource 1.5 tube feeding continues via peg tube.  Peg tube site cleaned with warm soap water. Sutures intact.  Tele nsr. Dressing to left frontal clean and dry.  Family at bedside and supportive.  Vss. Cont to monitor

## 2020-02-06 NOTE — PLAN OF CARE
Problem: Fall Risk (Adult)  Goal: Absence of Fall  Outcome: Ongoing (interventions implemented as appropriate)  Flowsheets (Taken 2/6/2020 5916)  Absence of Fall: achieves outcome

## 2020-02-06 NOTE — PROGRESS NOTES
Continued Stay Note  [facility]     Patient Name: Yee Goodwin  MRN: [id number]  Today's Date: 2/6/2020    Admit Date: 1/28/2020    Discharge Plan     Row Name 02/06/20 1059       Plan    Plan  [facility]    Patient/Family in Agreement with Plan  yes    Plan Comments  Spoke w/ Michell at [facility], they are able to accept pending insurance approval.  Insurance precert has now been initiated and may take up to 24 hours to process. CM following.         Discharge Codes    No documentation.       Expected Discharge Date and Time     Expected Discharge Date Expected Discharge Time    Feb 5, 2020             Ashley Stinson RN

## 2020-02-07 VITALS
DIASTOLIC BLOOD PRESSURE: 78 MMHG | WEIGHT: 145.94 LBS | BODY MASS INDEX: 23.46 KG/M2 | TEMPERATURE: 98.2 F | SYSTOLIC BLOOD PRESSURE: 131 MMHG | OXYGEN SATURATION: 96 % | HEART RATE: 109 BPM | RESPIRATION RATE: 18 BRPM | HEIGHT: 66 IN

## 2020-02-07 PROBLEM — J96.01 ACUTE RESPIRATORY FAILURE WITH HYPOXIA AND HYPERCAPNIA: Status: RESOLVED | Noted: 2020-01-28 | Resolved: 2020-02-07

## 2020-02-07 PROBLEM — J96.02 ACUTE RESPIRATORY FAILURE WITH HYPOXIA AND HYPERCAPNIA: Status: RESOLVED | Noted: 2020-01-28 | Resolved: 2020-02-07

## 2020-02-07 PROBLEM — I16.1 HYPERTENSIVE EMERGENCY: Status: RESOLVED | Noted: 2020-01-28 | Resolved: 2020-02-07

## 2020-02-07 LAB
ANION GAP SERPL CALCULATED.3IONS-SCNC: 13 MMOL/L (ref 5–15)
BUN BLD-MCNC: 20 MG/DL (ref 6–20)
BUN/CREAT SERPL: 45.5 (ref 7–25)
CALCIUM SPEC-SCNC: 9.9 MG/DL (ref 8.6–10.5)
CHLORIDE SERPL-SCNC: 92 MMOL/L (ref 98–107)
CO2 SERPL-SCNC: 28 MMOL/L (ref 22–29)
CREAT BLD-MCNC: 0.44 MG/DL (ref 0.57–1)
GFR SERPL CREATININE-BSD FRML MDRD: >150 ML/MIN/1.73
GLUCOSE BLD-MCNC: 159 MG/DL (ref 65–99)
GLUCOSE BLDC GLUCOMTR-MCNC: 160 MG/DL (ref 70–130)
GLUCOSE BLDC GLUCOMTR-MCNC: 173 MG/DL (ref 70–130)
POTASSIUM BLD-SCNC: 4.6 MMOL/L (ref 3.5–5.2)
SODIUM BLD-SCNC: 133 MMOL/L (ref 136–145)

## 2020-02-07 PROCEDURE — 99239 HOSP IP/OBS DSCHRG MGMT >30: CPT | Performed by: HOSPITALIST

## 2020-02-07 PROCEDURE — 63710000001 INSULIN REGULAR HUMAN PER 5 UNITS: Performed by: HOSPITALIST

## 2020-02-07 PROCEDURE — 82962 GLUCOSE BLOOD TEST: CPT

## 2020-02-07 PROCEDURE — 80048 BASIC METABOLIC PNL TOTAL CA: CPT | Performed by: HOSPITALIST

## 2020-02-07 PROCEDURE — 25010000002 HEPARIN (PORCINE) PER 1000 UNITS: Performed by: INTERNAL MEDICINE

## 2020-02-07 PROCEDURE — 25010000003 CEFAZOLIN 1-4 GM/50ML-% SOLUTION: Performed by: SURGERY

## 2020-02-07 RX ORDER — LISINOPRIL 20 MG/1
40 TABLET ORAL DAILY
Qty: 30 TABLET | Refills: 0 | Status: SHIPPED | OUTPATIENT
Start: 2020-02-07 | End: 2020-03-09

## 2020-02-07 RX ORDER — IPRATROPIUM BROMIDE AND ALBUTEROL SULFATE 2.5; .5 MG/3ML; MG/3ML
3 SOLUTION RESPIRATORY (INHALATION) EVERY 6 HOURS PRN
Qty: 360 ML
Start: 2020-02-07 | End: 2020-03-09

## 2020-02-07 RX ORDER — PANTOPRAZOLE SODIUM 40 MG/10ML
40 INJECTION, POWDER, LYOPHILIZED, FOR SOLUTION INTRAVENOUS
Qty: 30 EACH | Refills: 0 | Status: SHIPPED | OUTPATIENT
Start: 2020-02-08 | End: 2020-03-09

## 2020-02-07 RX ORDER — ACETAMINOPHEN 325 MG/1
650 TABLET ORAL EVERY 6 HOURS PRN
Start: 2020-02-07

## 2020-02-07 RX ORDER — HEPARIN SODIUM 5000 [USP'U]/ML
5000 INJECTION, SOLUTION INTRAVENOUS; SUBCUTANEOUS EVERY 8 HOURS SCHEDULED
Start: 2020-02-07 | End: 2020-03-09

## 2020-02-07 RX ORDER — CARVEDILOL 25 MG/1
25 TABLET ORAL 2 TIMES DAILY WITH MEALS
Qty: 60 TABLET | Refills: 0 | Status: SHIPPED | OUTPATIENT
Start: 2020-02-07 | End: 2020-03-09 | Stop reason: DRUGHIGH

## 2020-02-07 RX ORDER — BISACODYL 10 MG
10 SUPPOSITORY, RECTAL RECTAL DAILY PRN
Start: 2020-02-07 | End: 2020-03-09

## 2020-02-07 RX ORDER — DEXTROSE MONOHYDRATE 25 G/50ML
25 INJECTION, SOLUTION INTRAVENOUS
Status: DISCONTINUED | OUTPATIENT
Start: 2020-02-07 | End: 2020-02-07 | Stop reason: HOSPADM

## 2020-02-07 RX ORDER — NICOTINE POLACRILEX 4 MG
15 LOZENGE BUCCAL
Status: DISCONTINUED | OUTPATIENT
Start: 2020-02-07 | End: 2020-02-07 | Stop reason: HOSPADM

## 2020-02-07 RX ORDER — BISACODYL 10 MG
10 SUPPOSITORY, RECTAL RECTAL DAILY PRN
Status: DISCONTINUED | OUTPATIENT
Start: 2020-02-07 | End: 2020-02-07 | Stop reason: HOSPADM

## 2020-02-07 RX ORDER — AMINO ACIDS/PROTEIN HYDROLYS 15G-100/30
30 LIQUID (ML) ORAL DAILY
Start: 2020-02-08 | End: 2020-03-09

## 2020-02-07 RX ADMIN — CARVEDILOL 25 MG: 12.5 TABLET, FILM COATED ORAL at 08:32

## 2020-02-07 RX ADMIN — CEFAZOLIN SODIUM 1 G: 1 INJECTION, SOLUTION INTRAVENOUS at 05:36

## 2020-02-07 RX ADMIN — CEFAZOLIN SODIUM 1 G: 1 INJECTION, SOLUTION INTRAVENOUS at 14:28

## 2020-02-07 RX ADMIN — DOCUSATE SODIUM 100 MG: 50 LIQUID ORAL at 08:32

## 2020-02-07 RX ADMIN — HEPARIN SODIUM 5000 UNITS: 5000 INJECTION INTRAVENOUS; SUBCUTANEOUS at 14:28

## 2020-02-07 RX ADMIN — INSULIN HUMAN 2 UNITS: 100 INJECTION, SOLUTION PARENTERAL at 12:05

## 2020-02-07 RX ADMIN — HEPARIN SODIUM 5000 UNITS: 5000 INJECTION INTRAVENOUS; SUBCUTANEOUS at 05:36

## 2020-02-07 RX ADMIN — LISINOPRIL 40 MG: 40 TABLET ORAL at 08:32

## 2020-02-07 RX ADMIN — PANTOPRAZOLE SODIUM 40 MG: 40 INJECTION, POWDER, FOR SOLUTION INTRAVENOUS at 08:32

## 2020-02-07 NOTE — DISCHARGE SUMMARY
Psychiatric Medicine Services  DISCHARGE SUMMARY    Patient Name: Yee Goodwin  : 1970  MRN: [id number]    Date of Admission: 2020  5:07 PM  Date of Discharge:  2020  Primary Care Physician: Ozzie Lucero MD    Consults     Date and Time Order Name Status Description    2020 1123 Inpatient General Surgery Consult Completed           Hospital Course     Presenting Problem:   Acute intracerebral hemorrhage (CMS/HCC) [I61.9]    Active Hospital Problems    Diagnosis  POA   • **Acute L frontal ICH 20 [I62.9]  Yes   • S/P L frontal craniotomy and evacuation of intraparenchymal hematoma 20 [Z98.890]  Not Applicable   • Elevated transaminase levels [R74.0]  Yes   • Dyslipidemia [E78.5]  Yes   • JAYESH (obstructive sleep apnea) [G47.33]  Yes   • Tobacco abuse [Z72.0]  Yes      Resolved Hospital Problems    Diagnosis Date Resolved POA   • Hypertensive emergency [I16.1] 2020 Yes   • Hypokalemia [E87.6] 2020 Yes   • Acute hypoxemic/hypercarbic respiratory failure due to ICH. Required Ventilatory support [J96.01, J96.02] 2020 Yes          Hospital Course:  Yee Goodwin is a 49 y.o. female with relevant PMH of HTN, HLD, JAYESH, Tobacco abuse, admitted to [facility] ICU 2020 with left frontal ICH and hypertensive emergency.  NIH was 25. Patient had significant hemorrhage with surrounding edema and associated subarachnoid hemorrhage creating mass-effect on the left lateral ventricle with rightward shift of 7 mm.  CTA revealed atherosclerosis of bilateral carotid bifurcations with 80% right and 75% left luminal narrowing as well as moderate to severe stenoses and irregularities throughout the MCA distribution bilaterally.  No vascular malformations / aneurysms however were noted.  Patient had acute hypoxic-hypercapneic resp failure secondary to ICH, required intubation for airway protection. She was seen by NS and underwent left frontal craniotomy and  evacuation of intraparenchymal hematoma on 1/30/20. She had significant dysphagia and is NPO at time of discharge. PEG placed 2/3/20 by Dr Young. Currently tolerating tube feeding without any problems. She does have hyponatremia, likely SIADH from ICH, appears to be improving at time of discharge. Her hospital course also complicated by urinary retention, failed voiding trial while in the ICU and Ryan was re-anchored. Will keep Ryan in place at time of discharge and [facility] to consider voiding trial PRN in 2-3 days. She also had low grade fever, but no obvious infectious etiology found, poss atelectasis, resolved over last 48 hrs w/o abx.    Of note, patient is on hep sc for DVT prophylaxis, cleared by NS.    Hospital problem list:     Acute L frontal ICH, likely due to uncontrolled HTN  -s/p craniotomy and evacuation of intraparenchymal hematoma on 1/30 by Dr Cole     Acute hypoxic-hypercapneic resp failure secondary to ICH  -s/p mechanical ventilation, successfully extubated and on RA now      Hypertensive emergency  -controlled with current meds     Hyponatremia  -likely SIAD due to ICH     Dysphagia  -s/p PEG tube by Dr Young, tolerating enteral feeding at this time     Urinary retention  -failed voiding trial in  The ICU, Ryan reanchored  -plan to send to STR with Ryan and SNF to do voiding trial     Fever  -no obvious infectious etiology, voiding trial per [facility] staff in few days  - had Cefazolin per Dr Young     Constipation  -resolved with bowel care regimen     JAYESH  CPAP at night     HLD  -Statin therapy     Tobacco abuse  -cessation advised      Discharge Follow Up Recommendations for outpatient labs/diagnostics:  CBC,BMP    Day of Discharge     HPI:   Unobtainable, nonverbal, alert and does not appear to be in any distress. No family present.    Review of Systems  Unobtainable, pt nonverbal to me.    Vital Signs:   Temp:  [97.4 °F (36.3 °C)-98.9 °F (37.2 °C)] 98.2 °F (36.8 °C)  Heart Rate:  []  109  Resp:  [14-18] 18  BP: (103-131)/(61-79) 131/78     Physical Exam:  General Assessment: No acute cardiopulmonary distress. Well developed and well nourished.      HEENT: Craniotomy incision intact,  PERRL, MM moist     Neck: Supple, no carotid bruit bilat     CVS: RRR, S1S2 normal, no murmurs     Resp: CTAB, no adventitious sound     Abd: soft, NT, ND, normal BS, no guarding or peritoneal signs     Ext: No edema, both calves are symmetric and NTTP     Neuro: Limited, alert, nonverbal, not following any commands, moves both LUE/LLE, but not the right side. Mild facial asymmetry noted.     Skin: W/D/I. No rash.     Psych: Affect is flat       Pertinent  and/or Most Recent Results     Results from last 7 days   Lab Units 02/07/20  1106 02/06/20  0357 02/05/20  0559 02/04/20  0553 02/03/20  1642 02/03/20  0418 02/02/20  0522 02/01/20  0354   WBC 10*3/mm3  --  14.48* 14.30* 13.25*  --  16.90* 17.75* 17.15*   HEMOGLOBIN g/dL  --  11.8* 11.7* 12.1  --  12.4 13.0 12.3   HEMATOCRIT %  --  36.1 35.4 35.6  --  35.9 38.9 38.5   PLATELETS 10*3/mm3  --  468* 412 368  --  346 391 372   SODIUM mmol/L 133* 129* 132* 131* 128* 125* 127* 131*   POTASSIUM mmol/L 4.6 4.2 4.1 3.7  --  3.8 3.8 3.4*   CHLORIDE mmol/L 92* 90* 92* 91*  --  86* 88* 93*   CO2 mmol/L 28.0 24.0 27.0 27.0  --  22.0 25.0 24.0   BUN mg/dL 20 17 14 15  --  12 13 10   CREATININE mg/dL 0.44* 0.38* 0.35* 0.37*  --  0.32* 0.32* 0.35*   GLUCOSE mg/dL 159* 151* 157* 157*  --  111* 143* 113*   CALCIUM mg/dL 9.9 9.2 9.6 8.9  --  9.1 9.4 9.2     Results from last 7 days   Lab Units 02/06/20  0357 02/05/20  0559 02/04/20  0553 02/03/20  0418 02/01/20  0354   BILIRUBIN mg/dL 0.3 0.3 0.4 0.6 0.6   ALK PHOS U/L 132* 133* 128* 117 120*   ALT (SGPT) U/L 58* 64* 77* 94* 174*   AST (SGOT) U/L 37* 26 30 34* 74*   PROTIME Seconds  --   --   --  12.7  --    INR   --   --   --  1.00  --    APTT seconds  --   --   --  25.0  --      Results from last 7 days   Lab Units 02/03/20  0418    CHOLESTEROL mg/dL 216*   TRIGLYCERIDES mg/dL 108           Brief Urine Lab Results  (Last result in the past 365 days)      Color   Clarity   Blood   Leuk Est   Nitrite   Protein   CREAT   Urine HCG        02/04/20 1205 Yellow Clear Negative Negative Negative Negative               Microbiology Results Abnormal     Procedure Component Value - Date/Time    Blood Culture - Blood, Hand, Right [[id number]] Collected:  02/04/20 0955    Lab Status:  Preliminary result Specimen:  Blood from Hand, Right Updated:  02/07/20 1015     Blood Culture No growth at 3 days    Blood Culture - Blood, Hand, Left [[id number]] Collected:  02/04/20 0955    Lab Status:  Preliminary result Specimen:  Blood from Hand, Left Updated:  02/07/20 1015     Blood Culture No growth at 3 days    Respiratory Culture - Aspirate, ET Suction [[id number]] Collected:  01/29/20 0534    Lab Status:  Final result Specimen:  Aspirate from ET Suction Updated:  01/31/20 1128     Respiratory Culture Scant growth (1+) Normal Respiratory Mague     Gram Stain Few (2+) WBCs seen      No organisms seen      Few (2+) Epithelial cells per low power field          Imaging Results (All)     Procedure Component Value Units Date/Time    XR Abdomen KUB [[id number]] Collected:  02/02/20 1142     Updated:  02/03/20 1106    Narrative:       EXAMINATION: XR ABDOMEN KUB-      INDICATION: Tube placement; I61.9-Nontraumatic intracerebral hemorrhage,  unspecified; I62.9-Nontraumatic intracranial hemorrhage, unspecified;  R13.10-Dysphagia, unspecified; R47.01-Aphasia; R48.2-Apraxia;  Z74.09-Other reduced mobility.      COMPARISON: Abdominal radiograph 02/01/2020.     FINDINGS: Single abdominal radiograph was submitted for review. No large  free intraperitoneal air appreciated. Gastric decompression tube is in  similar position when compared to yesterday's study. Nonspecific bowel  gas pattern is identified. Postsurgical changes in the right upper  quadrant pelvis are identified. No  acute osseous abnormality is  identified.           Impression:       Similar placement of a gastric decompression tube when  compared to 02/01/2020.     D:  02/02/2020  E:  02/03/2020     This report was finalized on 2/3/2020 11:02 AM by Dr. Stanley Hutchinson MD.       XR Chest 1 View [[id number]] Collected:  02/02/20 0744     Updated:  02/03/20 1015    Narrative:          EXAMINATION: XR CHEST 1 VW - 02/02/2020     INDICATION: I61.9-Nontraumatic intracerebral hemorrhage, unspecified;  I62.9-Nontraumatic intracranial hemorrhage, unspecified;  R13.10-Dysphagia, unspecified; R47.01-Aphasia; R48.2-Apraxia;  Z74.09-Other reduced mobility.      COMPARISON: Chest radiograph 01/31/2020     FINDINGS: Single portable chest radiograph is submitted for review. The  heart is not enlarged. Chronic-appearing changes in the lungs identified  with diffuse interstitial edema. No pleural effusion or pneumothorax.  Right midlung scarring noted, similar to prior. No pneumothorax or  sizable pleural effusion appreciated. Visualized upper abdomen is  unrevealing. No acute osseous abnormalities. What is felt to be the  endotracheal tube is approximately 5.2 cm into the napoleon. Gastric  decompression tube courses beyond the inferior border of the radiograph  in the expected region of the stomach.           Impression:       Similar exam when compared to 01/31/2020.     DICTATED:   02/02/2020  EDITED/ls :   02/02/2020      This report was finalized on 2/3/2020 10:12 AM by Dr. Stanley Hutchinson MD.       CT Head Without Contrast [[id number]] Collected:  01/30/20 1046     Updated:  02/02/20 2107    Narrative:       EXAMINATION: CT HEAD WO CONTRAST- 01/30/2020     INDICATION: I61.9-Nontraumatic intracerebral hemorrhage, unspecified;  I62.9-Nontraumatic intracranial hemorrhage, unspecified;  I62.9-Nontraumatic intracranial hemorrhage, unspecified     TECHNIQUE: 5 mm unenhanced images through the brain     The radiation dose reduction device was  turned on for each scan per the  ALARA (As Low as Reasonably Achievable) protocol.     COMPARISON: 01/29/2020     FINDINGS: Allowing for slightly different slice selection, the patient's  left frontal parenchymal hemorrhage, measuring along its longest  diameters, is stable at approximately 68 x 28 mm. Image by image  comparison shows stable appearing margins of the hemorrhage.  Intraventricular component appears stable to slightly increased, with  intraventricular hemorrhage mostly seen within the posterior two thirds  of the left lateral ventricle. There is no evidence of developing  hydrocephalus. Edema associated with parenchymal hemorrhage appears  stable.      There is minimal if any subdural component on the left at the skull  vertex. There appears to be trace subarachnoid or petechial hemorrhage  in the high right parietal region, decreased from prior study.     There is no evidence of new hemorrhage/infarct/edema elsewhere. Midline  shift is proximally stable, measured as 6 mm on yesterday's exam and 7  mm today.       Impression:       1. Stable size and extent of patient's left frontal parenchymal  hemorrhage compared to yesterday's 5:04 AM exam. Stable surrounding  edema.     2. Stable to slightly increased intraventricular hemorrhage. No evidence  of hydrocephalus.     3. Trace right parietal subarachnoid or petechial hemorrhage, decreased  from yesterday's study.     D:  01/30/2020  E:  01/30/2020        This report was finalized on 2/2/2020 9:04 PM by Dr. Zoran Tinoco MD.       XR Abdomen KUB [[id number]] Collected:  02/01/20 1229     Updated:  02/02/20 1050    Narrative:          EXAMINATION: XR ABDOMEN KUB - 02/01/2020     INDICATION: I61.9-Nontraumatic intracerebral hemorrhage, unspecified;  I62.9-Nontraumatic intracranial hemorrhage, unspecified;  R13.10-Dysphagia, unspecified; R47.01-Aphasia; R48.2-Apraxia;  Z74.09-Other reduced mobility.      COMPARISON: Abdominal radiograph 02/01/2020        Impression:       FINDINGS/IMPRESSION: There has been interval removal of a weighted small  bowel feeding tube with interval placement of a gastric decompression  tube crossing the midline within the expected region of the  stomach/proximal duodenum. Nonspecific bowel gas pattern is identified.  No large free intraperitoneal air noted. Post cholecystectomy changes  are identified. Postsurgical changes in the lower pelvis noted.  Thoracolumbar degenerative changes identified.         DICTATED:   02/01/2020  EDITED/ls :   02/01/2020      This report was finalized on 2/2/2020 10:47 AM by Dr. Stanley Hutchinson MD.       XR Abdomen KUB [[id number]] Collected:  01/31/20 1757     Updated:  02/01/20 0924    Narrative:          EXAMINATION: XR ABDOMEN KUB - 01/31/2020     INDICATION: I61.9-Nontraumatic intracerebral hemorrhage, unspecified;  I62.9-Nontraumatic intracranial hemorrhage, unspecified;  I62.9-Nontraumatic intracranial hemorrhage, unspecified;  R13.10-Dysphagia, unspecified; R47.01-Aphasia; R48.2-Apraxia.      COMPARISON: Abdominal radiograph 01/30/2020     FINDINGS: Single abdominal radiograph is submitted for review. Small  bowel feeding tube is identified coursing through the expected region of  the stomach extending into the left midabdomen which may be within an  enlarged  stomach. Nonspecific bowel gas pattern identified. Scattered  postsurgical changes are noted. Thoracolumbar degenerative changes are  identified. No large free intraperitoneal air appreciated.       Impression:       Feeding tube identified coursing in the expected region of  the stomach with its distal tip projecting into the left mid  abdomen/left lower quadrant which may be within a distended stomach.     DICTATED:   01/31/2020  EDITED/ls :   01/31/2020      This report was finalized on 2/1/2020 9:21 AM by Dr. Stanley Hutchinson MD.       XR Abdomen KUB [[id number]] Collected:  02/01/20 0523     Updated:  02/01/20 0525    Narrative:        ABDOMEN, 2/1/2020 (05:07)      HISTORY:    Feeding tube placement.      TECHNIQUE:  AP portable supine radiograph of the upper abdomen.      FINDINGS:  Feeding tube tip in the mid stomach, partially coiled back upon itself.      Signer Name: Quentin Sosa MD   Signed: 2/1/2020 5:23 AM   Workstation [facility]: [facility]    [facility]    XR Abdomen KUB [[id number]] Collected:  02/01/20 0132     Updated:  02/01/20 0134    Narrative:       ABDOMEN, 2/1/2020      HISTORY:    Feeding tube placement.      TECHNIQUE:  AP portable supine radiograph of the upper abdomen.      FINDINGS:  Feeding tube is coiled within the mid and distal stomach.      Signer Name: Quentin Sosa MD   Signed: 2/1/2020 1:32 AM   Workstation [facility]: [facility]    [facility]    XR Chest 1 View [[id number]] Collected:  01/31/20 0817     Updated:  01/31/20 0937    Narrative:       EXAMINATION: XR CHEST 1 VW-      INDICATION: Intubated Patient; I61.9-Nontraumatic intracerebral  hemorrhage, unspecified; I62.9-Nontraumatic intracranial hemorrhage,  unspecified.     COMPARISON: 01/30/2020.     FINDINGS: Support hardware demonstrates interval placement of  esophagogastric tube courses below the diaphragm and out of the  field-of-view within appropriate positioning. No pneumothorax or pleural  effusion. Interstitial reticulation of lung markings similar to prior  without interval change.           Impression:       Esophagogastric tube is been placed coursing below the  diaphragm and out of the field-of-view. No significant change in  pulmonary findings otherwise.     D:  01/31/2020  E:  01/31/2020     This report was finalized on 1/31/2020 9:34 AM by Dr. Kraig Rojas.       CT Head Without Contrast [[id number]] Collected:  01/30/20 1507     Updated:  01/30/20 0569    Narrative:       EXAMINATION: CT HEAD WO CONTRAST - 01/30/2020     INDICATION:  I61.9-Nontraumatic intracerebral hemorrhage,  unspecified;  I62.9-Nontraumatic intracranial hemorrhage, unspecified;  I62.9-Nontraumatic intracranial hemorrhage, unspecified. Head surgery.     TECHNIQUE: Multiple axial CT imaging is obtained of the head from skull  base to skull vertex without the administration of intravenous contrast.     The radiation dose reduction device was turned on for each scan per the  ALARA (As Low as Reasonably Achievable) protocol.     COMPARISON: 01/30/2020     FINDINGS: Patient has undergone evacuation of the previously noted  parenchymal hemorrhage within the left frontal parietal lobe. There is a  small amount of subarachnoid hemorrhage seen at the right vertex which  is stable and unchanged. The amount of hemorrhage has decreased in size  in the interval. There is improvement seen in the surrounding edema and  mass effect. Minimal pneumocephalus identified with some  interventricular hemorrhage now seen on the left. There is no abnormal  extra-axial fluid collection identified. The bony structures reveal no  evidence of osseous abnormality with again postcraniotomy changes seen  in the left frontal parietal region.       Impression:       Interval evacuation of the previously noted large hemorrhage  in the left frontal lobe near the vertex. Significant improvement seen  in the surrounding edema and mass effect with minimal pneumocephalus  identified. Small amount of intraventricular hemorrhage is identified  with stable subarachnoid hemorrhage at the right vertex.     DICTATED:   01/30/2020  EDITED/ls :   01/30/2020      This report was finalized on 1/30/2020 5:39 PM by Dr. Fabienne Pereira MD.       XR Abdomen KUB [[id number]] Collected:  01/30/20 1233     Updated:  01/30/20 1741    Narrative:       EXAMINATION: XR ABDOMEN KUB-      INDICATION: NG placement; I61.9-Nontraumatic intracerebral hemorrhage,  unspecified; I62.9-Nontraumatic intracranial hemorrhage, unspecified.     COMPARISON: None.     FINDINGS: KUB reveals  nasogastric tube with tip in the stomach. Surgical  clips are seen in the right upper quadrant from prior cholecystectomy.  Bowel gas pattern is unremarkable. No evidence of obvious obstruction.  Degenerative change is seen within the spine.           Impression:       Nasogastric tube not well seen, however, tip appears to be  within the stomach. Bowel gas pattern is nonspecific. Surgical clips are  seen from prior cholecystectomy.     D:  01/30/2020  E:  01/30/2020     This report was finalized on 1/30/2020 5:38 PM by Dr. Fabienne Pereira MD.       XR Chest 1 View [[id number]] Collected:  01/30/20 1006     Updated:  01/30/20 1700    Narrative:       EXAMINATION: XR CHEST 1 VW- 01/30/2020     INDICATION: Intubated Patient; I61.9-Nontraumatic intracerebral  hemorrhage, unspecified; I62.9-Nontraumatic intracranial hemorrhage,  unspecified; I62.9-Nontraumatic intracranial hemorrhage, unspecified      COMPARISON: 01/29/2020     FINDINGS: Portable chest reveals endotracheal tube in satisfactory  position above the napoleon. Cardiac and mediastinal silhouettes within  normal limits. Mild increased markings seen in the lung bases with small  bilateral pleural effusions. Increased pulmonary vascularity bilaterally  with degenerative changes seen within the spine.           Impression:       Stable chest as above.     D:  01/30/2020  E:  01/30/2020     This report was finalized on 1/30/2020 4:57 PM by Dr. Fabienne Pereira MD.       XR Chest 1 View [[id number]] Collected:  01/29/20 1037     Updated:  01/29/20 1720    Narrative:       EXAMINATION: XR CHEST 1 VW- 01/29/2020     INDICATION: Intubated Patient; I61.9-Nontraumatic intracerebral  hemorrhage, unspecified; I62.9-Nontraumatic intracranial hemorrhage,  unspecified      COMPARISON: 01/28/2020     FINDINGS: Portable chest reveals cardiac and mediastinal silhouettes  within normal limits. Mild increased markings seen diffusely throughout  the lung fields with small  bilateral pleural effusions. Endotracheal  tube is in satisfactory position above the napoleon. Small bilateral  pleural effusions. Degenerative changes seen within the spine           Impression:       Increased pulmonary vascularity seen diffusely throughout  the lung fields bilaterally with small bilateral pleural effusions.  Degenerative changes seen within the spine.     D:  01/29/2020  E:  01/29/2020     This report was finalized on 1/29/2020 5:17 PM by Dr. Fabienne Pereira MD.       CT Head Without Contrast [[id number]] Collected:  01/29/20 0813     Updated:  01/29/20 1006    Narrative:       EXAMINATION: CT HEAD WO CONTRAST-01/29/2020:      INDICATION: Stroke; I61.9-Nontraumatic intracerebral hemorrhage,  unspecified; I62.9-Nontraumatic intracranial hemorrhage, unspecified.      TECHNIQUE: CT head without intravenous contrast.     The radiation dose reduction device was turned on for each scan per the  ALARA (As Low as Reasonably Achievable) protocol.     COMPARISON: CT head one-day prior 01/28/2020 at 1706 hours.     FINDINGS: Similar appearance of left frontal lobe centered  intraparenchymal hemorrhage with surrounding vasogenic edema with  adjacent subarachnoid hemorrhage in the left frontal lobe demonstrating  intraventricular hemorrhage extension now present with effacement of the  left lateral ventricle and rightward midline shift of approximately 7  mm, similar to prior. No hydrocephalus evident. Globes and orbits are  unremarkable. The visualized paranasal sinuses and mastoid air cells are  grossly clear and well pneumatized.       Impression:       Interval development of intraventricular hemorrhage  extension from prior comparison with otherwise stable appearance of a  left frontal lobe centered intraparenchymal hemorrhage, effacement left  lateral ventricle and rightward midline shift. No hydrocephalus.     D:  01/29/2020  E:  01/29/2020           This report was finalized on 1/29/2020 10:03 AM by  Dr. Kraig Rojas.       CT Angiogram Neck [[id number]] Collected:  01/28/20 1750     Updated:  01/29/20 1006    Narrative:       EXAMINATION: CT ANGIOGRAM NECK-, CT ANGIOGRAM HEAD-01/28/2020:      INDICATION: Stroke.      TECHNIQUE: CT angiogram head and neck with intravenous contrast. 2-D and  3-D reconstructions performed.     The radiation dose reduction device was turned on for each scan per the  ALARA (As Low as Reasonably Achievable) protocol.     COMPARISON: CT head, stroke protocol, immediately prior.     FINDINGS:      CTA NECK:  Patent great vessel origins with noted anatomic variance of  left vertebral arising directly from the aortic arch. The vertebral  arteries demonstrate mild left-sided dominance of caliber within normal  variance without focal severe stenosis, aneurysm or occlusion. The  carotids demonstrate grossly normal course and branching with  atherosclerotic involvement of the carotid bifurcations including  calcified plaque formation producing 80% right and 75% left luminal  narrowing as measured by NASCET criteria. Distal internal carotid  arteries are patent through the intracranial segments discussed further  below on the dedicated CTA head portion. Cervical soft tissues are  grossly unremarkable for acute findings or bulky adenopathy with the  thyroid homogeneous. The lung apices demonstrate biapical  pleural-parenchymal scarring and emphysematous involvement as well as  microbullous formation.     CTA HEAD: Minimally atherosclerotic involvement of the distal internal  carotid arteries with calcific disease creating mild luminal stenosis  without focal severe stenosis, aneurysm or occlusion. Anterior cerebral  arteries are patent without hemodynamically significant stenosis,  aneurysm or occlusion. Middle cerebral arteries demonstrate areas of  moderate-to-severe stenoses and irregularities in the M2 and M3 segments  for example, inferior branch right M2, however, no distinct  occlusion  despite these irregularities of atherosclerotic involvement without  calcific involvement of the MCA territories. Vertebrobasilar system and  posterior cerebral arteries are patent without hemodynamically  significant stenosis, aneurysm or occlusion. Partially visualized venous  structures unremarkable with superior sagittal sinus widely patent.   Redemonstration of a left frontal lobe centered intraparenchymal  hemorrhage seen best on prior CTA noncontrast examination.       Impression:       1.  Atherosclerotic involvement of the carotid bifurcations including  calcified plaque formation producing 80% right and 75% left luminal  narrowing as measured by NASCET criteria.  2.  Areas of moderate-to-severe stenoses and irregularities throughout  the MCA distributions bilaterally involving primarily the M2 and M3  segments without distinct occlusion evident or abnormal vascular  malformation in the left frontal region where there is a known left  intraparenchymal hemorrhage.     D:  01/28/2020  E:  01/29/2020           This report was finalized on 1/29/2020 10:03 AM by Dr. Kraig Rojas.       CT Angiogram Head [[id number]] Collected:  01/28/20 1750     Updated:  01/29/20 1006    Narrative:       EXAMINATION: CT ANGIOGRAM NECK-, CT ANGIOGRAM HEAD-01/28/2020:      INDICATION: Stroke.      TECHNIQUE: CT angiogram head and neck with intravenous contrast. 2-D and  3-D reconstructions performed.     The radiation dose reduction device was turned on for each scan per the  ALARA (As Low as Reasonably Achievable) protocol.     COMPARISON: CT head, stroke protocol, immediately prior.     FINDINGS:      CTA NECK:  Patent great vessel origins with noted anatomic variance of  left vertebral arising directly from the aortic arch. The vertebral  arteries demonstrate mild left-sided dominance of caliber within normal  variance without focal severe stenosis, aneurysm or occlusion. The  carotids demonstrate grossly normal  course and branching with  atherosclerotic involvement of the carotid bifurcations including  calcified plaque formation producing 80% right and 75% left luminal  narrowing as measured by NASCET criteria. Distal internal carotid  arteries are patent through the intracranial segments discussed further  below on the dedicated CTA head portion. Cervical soft tissues are  grossly unremarkable for acute findings or bulky adenopathy with the  thyroid homogeneous. The lung apices demonstrate biapical  pleural-parenchymal scarring and emphysematous involvement as well as  microbullous formation.     CTA HEAD: Minimally atherosclerotic involvement of the distal internal  carotid arteries with calcific disease creating mild luminal stenosis  without focal severe stenosis, aneurysm or occlusion. Anterior cerebral  arteries are patent without hemodynamically significant stenosis,  aneurysm or occlusion. Middle cerebral arteries demonstrate areas of  moderate-to-severe stenoses and irregularities in the M2 and M3 segments  for example, inferior branch right M2, however, no distinct occlusion  despite these irregularities of atherosclerotic involvement without  calcific involvement of the MCA territories. Vertebrobasilar system and  posterior cerebral arteries are patent without hemodynamically  significant stenosis, aneurysm or occlusion. Partially visualized venous  structures unremarkable with superior sagittal sinus widely patent.   Redemonstration of a left frontal lobe centered intraparenchymal  hemorrhage seen best on prior CTA noncontrast examination.       Impression:       1.  Atherosclerotic involvement of the carotid bifurcations including  calcified plaque formation producing 80% right and 75% left luminal  narrowing as measured by NASCET criteria.  2.  Areas of moderate-to-severe stenoses and irregularities throughout  the MCA distributions bilaterally involving primarily the M2 and M3  segments without distinct  occlusion evident or abnormal vascular  malformation in the left frontal region where there is a known left  intraparenchymal hemorrhage.     D:  01/28/2020  E:  01/29/2020           This report was finalized on 1/29/2020 10:03 AM by Dr. Kraig Rojas.       CT Head Without Contrast Stroke Protocol [[id number]] Collected:  01/28/20 1714     Updated:  01/29/20 0935    Narrative:       EXAMINATION: CT HEAD WO CONTRAST, STROKE PROTOCOL-01/28/2020:      INDICATION: Stroke.      TECHNIQUE: CT head without intravenous contrast.     The radiation dose reduction device was turned on for each scan per the  ALARA (As Low as Reasonably Achievable) protocol.     COMPARISON: NONE.     FINDINGS: Intraparenchymal hemorrhage centered within the left frontal  lobe with adjacent vasogenic edema and subarachnoid hemorrhage  demonstrating effacement of the left lateral ventricle as well as 7 mm  rightward midline shift without intraventricular hemorrhage or  hydrocephalus development. Globes and orbits unremarkable. The  visualized paranasal sinuses and mastoid air cells are grossly clear and  well pneumatized. Calvarium intact.     ICH Volume is >30 ml: 43.7 mL.      Intraventricular Hemorrhage: No.     Infratentorial Origin of Hemorrhage: No.       Impression:       Intraparenchymal hemorrhage with approximate volume 43 mL  centered within the left frontal lobe of intra-axial involvement with  surrounding edema and subarachnoid hemorrhage creating mass effect on  the left lateral ventricle along with rightward midline shift of  approximately 7 mm. No evidence for intraventricular hemorrhage  extension.     Scan performed on 01/28/2020 at 1707 hours. Scan report given to ER  physician and team in person at scanner by Dr. Rojas on 01/28/2020 at 1712  hours.     D:  01/28/2020  E:  01/29/2020           This report was finalized on 1/29/2020 9:32 AM by Dr. Kraig Rojas.       XR Chest 1 View [[id number]] Collected:  01/28/20 2006     Updated:   01/28/20 2008    Narrative:       CR Chest 1 Vw    INDICATION:   Thyroid endotracheal tube repositioning     COMPARISON:    Earlier the same day    FINDINGS:  Single portable AP view(s) of the chest.    Endotracheal tube is been pulled back so that its tip is 6 mm above the napoleon. Recommend pulling it back to more centimeters. Heart size normal. There is mild interstitial prominence. Lungs otherwise clear.          Impression:       The endotracheal tube tip is now just above the napoleon. Recommend pulling back to more centimeters.    No change in mild interstitial prominence    Signer Name: Toan Badillo MD   Signed: 1/28/2020 8:06 PM   Workstation Name: [id number]    [facility]    XR Chest 1 View [[id number]] Collected:  01/28/20 1817     Updated:  01/28/20 1823    Narrative:       EXAMINATION: XR CHEST 1 VW-     INDICATION: ET tube     TECHNIQUE:     COMPARISON: NONE     FINDINGS: ET tube is seen approximately 3 cm below the inferior margin  of clavicles and appears to intubate the right mainstem bronchus,  however, there is no resulting left lung atelectasis. Heart is normal in  size. There is diffuse, mild to moderate pulmonary interstitial disease,  nonspecific in appearance, questionable for interstitial edema. No lung  consolidation effusion or pneumothorax is seen. There is slightly  greater patchy disease in the medial left lung base, than seen  elsewhere.          Impression:       1. ET tube appears to lie 1 to 2 cm into the right mainstem bronchus.  2. Diffuse pulmonary interstitial disease, nonspecific, questionable for  interstitial edema.         This report was finalized on 1/28/2020 6:20 PM by Dr. Zoran Tinoco MD.                       Results for orders placed during the hospital encounter of 01/28/20   Adult Transthoracic Echo Complete W/ Cont if Necessary Per Protocol (With Agitated Saline)    Narrative · Left ventricular systolic function is hyperdynamic (EF > 70%).  ·  Normal left atrial size and volume noted. No evidence of a patent   foramen ovale. Saline test results are negative  · No significant valve disease identified.          Plan for Follow-up of Pending Labs/Results:    Order Current Status    Blood Culture - Blood, Hand, Left Preliminary result    Blood Culture - Blood, Hand, Right Preliminary result        Discharge Details        Discharge Medications      New Medications      Instructions Start Date   acetaminophen 325 MG tablet  Commonly known as:  TYLENOL   650 mg, Oral, Every 6 Hours PRN      bisacodyl 10 MG suppository  Commonly known as:  DULCOLAX   10 mg, Rectal, Daily PRN      carvedilol 25 MG tablet  Commonly known as:  COREG   25 mg, Oral, 2 Times Daily With Meals      heparin (porcine) 5000 UNIT/ML injection   5,000 Units, Subcutaneous, Every 8 Hours Scheduled      insulin regular 100 UNIT/ML injection  Commonly known as:  humuLIN R,novoLIN R   0-7 Units, Subcutaneous, Every 6 Hours Scheduled      ipratropium-albuterol 0.5-2.5 mg/3 ml nebulizer  Commonly known as:  DUO-NEB   3 mL, Nebulization, Every 6 Hours PRN      pantoprazole 40 MG injection  Commonly known as:  PROTONIX   40 mg, Intravenous, Every 24 Hours Scheduled   Start Date:  February 8, 2020     PRO-STAT liquid oral liquid   30 mL, Per G Tube, Daily   Start Date:  February 8, 2020     sennosides 8.8 MG/5ML syrup  Commonly known as:  SENOKOT   10 mL, Oral, 2 Times Daily PRN         Changes to Medications      Instructions Start Date   lisinopril 20 MG tablet  Commonly known as:  PRINIVIL,ZESTRIL  What changed:  how much to take   40 mg, Oral, Daily         Stop These Medications    cyclobenzaprine 10 MG tablet  Commonly known as:  FLEXERIL     hydroCHLOROthiazide 12.5 MG capsule  Commonly known as:  MICROZIDE     lidocaine 5 %  Commonly known as:  LIDODERM     omeprazole 20 MG capsule  Commonly known as:  priLOSEC     TESSALON PERLES 100 MG capsule  Generic drug:  benzonatate     varenicline 0.5  MG X 11 & 1 MG X 42 tablet  Commonly known as:  CHANTIX STARTING MONTH PAWAN     varenicline 1 MG tablet  Commonly known as:  CHANTIX CONTINUING MONTH PAWAN            Allergies   Allergen Reactions   • Venlafaxine Rash         Discharge Disposition:  Rehab Facility or Unit (DC - External)    Diet:  Hospital:  Diet Order   Procedures   • NPO Diet       Activity:      Restrictions or Other Recommendations:         CODE STATUS:    Code Status and Medical Interventions:   Ordered at: 01/28/20 1838     Level Of Support Discussed With:    Patient     Code Status:    CPR     Medical Interventions (Level of Support Prior to Arrest):    Full           Additional Instructions for the Follow-ups that You Need to Schedule     Discharge Follow-up with PCP   As directed       Currently Documented PCP:    Ozzie Lucero MD    PCP Phone Number:    [phone]     Follow Up Details:  Please call for appnt         Discharge Follow-up with Specialty: Dr Black   As directed      Specialty:  Dr Black    Follow Up Details:  Please call for appnt         Discharge Follow-up with Specialty: Neurosurgery   As directed      Specialty:  Neurosurgery    Follow Up Details:  Please call for appnt         Discharge Follow-up with Specialty: Pulmonology   As directed      Specialty:  Pulmonology    Follow Up Details:  Please call for appnt         Discharge Follow-up with Specialty: Surgery-Dr Young   As directed      Specialty:  Surgery-Dr Young    Follow Up Details:  Please call for appnt               Time Spent on Discharge: 40 minutes    Electronically signed by Catie Quesada MD, 02/07/20, 2:17 PM.

## 2020-02-07 NOTE — PROGRESS NOTES
[facility] Medicine Services  PROGRESS NOTE    Patient Name: Yee Goodwin  : 1970  MRN: [id number]    Date of Admission: 2020  Primary Care Physician: Ozzie Lucero MD    Subjective   Subjective     CC:  F/u ICH/hypertensive emergency and acute resp failure    HPI:  Unobtainable, pt nonverbal, no family present, transferred out of ICU yesterday.    Review of Systems  Unobtainable, pt was nonverbal to me.     Objective   Objective     Vital Signs:   Temp:  [97.4 °F (36.3 °C)-99.4 °F (37.4 °C)] 97.4 °F (36.3 °C)  Heart Rate:  [74-94] 90  Resp:  [16-18] 16  BP: ()/(49-74) 115/61  Total (NIH Stroke Scale): 24     Physical Exam:  General Assessment: No acute cardiopulmonary distress. Well developed and well nourished.     HEENT: NCAT, PERRL, MM moist    Neck: Supple, no carotid bruit bilat    CVS: RRR, S1S2 normal, no murmurs    Resp: CTAB, no adventitious sound    Abd: soft, NT, ND, normal BS, no guarding or peritoneal signs    Ext: No edema, both calves are symmetric and NTTP    Neuro: Limited, pt was not cooperative, nonverbal and did not follow any commands for me. Per last intensivist's progress note, MIKE was still 21.     Skin: W/D/I. No rash.    Psych: Affect is flat    Results Reviewed:  Results from last 7 days   Lab Units 20  0559 20  0553 20  0418   WBC 10*3/mm3 14.48* 14.30* 13.25* 16.90*   HEMOGLOBIN g/dL 11.8* 11.7* 12.1 12.4   HEMATOCRIT % 36.1 35.4 35.6 35.9   PLATELETS 10*3/mm3 468* 412 368 346   INR   --   --   --  1.00     Results from last 7 days   Lab Units 20  03520  0559 20  0553   SODIUM mmol/L 129* 132* 131*   POTASSIUM mmol/L 4.2 4.1 3.7   CHLORIDE mmol/L 90* 92* 91*   CO2 mmol/L 24.0 27.0 27.0   BUN mg/dL 17 14 15   CREATININE mg/dL 0.38* 0.35* 0.37*   GLUCOSE mg/dL 151* 157* 157*   CALCIUM mg/dL 9.2 9.6 8.9   ALT (SGPT) U/L 58* 64* 77*   AST (SGOT) U/L 37* 26 30     Estimated Creatinine  Clearance: 183 mL/min (A) (by C-G formula based on SCr of 0.38 mg/dL (L)).    Microbiology Results Abnormal     Procedure Component Value - Date/Time    Blood Culture - Blood, Hand, Right [[id number]] Collected:  02/04/20 0955    Lab Status:  Preliminary result Specimen:  Blood from Hand, Right Updated:  02/06/20 1015     Blood Culture No growth at 2 days    Blood Culture - Blood, Hand, Left [[id number]] Collected:  02/04/20 0955    Lab Status:  Preliminary result Specimen:  Blood from Hand, Left Updated:  02/06/20 1015     Blood Culture No growth at 2 days    Respiratory Culture - Aspirate, ET Suction [[id number]] Collected:  01/29/20 0534    Lab Status:  Final result Specimen:  Aspirate from ET Suction Updated:  01/31/20 1128     Respiratory Culture Scant growth (1+) Normal Respiratory Mague     Gram Stain Few (2+) WBCs seen      No organisms seen      Few (2+) Epithelial cells per low power field          Imaging Results (Last 24 Hours)     ** No results found for the last 24 hours. **          Results for orders placed during the hospital encounter of 01/28/20   Adult Transthoracic Echo Complete W/ Cont if Necessary Per Protocol (With Agitated Saline)    Narrative · Left ventricular systolic function is hyperdynamic (EF > 70%).  · Normal left atrial size and volume noted. No evidence of a patent   foramen ovale. Saline test results are negative  · No significant valve disease identified.          I have reviewed the medications:  Scheduled Meds:  carvedilol 25 mg Oral BID With Meals   ceFAZolin 1 g Intravenous Q8H   docusate sodium 100 mg Oral BID   heparin (porcine) 5,000 Units Subcutaneous Q8H   lisinopril 40 mg Oral Daily   pantoprazole 40 mg Intravenous Q24H   PRO-STAT 30 mL Per G Tube Daily   sodium chloride 10 mL Intravenous Q12H     Continuous Infusions:   PRN Meds:.•  acetaminophen  •  ipratropium-albuterol  •  labetalol  •  magnesium sulfate **OR** magnesium sulfate **OR** magnesium sulfate  •  potassium &  "sodium phosphates **OR** potassium & sodium phosphates  •  [DISCONTINUED] potassium chloride **OR** potassium chloride **OR** potassium chloride  •  potassium phosphate infusion greater than 15 mMoles **OR** potassium phosphate infusion greater than 15 mMoles **OR** potassium phosphate **OR** sodium phosphate IVPB **OR** sodium phosphate IVPB **OR** sodium phosphate IVPB  •  sennosides  •  sodium chloride    Assessment/Plan   Assessment & Plan     Active Hospital Problems    Diagnosis  POA   • **Acute L frontal ICH 1/28/20 [I62.9]  Yes   • S/P L frontal craniotomy and evacuation of intraparenchymal hematoma 1/30/20 [Z98.890]  Not Applicable   • Hypertensive emergency [I16.1]  Yes   • Elevated transaminase levels [R74.0]  Yes   • Dyslipidemia [E78.5]  Yes   • Acute hypoxemic/hypercarbic respiratory failure due to ICH. Required Ventilatory support [J96.01, J96.02]  Yes   • JAYESH (obstructive sleep apnea) [G47.33]  Yes   • Tobacco abuse [Z72.0]  Yes      Resolved Hospital Problems    Diagnosis Date Resolved POA   • Hypokalemia [E87.6] 01/31/2020 Yes        Brief Hospital Course to date:  Yee Goodwin is a 49 y.o. female \"with relevant PMH of HTN, HLD, JAYESH, Tobacco abuse admitted 1/28/2020 with left frontal ICH and hypertensive emergency.  NIH was 25 and volume of hemorrhage was 43 cc with surrounding edema and associated subarachnoid hemorrhage creating mass-effect on the left lateral ventricle with rightward shift of 7 mm.  CTA revealed atherosclerosis of bilateral carotid bifurcations with 80% right and 75% left luminal narrowing as well as moderate to severe stenoses and irregularities throughout the MCA distribution bilaterally.  No vascular malformations / aneurysms however were noted.  Patient required intubation for airway protection.  Underwent left frontal craniotomy and evacuation of intraparenchymal hematoma on 1/30/20.  Had PEG placed 2/3/20\"    Acute L frontal ICH, likely due to uncontrolled HTN  -s/p " craniotomy and evacuation of intraparenchymal hematoma on 1/30    Acute hypoxic-hypercapneic resp failure secondary to ICH  -s/p mechanical ventilation     Hypertensive emergency  -controlled at this time, cont with current meds    Hyponatremia  -likely SIAD due to ICH  -monitor    Dysphagia  -s/p PEG tube, tolerating enteral feeding at this time    Urinary retention  -failed voiding trial in  The ICU, [facility] reanchored  -plan to send to [facility] with [facility] and SNF to do voiding trial    Fever  -no obvious infectious etiology  - had Cefazolin per Dr Young    Constipation  -resolved with bowel care regimen    JAYESH  CPAP at night    HLD  -Statin therapy    Tobacco abuse  -cessation advised    DVT Prophylaxis: Hep sc, cleared by [facility] intensivist's progress note    Disposition: Awaiting placement for rehab, precert for [facility] today.    CODE STATUS:   Code Status and Medical Interventions:   Ordered at: 01/28/20 0847     Level Of Support Discussed With:    Patient     Code Status:    CPR     Medical Interventions (Level of Support Prior to Arrest):    Full         Electronically signed by Catie Quesada MD, 02/06/20, 7:16 PM.

## 2020-02-07 NOTE — PROGRESS NOTES
Case Management Discharge Note      Final Note: Per Michell at [facility], they are able to accept patient to the Spinal Cord Unit today. Nurse to call report to [phone], dc summary will be pulled from [facility]. DELIA scheduled for 1515, PCS in the chart. Notified patient and SO at bedside and they are agreeable.          Destination - Selection Complete      Service Provider Request Status Selected Services Address Phone Number Fax Number    [facility] Selected Inpatient Rehabilitation [address] [facility] [address] [phone]           Transportation Services  Ambulance: [facility]    Final Discharge Disposition Code: 62 - inpatient rehab facility

## 2020-02-09 LAB
BACTERIA SPEC AEROBE CULT: NORMAL
BACTERIA SPEC AEROBE CULT: NORMAL

## 2020-02-24 ENCOUNTER — APPOINTMENT (OUTPATIENT)
Dept: CT IMAGING | Facility: HOSPITAL | Age: 50
End: 2020-02-24

## 2020-02-24 ENCOUNTER — APPOINTMENT (OUTPATIENT)
Dept: GENERAL RADIOLOGY | Facility: HOSPITAL | Age: 50
End: 2020-02-24

## 2020-02-24 ENCOUNTER — HOSPITAL ENCOUNTER (EMERGENCY)
Facility: HOSPITAL | Age: 50
Discharge: ANOTHER HOSPITAL | End: 2020-02-25
Attending: EMERGENCY MEDICINE | Admitting: EMERGENCY MEDICINE

## 2020-02-24 DIAGNOSIS — S20.211A CONTUSION OF RIB ON RIGHT SIDE, INITIAL ENCOUNTER: ICD-10-CM

## 2020-02-24 DIAGNOSIS — Z86.79 HISTORY OF INTRACRANIAL HEMORRHAGE: ICD-10-CM

## 2020-02-24 DIAGNOSIS — S01.01XA LACERATION OF SCALP, INITIAL ENCOUNTER: Primary | ICD-10-CM

## 2020-02-24 LAB
ANION GAP SERPL CALCULATED.3IONS-SCNC: 14 MMOL/L (ref 5–15)
BASOPHILS # BLD AUTO: 0.06 10*3/MM3 (ref 0–0.2)
BASOPHILS NFR BLD AUTO: 0.6 % (ref 0–1.5)
BUN BLD-MCNC: 12 MG/DL (ref 6–20)
BUN/CREAT SERPL: 27.9 (ref 7–25)
CALCIUM SPEC-SCNC: 10.3 MG/DL (ref 8.6–10.5)
CHLORIDE SERPL-SCNC: 96 MMOL/L (ref 98–107)
CO2 SERPL-SCNC: 24 MMOL/L (ref 22–29)
CREAT BLD-MCNC: 0.43 MG/DL (ref 0.57–1)
DEPRECATED RDW RBC AUTO: 42.7 FL (ref 37–54)
EOSINOPHIL # BLD AUTO: 0.37 10*3/MM3 (ref 0–0.4)
EOSINOPHIL NFR BLD AUTO: 3.8 % (ref 0.3–6.2)
ERYTHROCYTE [DISTWIDTH] IN BLOOD BY AUTOMATED COUNT: 12.3 % (ref 12.3–15.4)
GFR SERPL CREATININE-BSD FRML MDRD: >150 ML/MIN/1.73
GLUCOSE BLD-MCNC: 116 MG/DL (ref 65–99)
HCT VFR BLD AUTO: 38.2 % (ref 34–46.6)
HGB BLD-MCNC: 12.6 G/DL (ref 12–15.9)
IMM GRANULOCYTES # BLD AUTO: 0.04 10*3/MM3 (ref 0–0.05)
IMM GRANULOCYTES NFR BLD AUTO: 0.4 % (ref 0–0.5)
LYMPHOCYTES # BLD AUTO: 1.6 10*3/MM3 (ref 0.7–3.1)
LYMPHOCYTES NFR BLD AUTO: 16.4 % (ref 19.6–45.3)
MCH RBC QN AUTO: 30.9 PG (ref 26.6–33)
MCHC RBC AUTO-ENTMCNC: 33 G/DL (ref 31.5–35.7)
MCV RBC AUTO: 93.6 FL (ref 79–97)
MONOCYTES # BLD AUTO: 0.7 10*3/MM3 (ref 0.1–0.9)
MONOCYTES NFR BLD AUTO: 7.2 % (ref 5–12)
NEUTROPHILS # BLD AUTO: 6.98 10*3/MM3 (ref 1.7–7)
NEUTROPHILS NFR BLD AUTO: 71.6 % (ref 42.7–76)
NRBC BLD AUTO-RTO: 0 /100 WBC (ref 0–0.2)
PLATELET # BLD AUTO: 351 10*3/MM3 (ref 140–450)
PMV BLD AUTO: 9.6 FL (ref 6–12)
POTASSIUM BLD-SCNC: 4 MMOL/L (ref 3.5–5.2)
RBC # BLD AUTO: 4.08 10*6/MM3 (ref 3.77–5.28)
SODIUM BLD-SCNC: 134 MMOL/L (ref 136–145)
WBC NRBC COR # BLD: 9.75 10*3/MM3 (ref 3.4–10.8)

## 2020-02-24 PROCEDURE — 71045 X-RAY EXAM CHEST 1 VIEW: CPT

## 2020-02-24 PROCEDURE — 93005 ELECTROCARDIOGRAM TRACING: CPT | Performed by: EMERGENCY MEDICINE

## 2020-02-24 PROCEDURE — 90715 TDAP VACCINE 7 YRS/> IM: CPT | Performed by: EMERGENCY MEDICINE

## 2020-02-24 PROCEDURE — 70450 CT HEAD/BRAIN W/O DYE: CPT

## 2020-02-24 PROCEDURE — 99284 EMERGENCY DEPT VISIT MOD MDM: CPT

## 2020-02-24 PROCEDURE — 80048 BASIC METABOLIC PNL TOTAL CA: CPT | Performed by: EMERGENCY MEDICINE

## 2020-02-24 PROCEDURE — 25010000002 TDAP 5-2.5-18.5 LF-MCG/0.5 SUSPENSION: Performed by: EMERGENCY MEDICINE

## 2020-02-24 PROCEDURE — 90471 IMMUNIZATION ADMIN: CPT | Performed by: EMERGENCY MEDICINE

## 2020-02-24 PROCEDURE — 73060 X-RAY EXAM OF HUMERUS: CPT

## 2020-02-24 PROCEDURE — 85025 COMPLETE CBC W/AUTO DIFF WBC: CPT | Performed by: EMERGENCY MEDICINE

## 2020-02-24 PROCEDURE — 73030 X-RAY EXAM OF SHOULDER: CPT

## 2020-02-24 RX ORDER — LIDOCAINE HYDROCHLORIDE AND EPINEPHRINE 10; 10 MG/ML; UG/ML
10 INJECTION, SOLUTION INFILTRATION; PERINEURAL ONCE
Status: COMPLETED | OUTPATIENT
Start: 2020-02-24 | End: 2020-02-24

## 2020-02-24 RX ADMIN — LIDOCAINE HYDROCHLORIDE,EPINEPHRINE BITARTRATE 10 ML: 10; .01 INJECTION, SOLUTION INFILTRATION; PERINEURAL at 21:28

## 2020-02-24 RX ADMIN — TETANUS TOXOID, REDUCED DIPHTHERIA TOXOID AND ACELLULAR PERTUSSIS VACCINE, ADSORBED 0.5 ML: 5; 2.5; 8; 8; 2.5 SUSPENSION INTRAMUSCULAR at 21:27

## 2020-02-25 VITALS
BODY MASS INDEX: 23.46 KG/M2 | HEART RATE: 101 BPM | SYSTOLIC BLOOD PRESSURE: 124 MMHG | OXYGEN SATURATION: 95 % | DIASTOLIC BLOOD PRESSURE: 84 MMHG | WEIGHT: 146 LBS | HEIGHT: 66 IN | TEMPERATURE: 98.3 F | RESPIRATION RATE: 16 BRPM

## 2020-03-02 ENCOUNTER — TELEPHONE (OUTPATIENT)
Dept: INTERNAL MEDICINE | Facility: CLINIC | Age: 50
End: 2020-03-02

## 2020-03-02 NOTE — TELEPHONE ENCOUNTER
Patient is being D/C from [facility] on Wednesday 3/4/2020 from a stroke,  She has a hospital follow up appointment with Dr. Lucero on 3/11/2020 at 11:00am.  Thank you.

## 2020-03-04 ENCOUNTER — READMISSION MANAGEMENT (OUTPATIENT)
Dept: CALL CENTER | Facility: HOSPITAL | Age: 50
End: 2020-03-04

## 2020-03-04 NOTE — TELEPHONE ENCOUNTER
Msg forwarded to the Nurse Call Center for completion of the TCM call.  Please send future notifications to the Nurse Call Center clinical pool.

## 2020-03-04 NOTE — OUTREACH NOTE
Prep Survey      Responses   [facility] facility patient discharged from?  Non-BH   Is LACE score < 7 ?  Non-BH Discharge   Eligibility  [facility]   Date of Admission  02/07/20   Date of Discharge  03/04/20   Discharge diagnosis  Acute L Frontal ICH 1/28/20   Does the patient have one of the following disease processes/diagnoses(primary or secondary)?  Stroke (TIA)   Does the patient have Home health ordered?  No   Is there a DME ordered?  No   Prep survey completed?  Yes          Leticia Kapoor RN

## 2020-03-05 ENCOUNTER — TRANSITIONAL CARE MANAGEMENT TELEPHONE ENCOUNTER (OUTPATIENT)
Dept: CALL CENTER | Facility: HOSPITAL | Age: 50
End: 2020-03-05

## 2020-03-05 NOTE — OUTREACH NOTE
Call Center TCM Note      Responses   [facility] patient discharged from?  Non-   Does the patient have one of the following disease processes/diagnoses(primary or secondary)?  Stroke (TIA)   TCM attempt successful?  Yes   Call start time  1006   Call end time  1007   Discharge diagnosis  Acute L Frontal ICH 1/28/20   Does the patient have a primary care provider?   Yes   Does the patient have an appointment with their PCP within 7 days of discharge?  Yes   Comments regarding PCP  f/u with Dr. Lucero on 3/11 at 11 am   Has the patient kept scheduled appointments due by today?  N/A   Has home health visited the patient within 72 hours of discharge?  N/A   Psychosocial issues?  No   What is the patient's perception of their health status since discharge?  Improving   Is the patient/caregiver able to teach back the hierarchy of who to call/visit for symptoms/problems? PCP, Specialist, Home health nurse, Urgent Care, ED, 911  Yes   TCM call completed?  Yes   Wrap up additional comments  Patient says she is doing well after her discharge from [facility], no questions or concerns for her PCP at this time.          Shruthi Angeles RN    3/5/2020, 10:07 AM

## 2020-03-09 ENCOUNTER — OFFICE VISIT (OUTPATIENT)
Dept: NEUROSURGERY | Facility: CLINIC | Age: 50
End: 2020-03-09

## 2020-03-09 VITALS
OXYGEN SATURATION: 99 % | RESPIRATION RATE: 16 BRPM | SYSTOLIC BLOOD PRESSURE: 108 MMHG | BODY MASS INDEX: 23.46 KG/M2 | WEIGHT: 146 LBS | HEIGHT: 66 IN | HEART RATE: 75 BPM | DIASTOLIC BLOOD PRESSURE: 64 MMHG

## 2020-03-09 DIAGNOSIS — Z09 POSTOPERATIVE FOLLOW-UP: ICD-10-CM

## 2020-03-09 DIAGNOSIS — Z98.890 S/P CRANIOTOMY: ICD-10-CM

## 2020-03-09 DIAGNOSIS — I62.9 INTRACRANIAL HEMORRHAGE (HCC): Primary | ICD-10-CM

## 2020-03-09 DIAGNOSIS — M79.661 RIGHT CALF PAIN: ICD-10-CM

## 2020-03-09 PROCEDURE — 99024 POSTOP FOLLOW-UP VISIT: CPT | Performed by: PHYSICIAN ASSISTANT

## 2020-03-09 RX ORDER — PANTOPRAZOLE SODIUM 40 MG/1
40 TABLET, DELAYED RELEASE ORAL DAILY
COMMUNITY
End: 2022-11-17 | Stop reason: SDUPTHER

## 2020-03-09 RX ORDER — CARVEDILOL 12.5 MG/1
12.5 TABLET ORAL 2 TIMES DAILY WITH MEALS
COMMUNITY

## 2020-03-09 RX ORDER — OMEPRAZOLE 20 MG/1
20 CAPSULE, DELAYED RELEASE ORAL DAILY
Status: ON HOLD | COMMUNITY
End: 2020-09-08

## 2020-03-09 NOTE — PROGRESS NOTES
"Subjective     Chief Complaint: ***    Patient ID: Yee Goodwin is a 49 y.o. female {Specialty - why patient here?:[id number]}    History of Present Illness    ***    The following portions of the patient's history were reviewed and updated as appropriate: allergies, current medications, past family history, past medical history, past social history, past surgical history and problem list.    Family history:   Family History   Problem Relation Age of Onset   • Arthritis Mother    • Hypertension Mother    • Stroke Mother    • Arthritis Father    • Cancer Father    • Lung cancer Father    • Throat cancer Father    • Heart disease Sister    • Pulmonary embolism Brother        Social history:   Social History     Socioeconomic History   • Marital status:      Spouse name: Not on file   • Number of children: Not on file   • Years of education: Not on file   • Highest education level: Not on file   Tobacco Use   • Smoking status: Current Every Day Smoker     Packs/day: 0.50     Years: 25.00     Pack years: 12.50     Types: Cigarettes   • Smokeless tobacco: Never Used   Substance and Sexual Activity   • Alcohol use: Yes     Comment: weekly- couple drinks    • Drug use: No   • Sexual activity: Defer       Review of Systems   HENT: Negative.    Eyes: Negative.    Respiratory: Negative.    Cardiovascular: Negative.    Gastrointestinal: Positive for abdominal pain.   Endocrine: Negative.    Genitourinary: Negative.    Musculoskeletal: Positive for back pain.   Skin: Negative.    Allergic/Immunologic: Negative.    Neurological: Positive for speech difficulty, weakness and numbness.   Hematological: Negative.    Psychiatric/Behavioral: Negative.        Objective   Blood pressure 108/64, pulse 75, resp. rate 16, height 167.6 cm (66\"), weight 66.2 kg (146 lb), SpO2 99 %, not currently breastfeeding.  Body mass index is 23.57 kg/m².  Patient's Body mass index is 23.57 kg/m². BMI is {BMI range:87646}.      Physical " Exam      Assessment/Plan       ***    There are no diagnoses linked to this encounter.    No follow-ups on file.             Dari Corona MA

## 2020-03-09 NOTE — PROGRESS NOTES
HPI  Yee Goodwin presents for postoperative follow up s/p frontal craniotomy for evacuation of hematoma 1/30/2020. The patient has had no issues with the wound. The patient's reports Recent discharge from [facility] to home.  She has had excellent recovery following large intraparenchymal hematoma evacuation.  Patient is able to move some in her right upper and lower extremity.  Her speech is improving, continues to have a aphasia.  She recently had a fall 2 weeks ago while at [facility] and was seen at [facility] ED, head CT was performed any laceration on the right side of her forehead was repaired.  No other complaints at this time, they are awaiting home health PT/OT to be set up.  They are very pleased with her postoperative recovery thus far.     The patient is complaining of a 1-2-day history of right calf pain and tenderness.  She denies any left-sided symptoms.  No fevers.        Physical Exam  General:  alert and cooperative  Incision:   no drainage, no erythema, no seroma, no swelling, incision well approximated, small area of crusting at anterior pole of incision.  -Sutures removed from laceration repair on right forehead. Incision well healed.      Assessment/Plan   Assessment:    1. Acute L frontal ICH 1/28/20    2. S/P L frontal craniotomy and evacuation of intraparenchymal hematoma 1/30/20    3. Postoperative follow-up    4. Right calf pain        Plan:   Continue postoperative wound care as instructed  Dr. Cole came in and had a long discussion with the patient and her significant other regarding the time it can take to recover from this type of stroke.  The patient should continue with PT, OT, and speech therapy.  We will follow-up with her in 3 months, or sooner if clinically indicated.  I have instructed them to call with any questions, concerns, new or worsening symptoms.    In regards to her new right calf pain, I have sent the patient down for a lower extremity ultrasound to rule out  DVT.  I will call her tomorrow with the results of this.        Return in about 3 months (around 6/9/2020), or if symptoms worsen or fail to improve.     Kathy Del Real PA-C  3/10/2020

## 2020-03-10 ENCOUNTER — TELEPHONE (OUTPATIENT)
Dept: NEUROSURGERY | Facility: CLINIC | Age: 50
End: 2020-03-10

## 2020-03-10 NOTE — TELEPHONE ENCOUNTER
Lower extremity duplex reviewed that was performed yesterday.    No evidence of acute DVT. Please call and let patient know.

## 2020-04-28 ENCOUNTER — TRANSCRIBE ORDERS (OUTPATIENT)
Dept: GENERAL RADIOLOGY | Facility: HOSPITAL | Age: 50
End: 2020-04-28

## 2020-04-28 ENCOUNTER — HOSPITAL ENCOUNTER (OUTPATIENT)
Dept: GENERAL RADIOLOGY | Facility: HOSPITAL | Age: 50
Discharge: HOME | End: 2020-04-28
Admitting: PAIN MEDICINE

## 2020-04-28 DIAGNOSIS — M13.0 POLYARTHROPATHY: ICD-10-CM

## 2020-04-28 DIAGNOSIS — M13.0 POLYARTHROPATHY: Primary | ICD-10-CM

## 2020-04-28 PROCEDURE — 73030 X-RAY EXAM OF SHOULDER: CPT

## 2020-05-06 ENCOUNTER — OFFICE VISIT (OUTPATIENT)
Dept: NEUROSURGERY | Facility: CLINIC | Age: 50
End: 2020-05-06

## 2020-05-06 ENCOUNTER — TELEPHONE (OUTPATIENT)
Dept: NEUROSURGERY | Facility: CLINIC | Age: 50
End: 2020-05-06

## 2020-05-06 VITALS — TEMPERATURE: 98 F | RESPIRATION RATE: 15 BRPM | BODY MASS INDEX: 23.3 KG/M2 | HEIGHT: 66 IN | WEIGHT: 145 LBS

## 2020-05-06 DIAGNOSIS — Z98.890 S/P CRANIOTOMY: Primary | ICD-10-CM

## 2020-05-06 DIAGNOSIS — T81.89XA DELAYED SURGICAL WOUND HEALING, INITIAL ENCOUNTER: ICD-10-CM

## 2020-05-06 PROCEDURE — 99213 OFFICE O/P EST LOW 20 MIN: CPT | Performed by: PHYSICIAN ASSISTANT

## 2020-05-06 NOTE — TELEPHONE ENCOUNTER
Pt's significant other called regarding incision from 1-30-20 craniotomy, the wound is not healing, it is oozing a small amount of blood tinged fluid, no pus.  Please call Mr. Wong. [phone].

## 2020-05-06 NOTE — TELEPHONE ENCOUNTER
Yessica with [facility] is calling to see if there is going to be specific wound care directions. If so please fax the orders to [facility] at [phone]

## 2020-05-06 NOTE — TELEPHONE ENCOUNTER
Marvin Saavedra(POA) is txt image to my cell phone since he is unable to upload anything to [facility].  I will forward PAs once received.

## 2020-05-06 NOTE — TELEPHONE ENCOUNTER
Provider:  Douglas  Phone #: [phone]   Surgery:  Crani for evacuation of intraparenchymal hematoma  Surgery Date:  01/30/20  Last visit:   03/09/20  Next visit: 06/12/20    Reason for call:          See ET's note:

## 2020-05-06 NOTE — TELEPHONE ENCOUNTER
Pt is to be seen today by SUYAPA Del Real PA-C today at 1pm.  I have scheduled pt. Marvin Siddiqui is aware.

## 2020-05-06 NOTE — TELEPHONE ENCOUNTER
Can we please have her send a picture of where it is oozing.   - we can show to Kathy and plan to have her come in to the office as needed

## 2020-05-07 NOTE — PROGRESS NOTES
"Subjective     Chief Complaint: Wound check    Patient ID: Yee Goodwin is a 49 y.o. female is here today for follow-up.    History of Present Illness    This is a 49-year-old woman who underwent a frontal craniotomy for evacuation of a large intraparenchymal hematoma on 1/30/2020.  Patient was discharged from [facility] on 2/7/2020 to [facility].  She was seen in the office for a postoperative visit on 3/9/2020.  At that time, her wound was healing well with no drainage or erythema.  She is residing at home with her significant other that helps her with ADLs.  She continues to have improvement in neurologic status and participating in speech/PT/OT.      The patient's significant other called our office yesterday with complaints of poor wound healing and wound drainage.  She was instructed to come to our office immediately for a wound check.  He states that the patient has had a large scab over her incision that he began noticing about a month ago.  He reports that around 3 weeks ago the scab fell off and he \"was able to see her skull\".  Since that time, he states the scab has re-healed and fallen off one other time.  Denies any purulent drainage from incision.  There is mild amount of oozing after showers.  Denies any surrounding erythema, warmth.  Denies fevers, chills, nausea or vomiting. Patient and spouse state she has not been picking at incision.     The following portions of the patient's history were reviewed and updated as appropriate: allergies, current medications, past family history, past medical history, past social history, past surgical history and problem list.    Family history:   Family History   Problem Relation Age of Onset   • Arthritis Mother    • Hypertension Mother    • Stroke Mother    • Arthritis Father    • Cancer Father    • Lung cancer Father    • Throat cancer Father    • Heart disease Sister    • Pulmonary embolism Brother        Social history:   Social History " "    Socioeconomic History   • Marital status:      Spouse name: Not on file   • Number of children: Not on file   • Years of education: Not on file   • Highest education level: Not on file   Tobacco Use   • Smoking status: Current Every Day Smoker     Packs/day: 0.50     Years: 25.00     Pack years: 12.50     Types: Cigarettes   • Smokeless tobacco: Never Used   Substance and Sexual Activity   • Alcohol use: Yes     Comment: weekly- couple drinks    • Drug use: No   • Sexual activity: Defer       Review of Systems   Constitutional: Negative for activity change, appetite change, chills, diaphoresis, fatigue, fever and unexpected weight change.   Respiratory: Negative for cough and shortness of breath.    Cardiovascular: Negative for chest pain.   Gastrointestinal: Negative for nausea and vomiting.   Musculoskeletal: Negative for neck pain and neck stiffness.   Neurological: Positive for facial asymmetry, speech difficulty, weakness and numbness.   All other systems reviewed and are negative.      Objective   Temperature 98 °F (36.7 °C), temperature source Temporal, resp. rate 15, height 167.6 cm (66\"), weight 65.8 kg (145 lb), not currently breastfeeding.  Body mass index is 23.4 kg/m².  Patient's Body mass index is 23.4 kg/m². BMI is within normal parameters. No follow-up required..      Physical Exam   Constitutional: No distress.   Chronically ill-appearing   HENT:   Head:       Cardiovascular: Normal rate.   Pulmonary/Chest: Effort normal.   Neurological: A sensory deficit is present.   Right-sided weakness.  Patient uses wheelchair   Skin: Skin is warm and dry. She is not diaphoretic. No erythema.                   Assessment/Plan     Ms. Goodwin is a 49-year-old woman who is status post frontal craniotomy for evacuation of a large intraparenchymal hematoma on 1/30/2020.  Patient was last seen in the office on 3/9/2020 for postoperative visit.  At that time, the patient was approximately 6 weeks postop and " her wound was healing well with no evidence of wound dehiscence or infection.  Patient significant other states today that she has had a large, nonhealing scab over her craniotomy incision that he has noticed over the last 4 weeks.  He states the scab has fallen off 2 times (3 weeks and 2 weeks ago) and he was able to see bone underneath the incision when this happened.  He did not call our office with any wound complaints until yesterday at which time he was instructed to present urgently to our office for a wound check.      Dr. Cole came into the room and discussed with the patient and her significant other that it appears the wound is healing by secondary intention.  The granulation tissue is firm, nonmobile, there is no drainage or surrounding erythema.  We discussed at length with the patient's partner that if this scab/wound were to fall off again, the patient would require return to the operating room for wound repair.  Return to clinic and ED criteria were reviewed at length.  He was instructed to call should the patient have any fever/chills, nausea/vomiting, wound drainage, or evidence of wound dehiscence.  They voiced understanding.  She will follow-up in 2 weeks for a wound check, or sooner if clinically indicated.    Yee was seen today for acute l frontal ich.    Diagnoses and all orders for this visit:    S/P L frontal craniotomy and evacuation of intraparenchymal hematoma 1/30/20    Delayed surgical wound healing, initial encounter        Return in about 2 weeks (around 5/20/2020), or if symptoms worsen or fail to improve.             Kathy Del Real PA-C

## 2020-05-22 ENCOUNTER — OFFICE VISIT (OUTPATIENT)
Dept: NEUROSURGERY | Facility: CLINIC | Age: 50
End: 2020-05-22

## 2020-05-22 VITALS — HEIGHT: 66 IN | WEIGHT: 145 LBS | BODY MASS INDEX: 23.3 KG/M2

## 2020-05-22 DIAGNOSIS — Z98.890 S/P CRANIOTOMY: Primary | ICD-10-CM

## 2020-05-22 DIAGNOSIS — T81.89XD DELAYED SURGICAL WOUND HEALING, SUBSEQUENT ENCOUNTER: ICD-10-CM

## 2020-05-22 PROCEDURE — 99213 OFFICE O/P EST LOW 20 MIN: CPT | Performed by: PHYSICIAN ASSISTANT

## 2020-05-22 NOTE — PROGRESS NOTES
NAME: EILEEN HAMILTON   DOS: 2020  : 1970  PCP: Ozzie Lucero MD    Chief Complaint:  Wound Check     History of Present Illness: Ms. Hamilton is a 49 y.o. female  who is status post a frontal craniotomy for a evacuation of a large intraparenchymal hematoma on 2020.  She was discharged from [facility] on 2020 to [facility].  At her initial postoperative visit on 3/9/2020, her wound was healing appropriately with no drainage or erythema.  On 2020, she called to our office with complaints of poor wound healing and wound drainage.  Subsequently, patient was seen for a wound check, at that time revealed a large, nonhealing scab over her craniotomy incision that they noted was there for 4 weeks.  Her significant other stated that the scab had fallen off 2 times, at which time he was able to see the bone underneath the incision.  They did not call our office when these 2 incidences occurred.     Today, patient and her significant other notes that the incision has continued to heal.  They deny that it has fallen off since the last visit.  Denies any purulent drainage from incision.  Denies surrounding erythema or warmth.  Denies fever, chills, nausea, or vomiting.  Has no other questions or concerns at this time.      Past Medical History:   Diagnosis Date   • Arthritis    • Diverticulitis    • GERD (gastroesophageal reflux disease)    • Hypertension        Past Surgical History:   Procedure Laterality Date   • CEREBRAL ANGIOGRAM N/A 2020    Procedure: Cerebral angiogram;  Surgeon: Merlin Black MD;  Location: [facility] CATH INVASIVE LOCATION;  Service: Interventional Radiology   • CHOLECYSTECTOMY     • COLONOSCOPY     • CRANIOTOMY FOR SUBDURAL HEMATOMA Left 2020    Procedure: FRONTAL CRANIOTOMY FOR EVACUATION OF INTRAPARENCHYMAL HEMATOMA  LEFT;  Surgeon: Jose Cole MD;  Location: [facility] OR;  Service: Neurosurgery;  Laterality:  Left;   • ENDOSCOPY W/ PEG TUBE PLACEMENT N/A 2/3/2020    Procedure: ESOPHAGOGASTRODUODENOSCOPY WITH PERCUTANEOUS ENDOSCOPIC GASTROSTOMY TUBE INSERTION AT BEDSIDE;  Surgeon: Andrew Young MD;  [facility];  Service: General;  Laterality: N/A;   • HAND TENDON SURGERY Right     patient hand right thumb tendon surgery   • HIP SURGERY     • JOINT REPLACEMENT     • TUBAL ABDOMINAL LIGATION               Review of Systems   All other systems reviewed and are negative.       Medications:    Current Outpatient Medications:   •  acetaminophen (TYLENOL) 325 MG tablet, Take 2 tablets by mouth Every 6 (Six) Hours As Needed for Mild Pain ., Disp: , Rfl:   •  carvedilol (COREG) 12.5 MG tablet, Take 12.5 mg by mouth 2 (Two) Times a Day With Meals., Disp: , Rfl:   •  omeprazole (priLOSEC) 20 MG capsule, Take 20 mg by mouth Daily., Disp: , Rfl:   •  pantoprazole (PROTONIX) 20 MG EC tablet, Take 20 mg by mouth Daily., Disp: , Rfl:     Allergies:  Allergies   Allergen Reactions   • Venlafaxine Rash       Social History     Tobacco Use   • Smoking status: Current Every Day Smoker     Packs/day: 0.50     Years: 25.00     Pack years: 12.50     Types: Cigarettes   • Smokeless tobacco: Never Used   Substance Use Topics   • Alcohol use: Yes     Comment: weekly- couple drinks    • Drug use: No       Family History   Problem Relation Age of Onset   • Arthritis Mother    • Hypertension Mother    • Stroke Mother    • Arthritis Father    • Cancer Father    • Lung cancer Father    • Throat cancer Father    • Heart disease Sister    • Pulmonary embolism Brother        Review of Imaging:  No new imaging to review      There were no vitals filed for this visit.  Body mass index is 23.4 kg/m².    Physical Exam   Constitutional: She is oriented to person, place, and time. She appears distressed.   Chronically ill-appearing   HENT:   Head:       Firm area of granulation tissue  Wound appears to be healing by secondary  intention.  There is no evidence of erythema, drainage, fluctuation or tenderness   Pulmonary/Chest: Effort normal.   Neurological: She is oriented to person, place, and time. No sensory deficit.   Skin: Skin is warm and dry.     Neurologic Exam     Mental Status   Oriented to person, place, and time.     Cranial Nerves   Cranial nerves II through XII intact.     Motor Exam Right upper and lower extremity weakness.  Patient is able to knee flex/extend passively, notes more difficulty with ankle dorsiflexion/plantarflexion and hip flexion/extension  Unable to move right arm  He is in wheelchair       Diagnoses/Plan:    Ms. Goodwin is a 49 y.o. female who is status post frontal craniotomy for evacuation of large intracranial cannula muscle hematoma on 1/30/2020.  Patient was last seen in office on 5/6/2020 for wound check.  At that time, there was evidence of a large, nonhealing scab over her craniotomy incision that patient stated had been there for the last 4 weeks and had fallen off twice.  Prior to that exam, she was seen for a wound check 6 weeks postoperatively with wound healing appropriately.  Today, patient's wound is continuing to heal and is noted to have not fallen off since last visit.  Therefore, patient was instructed to follow-up in office in 2 weeks for another wound check, or sooner if clinically indicated.  Dr. Cole came in the room and discussed at length if the scab/wound was to fall off, patient needs to call our office and that will require return to the operating room for wound repair.  They were instructed that if patient experiences any fever, chills, nausea/vomiting, wound drainage, or evidence of wound dehiscence they need to return to clinic or ED. Patient was understanding of this plan and willing to proceed.      Patient's Body mass index is 23.4 kg/m². BMI is within normal parameters. No follow-up required.      Keerthi Lott PA-C

## 2020-06-12 ENCOUNTER — TELEMEDICINE (OUTPATIENT)
Dept: NEUROSURGERY | Facility: CLINIC | Age: 50
End: 2020-06-12

## 2020-06-12 DIAGNOSIS — Z98.890 S/P CRANIOTOMY: Primary | ICD-10-CM

## 2020-06-12 DIAGNOSIS — T81.89XD DELAYED SURGICAL WOUND HEALING, SUBSEQUENT ENCOUNTER: ICD-10-CM

## 2020-06-12 PROCEDURE — 99213 OFFICE O/P EST LOW 20 MIN: CPT | Performed by: PHYSICIAN ASSISTANT

## 2020-06-12 NOTE — PROGRESS NOTES
NAME: EILEEN HAMILTON   DOS: 2020  : 1970  PCP: Ozzie Lucero MD  Type of Service: Appointment via telemedicine  You have chosen to receive care through a telehealth visit.  Do you consent to use a video connection for your medical care today? Yes   Mode of Transmission: Telemedicine via 2 way interactive video  telecommunication    Chief Complaint: Wound check    History of Present Illness: Ms. Hamilton is a 50 y.o. female who is status post a frontal craniotomy for a evacuation of a large intraparenchymal hematoma on 2020.  She was discharged from [facility] on 2020 to [facility].  At her initial postoperative visit on 3/9/2020, her wound was healing appropriately with no drainage or erythema.  On 2020, she called to our office with complaints of poor wound healing and wound drainage.  Subsequently, patient was seen for a wound check, at that time revealed a large, nonhealing scab over her craniotomy incision that they noted was there for 4 weeks.  Her significant other stated that the scab had fallen off 2 times, at which time he was able to see the bone underneath the incision.  They did not call our office when these 2 incidences occurred.     Today, patient notes that her incision has continued to heal.  Most notably, the incision is more narrow in the middle and appears to be shorter.  Although, she does note that she has had some green discharge in the last few days.  She notes she discovered when touching the wound.  Notes overall it is less than a dime size.  Denies that the scab has fallen off.  Denies any surrounding erythema or warmth.  Denies any fever, chills, nausea, or vomiting.  Has no other concerns at this time.    Past Medical History:   Diagnosis Date   • Arthritis    • Diverticulitis    • GERD (gastroesophageal reflux disease)    • Hypertension        Past Surgical History:   Procedure Laterality Date   • CEREBRAL ANGIOGRAM N/A  2020    Procedure: Cerebral angiogram;  Surgeon: Merlin Black MD;  Location:  [facility] CATH INVASIVE LOCATION;  Service: Interventional Radiology   • CHOLECYSTECTOMY     • COLONOSCOPY  2015   • CRANIOTOMY FOR SUBDURAL HEMATOMA Left 2020    Procedure: FRONTAL CRANIOTOMY FOR EVACUATION OF INTRAPARENCHYMAL HEMATOMA  LEFT;  Surgeon: Jose Cole MD;  Location:  [facility] OR;  Service: Neurosurgery;  Laterality: Left;   • ENDOSCOPY W/ PEG TUBE PLACEMENT N/A 2/3/2020    Procedure: ESOPHAGOGASTRODUODENOSCOPY WITH PERCUTANEOUS ENDOSCOPIC GASTROSTOMY TUBE INSERTION AT BEDSIDE;  Surgeon: Andrew Young MD;  Location:  [facility] ENDOSCOPY;  Service: General;  Laterality: N/A;   • HAND TENDON SURGERY Right     patient hand right thumb tendon surgery   • HIP SURGERY     • JOINT REPLACEMENT     • TUBAL ABDOMINAL LIGATION               Review of Systems   All other systems reviewed and are negative.       Medications:    Current Outpatient Medications:   •  acetaminophen (TYLENOL) 325 MG tablet, Take 2 tablets by mouth Every 6 (Six) Hours As Needed for Mild Pain ., Disp: , Rfl:   •  carvedilol (COREG) 12.5 MG tablet, Take 12.5 mg by mouth 2 (Two) Times a Day With Meals., Disp: , Rfl:   •  omeprazole (priLOSEC) 20 MG capsule, Take 20 mg by mouth Daily., Disp: , Rfl:   •  pantoprazole (PROTONIX) 20 MG EC tablet, Take 20 mg by mouth Daily., Disp: , Rfl:     Allergies:  Allergies   Allergen Reactions   • Venlafaxine Rash       Social History     Tobacco Use   • Smoking status: Former Smoker     Packs/day: 0.50     Years: 25.00     Pack years: 12.50     Types: Cigarettes     Last attempt to quit: 2019     Years since quittin.0   • Smokeless tobacco: Never Used   Substance Use Topics   • Alcohol use: Yes     Comment: weekly- couple drinks    • Drug use: No       Family History   Problem Relation Age of Onset   • Arthritis Mother    • Hypertension Mother    • Stroke Mother    • Arthritis Father    • Cancer  Father    • Lung cancer Father    • Throat cancer Father    • Heart disease Sister    • Pulmonary embolism Brother        Review of Imaging:      There were no vitals filed for this visit.  There is no height or weight on file to calculate BMI.    Physical Exam   Constitutional: She is oriented to person, place, and time.   Appears chronically ill   HENT:   Head: Normocephalic and atraumatic.       Wound appears to be healing by secondary intention.   Through the video camera, there was no evidence of erythema, fluctuance, and when her  touched the wound did not appear to be tender  In addition, there was no evidence of pus at this time, noted by me or by patient/   Pulmonary/Chest: Effort normal. No respiratory distress.   Musculoskeletal:   Continues to have right upper and lower extremity weakness  Unable to move right arm  Continues to be in wheelchair   Neurological: She is alert and oriented to person, place, and time.     Neurologic Exam     Mental Status   Oriented to person, place, and time.       Diagnoses/Plan:    Ms. Goodwin is a 50 y.o. female who is status post frontal craniotomy for evacuation of a large intraparenchymal hematoma on 1/30/2020.  Patient's last visit for wound check was on 5/22/2020, which at that time there was evidence of a large, nonhealing scab over her craniotomy incision, that has been noted to fall off twice between 3/9/2020 and 5/6/2020.  Although, in the last month, patient notes that the incision has continued to heal appropriately and has not fallen off.  In the last few days, however, patient does note a small amount of green purulence.  But at the time of visit, did not have any or could not produce any purulence. She denied any signs of infection and notes that the incision does appear to be healing appropriately.  Therefore, we will have patient follow-up next week in office for further evaluation and was instructed to take a picture if she experiences any  more drainage.  Patient was understanding this plan and willing to proceed.      Patient's There is no height or weight on file to calculate BMI. BMI is within normal parameters. No follow-up required..     Yee Goodwin  reports that she quit smoking about a year ago. Her smoking use included cigarettes. She has a 12.50 pack-year smoking history. She has never used smokeless tobacco.. I have educated her on the risk of diseases from using tobacco products such as cancer, COPD, heart diease and Decreased ability of healing.        Keerthi Lott PA-C    Televisit Start Time: 1120  Televisit End Time: 1139

## 2020-06-17 ENCOUNTER — TRANSCRIBE ORDERS (OUTPATIENT)
Dept: ADMINISTRATIVE | Facility: HOSPITAL | Age: 50
End: 2020-06-17

## 2020-06-17 DIAGNOSIS — M54.50 LOW BACK PAIN, UNSPECIFIED BACK PAIN LATERALITY, UNSPECIFIED CHRONICITY, UNSPECIFIED WHETHER SCIATICA PRESENT: ICD-10-CM

## 2020-06-17 DIAGNOSIS — M25.511 RIGHT SHOULDER PAIN, UNSPECIFIED CHRONICITY: Primary | ICD-10-CM

## 2020-06-19 ENCOUNTER — LAB (OUTPATIENT)
Dept: LAB | Facility: HOSPITAL | Age: 50
End: 2020-06-19

## 2020-06-19 ENCOUNTER — OFFICE VISIT (OUTPATIENT)
Dept: NEUROSURGERY | Facility: CLINIC | Age: 50
End: 2020-06-19

## 2020-06-19 VITALS — HEIGHT: 66 IN | WEIGHT: 150 LBS | TEMPERATURE: 97.6 F | BODY MASS INDEX: 24.11 KG/M2

## 2020-06-19 DIAGNOSIS — T81.31XD DEHISCENCE OF OPERATIVE WOUND, SUBSEQUENT ENCOUNTER: ICD-10-CM

## 2020-06-19 DIAGNOSIS — Z98.890 S/P CRANIOTOMY: Primary | ICD-10-CM

## 2020-06-19 DIAGNOSIS — Z98.890 S/P CRANIOTOMY: ICD-10-CM

## 2020-06-19 LAB
BASOPHILS # BLD AUTO: 0.08 10*3/MM3 (ref 0–0.2)
BASOPHILS NFR BLD AUTO: 1.3 % (ref 0–1.5)
CRP SERPL-MCNC: 0.13 MG/DL (ref 0–0.5)
DEPRECATED RDW RBC AUTO: 40 FL (ref 37–54)
EOSINOPHIL # BLD AUTO: 0.28 10*3/MM3 (ref 0–0.4)
EOSINOPHIL NFR BLD AUTO: 4.5 % (ref 0.3–6.2)
ERYTHROCYTE [DISTWIDTH] IN BLOOD BY AUTOMATED COUNT: 12.9 % (ref 12.3–15.4)
ERYTHROCYTE [SEDIMENTATION RATE] IN BLOOD: 17 MM/HR (ref 0–30)
HCT VFR BLD AUTO: 40.2 % (ref 34–46.6)
HGB BLD-MCNC: 13.5 G/DL (ref 12–15.9)
IMM GRANULOCYTES # BLD AUTO: 0.01 10*3/MM3 (ref 0–0.05)
IMM GRANULOCYTES NFR BLD AUTO: 0.2 % (ref 0–0.5)
LYMPHOCYTES # BLD AUTO: 1.98 10*3/MM3 (ref 0.7–3.1)
LYMPHOCYTES NFR BLD AUTO: 31.6 % (ref 19.6–45.3)
MCH RBC QN AUTO: 28.2 PG (ref 26.6–33)
MCHC RBC AUTO-ENTMCNC: 33.6 G/DL (ref 31.5–35.7)
MCV RBC AUTO: 84.1 FL (ref 79–97)
MONOCYTES # BLD AUTO: 0.49 10*3/MM3 (ref 0.1–0.9)
MONOCYTES NFR BLD AUTO: 7.8 % (ref 5–12)
NEUTROPHILS # BLD AUTO: 3.43 10*3/MM3 (ref 1.7–7)
NEUTROPHILS NFR BLD AUTO: 54.6 % (ref 42.7–76)
NRBC BLD AUTO-RTO: 0 /100 WBC (ref 0–0.2)
PLATELET # BLD AUTO: 329 10*3/MM3 (ref 140–450)
PMV BLD AUTO: 10.1 FL (ref 6–12)
RBC # BLD AUTO: 4.78 10*6/MM3 (ref 3.77–5.28)
WBC NRBC COR # BLD: 6.27 10*3/MM3 (ref 3.4–10.8)

## 2020-06-19 PROCEDURE — 85025 COMPLETE CBC W/AUTO DIFF WBC: CPT

## 2020-06-19 PROCEDURE — 99211 OFF/OP EST MAY X REQ PHY/QHP: CPT | Performed by: PHYSICIAN ASSISTANT

## 2020-06-19 PROCEDURE — 86140 C-REACTIVE PROTEIN: CPT

## 2020-06-19 PROCEDURE — 36415 COLL VENOUS BLD VENIPUNCTURE: CPT

## 2020-06-19 PROCEDURE — 85652 RBC SED RATE AUTOMATED: CPT | Performed by: PHYSICIAN ASSISTANT

## 2020-06-19 NOTE — PROGRESS NOTES
Subjective     Chief Complaint: Wound check    Patient ID: Yee Goodwin is a 50 y.o. female is here today for follow-up.    History of Present Illness    This is a 49-year-old woman who underwent a frontal craniotomy for evacuation of a large intraparenchymal hematoma in January 2020.  At her initial postoperative visit on 3/9/2020, the patient's wound was healing well with no drainage or erythema.  Patient's spouse called our office 6 weeks ago with complaints of wound scabbing and drainage.  She was seen in our office with a large nonhealing scab over her craniotomy incision that the  states had been there for 4 weeks and fallen off 2 times during that period.    She is seen today in routine follow-up.  Her significant other is assisting with the history.  They state there is been a very small amount of green drainage after the patient showers.  This is nonpurulent.  They deny any episodes of the scab falling off.  Denies any surrounding erythema.  Denies fever, chills, nausea, vomiting.    The following portions of the patient's history were reviewed and updated as appropriate: allergies, current medications, past family history, past medical history, past social history, past surgical history and problem list.    Family history:   Family History   Problem Relation Age of Onset   • Arthritis Mother    • Hypertension Mother    • Stroke Mother    • Arthritis Father    • Cancer Father    • Lung cancer Father    • Throat cancer Father    • Heart disease Sister    • Pulmonary embolism Brother        Social history:   Social History     Socioeconomic History   • Marital status:      Spouse name: Not on file   • Number of children: Not on file   • Years of education: Not on file   • Highest education level: Not on file   Tobacco Use   • Smoking status: Former Smoker     Packs/day: 0.50     Years: 25.00     Pack years: 12.50     Types: Cigarettes     Last attempt to quit: 6/1/2019     Years since  "quittin.0   • Smokeless tobacco: Never Used   Substance and Sexual Activity   • Alcohol use: Yes     Comment: weekly- couple drinks    • Drug use: No   • Sexual activity: Defer       Review of Systems   All other systems reviewed and are negative.      Objective   Temperature 97.6 °F (36.4 °C), height 167.6 cm (66\"), weight 68 kg (150 lb), not currently breastfeeding.  Body mass index is 24.21 kg/m².  Patient's Body mass index is 24.21 kg/m². BMI is within normal parameters. No follow-up required..      Physical Exam   Constitutional: She is oriented to person, place, and time. She appears well-developed and well-nourished. No distress.   Pulmonary/Chest: Effort normal.   Neurological: She is alert and oriented to person, place, and time.   Skin: Skin is warm and dry. She is not diaphoretic. No erythema.   No active wound drainage.  Drainage is not able to be expressed from incision.  Firm granulation tissue.  Decreased in size as compared to previous office visits.   Psychiatric: She has a normal mood and affect. Her behavior is normal.       Assessment/Plan     This is a 50-year-old woman who is seen today for a wound check.  The patient states she has had a small amount of green-tinged drainage after she showers.  She denies any ongoing drainage other than this episode.  Denies purulent drainage, fevers or chills.  I sent the patient for a CBC, CRP, and ESR.  Her wound is intact and there is no evidence that the granulation tissue over her incision is going to fall off.  No obvious wound dehiscence or erythema.    I again had a long discussion with the patient and her spouse regarding return to ED criteria.  If she experiences any fevers greater than 101.5, chills, wound drainage or evidence of wound dehiscence, they are to call our office or return to ED.  They voiced understanding of this plan.  I will call the patient with the results of her labs.  She will follow-up in 2 weeks for additional wound " john Fraire was seen today for follow-up.    Diagnoses and all orders for this visit:    S/P L frontal craniotomy and evacuation of intraparenchymal hematoma 1/30/20  -     Sedimentation Rate  -     C-reactive Protein; Future  -     CBC & Differential; Future    Dehiscence of operative wound, subsequent encounter  -     Sedimentation Rate  -     C-reactive Protein; Future  -     CBC & Differential; Future        Return in about 2 weeks (around 7/3/2020), or if symptoms worsen or fail to improve.             Kathy Del Real PA-C

## 2020-06-22 ENCOUNTER — TELEPHONE (OUTPATIENT)
Dept: NEUROSURGERY | Facility: CLINIC | Age: 50
End: 2020-06-22

## 2020-06-22 NOTE — TELEPHONE ENCOUNTER
Lab results reviewed.  WBC 6.27, CRP 0.13, sed rate 17.  No signs of infection on lab work.  Called the patient and her significant other and review lab results with them.  They deny any drainage.  She has had a couple spots of possible drainage after showering, however there is no persistent drainage or drainage able to be expressed.  Scab is still in place over incision.  Denies fevers or chills.  I again reviewed return to ED criteria with him.  They are instructed call with new worsening symptoms.  They will follow-up in 1.5 weeks for another wound check.

## 2020-07-01 ENCOUNTER — TELEMEDICINE (OUTPATIENT)
Dept: NEUROSURGERY | Facility: CLINIC | Age: 50
End: 2020-07-01

## 2020-07-01 DIAGNOSIS — T81.89XD DELAYED SURGICAL WOUND HEALING, SUBSEQUENT ENCOUNTER: ICD-10-CM

## 2020-07-01 DIAGNOSIS — Z98.890 S/P CRANIOTOMY: Primary | ICD-10-CM

## 2020-07-01 PROCEDURE — 99213 OFFICE O/P EST LOW 20 MIN: CPT | Performed by: PHYSICIAN ASSISTANT

## 2020-07-01 NOTE — PROGRESS NOTES
Subjective     Chief Complaint: Follow-up wound check    Patient ID: Yee Goodwin is a 50 y.o. female is here today for follow-up.    History of Present Illness    This is a 49-year-old woman who underwent a frontal craniotomy for evacuation of a hematoma in 2020.  She has had slow, delayed wound healing and we have been following her weekly for wound checks.  She is seen today via tele-visit in routine follow-up.  The patient and her spouse state the scabbed area over her incision has decreased in size.  They deny any drainage, fevers, chills, erythema or swelling.  Denies any episodes of the scab falling off.    The following portions of the patient's history were reviewed and updated as appropriate: allergies, current medications, past family history, past medical history, past social history, past surgical history and problem list.    Family history:   Family History   Problem Relation Age of Onset   • Arthritis Mother    • Hypertension Mother    • Stroke Mother    • Arthritis Father    • Cancer Father    • Lung cancer Father    • Throat cancer Father    • Heart disease Sister    • Pulmonary embolism Brother        Social history:   Social History     Socioeconomic History   • Marital status:      Spouse name: Not on file   • Number of children: Not on file   • Years of education: Not on file   • Highest education level: Not on file   Tobacco Use   • Smoking status: Former Smoker     Packs/day: 0.50     Years: 25.00     Pack years: 12.50     Types: Cigarettes     Last attempt to quit: 2019     Years since quittin.0   • Smokeless tobacco: Never Used   Substance and Sexual Activity   • Alcohol use: Yes     Comment: weekly- couple drinks    • Drug use: No   • Sexual activity: Defer       Review of Systems   Constitutional: Negative for activity change, appetite change, chills, diaphoresis, fatigue, fever and unexpected weight change.   Respiratory: Negative for shortness of breath.     Cardiovascular: Negative for chest pain.   Gastrointestinal: Negative for nausea and vomiting.   Neurological: Positive for speech difficulty and weakness.   All other systems reviewed and are negative.      Objective   not currently breastfeeding.  There is no height or weight on file to calculate BMI.  Patient's There is no height or weight on file to calculate BMI. BMI is within normal parameters. No follow-up required..      Physical Exam      Assessment/Plan       Ms. Goodwin is seen today via tele-visit for a wound check.  She and her spouse states the wound is continuing to heal.  They deny any episodes of the scab falling off.  Denies any drainage, fevers or chills.  Wound remains intact without evidence of dehiscence or infection.  I again reviewed the return to ED criteria with the patient instructed them to call if she experiences any fevers, drainage, or evidence of wound dehiscence.  I have asked him to call if they have any new or worsening symptoms.  We will follow-up in 2 weeks via tele-visit as the patient has transportation difficulties and lives far away, they do have the means to send a picture updates of the incision as seen above.    You have chosen to receive care through a telephone visit. Do you consent to use a telephone visit for your medical care today? Yes  I spent 5-10 minutes on phone with the patient.     Diagnoses and all orders for this visit:    S/P L frontal craniotomy and evacuation of intraparenchymal hematoma 1/30/20    Delayed surgical wound healing, subsequent encounter        Return in about 2 weeks (around 7/15/2020).             Kathy Del Real PA-C

## 2020-07-07 ENCOUNTER — TELEPHONE (OUTPATIENT)
Dept: NEUROSURGERY | Facility: CLINIC | Age: 50
End: 2020-07-07

## 2020-07-07 NOTE — TELEPHONE ENCOUNTER
----- Message from Yee Goodwin sent at 7/7/2020 12:45 PM EDT -----  Regarding: Visit Follow-Up Question  Contact: [phone]  [employer] sent a Medical Request Form to Dr. Cole concerning my condition and ability to return to work. [employer] has not received the filled-out form from DR. Cole. In addition, we presented the form in the office when we visited on June 19th.  This form must be filled out and sent in in order to continue receiving disability benefits. I am losing income every day that this goes undone. Please follow up on this for me.  Thank you,  Yee Goodwin.  pdf attached

## 2020-07-08 NOTE — TELEPHONE ENCOUNTER
I have called the patient and completed the form - faxed to [employer] and mailed her a copy.  ABBY Veras

## 2020-07-10 ENCOUNTER — HOSPITAL ENCOUNTER (OUTPATIENT)
Dept: MRI IMAGING | Facility: HOSPITAL | Age: 50
Discharge: HOME | End: 2020-07-10
Admitting: FAMILY MEDICINE

## 2020-07-10 ENCOUNTER — APPOINTMENT (OUTPATIENT)
Dept: MRI IMAGING | Facility: HOSPITAL | Age: 50
End: 2020-07-10

## 2020-07-10 DIAGNOSIS — M54.50 LOW BACK PAIN, UNSPECIFIED BACK PAIN LATERALITY, UNSPECIFIED CHRONICITY, UNSPECIFIED WHETHER SCIATICA PRESENT: ICD-10-CM

## 2020-07-10 PROCEDURE — 72148 MRI LUMBAR SPINE W/O DYE: CPT

## 2020-07-15 ENCOUNTER — TELEMEDICINE (OUTPATIENT)
Dept: NEUROSURGERY | Facility: CLINIC | Age: 50
End: 2020-07-15

## 2020-07-15 DIAGNOSIS — T81.89XD DELAYED SURGICAL WOUND HEALING, SUBSEQUENT ENCOUNTER: Primary | ICD-10-CM

## 2020-07-15 DIAGNOSIS — Z98.890 S/P CRANIOTOMY: ICD-10-CM

## 2020-07-15 PROCEDURE — 99441 PR PHYS/QHP TELEPHONE EVALUATION 5-10 MIN: CPT | Performed by: PHYSICIAN ASSISTANT

## 2020-07-15 NOTE — PROGRESS NOTES
Subjective     Chief Complaint: Wound check    Patient ID: Yee Goodwin is a 50 y.o. female is here today for follow-up.    History of Present Illness    This is a 50-year-old woman who underwent a frontal craniotomy for evacuation of a hematoma in 2020.  She has had delayed wound healing and we have been following her for biweekly wound checks.  She is seen today in routine follow-up via telemedicine.  She reports that the scab overlying her incision has fallen off.  She denies seeing any bone.  Denies any wound dehiscence, drainage, erythema or swelling.  Denies fevers or chills.    The following portions of the patient's history were reviewed and updated as appropriate: allergies, current medications, past family history, past medical history, past social history, past surgical history and problem list.    Family history:   Family History   Problem Relation Age of Onset   • Arthritis Mother    • Hypertension Mother    • Stroke Mother    • Arthritis Father    • Cancer Father    • Lung cancer Father    • Throat cancer Father    • Heart disease Sister    • Pulmonary embolism Brother        Social history:   Social History     Socioeconomic History   • Marital status:      Spouse name: Not on file   • Number of children: Not on file   • Years of education: Not on file   • Highest education level: Not on file   Tobacco Use   • Smoking status: Former Smoker     Packs/day: 0.50     Years: 25.00     Pack years: 12.50     Types: Cigarettes     Last attempt to quit: 2019     Years since quittin.1   • Smokeless tobacco: Never Used   Substance and Sexual Activity   • Alcohol use: Yes     Comment: weekly- couple drinks    • Drug use: No   • Sexual activity: Defer       Review of Systems   Constitutional: Negative for activity change, appetite change, chills, diaphoresis, fatigue, fever and unexpected weight change.   Respiratory: Negative for shortness of breath.    Cardiovascular: Negative for chest  pain.   Gastrointestinal: Negative for nausea and vomiting.   Skin: Negative for color change.   Neurological: Positive for speech difficulty and weakness.   All other systems reviewed and are negative.      Objective   not currently breastfeeding.  There is no height or weight on file to calculate BMI.  Patient's There is no height or weight on file to calculate BMI. BMI is within normal parameters. No follow-up required..      Physical Exam    Assessment/Plan     Ms. Goodwin is seen today in follow-up via tele-visit for a wound check.  Her scab overlying her incision has fallen off and wound edges are well approximated.  A picture of her incision has been reviewed and does not demonstrate any evidence of wound dehiscence.  Her wound appears to be continuing to heal.  I again reviewed the signs and symptoms of wound dehiscence and infection with the patient and her significant other and reviewed return to ED criteria.  We will follow-up with her in 2 weeks via telemedicine visit to again check on her wound, the patient lives far away and is difficult for them to get to the office.  They will call with any new or worsening symptoms in the meantime.    You have chosen to receive care through a telephone visit. Do you consent to use a telephone visit for your medical care today? Yes  I spent approximately 10 minutes on the phone with the patient.     Diagnoses and all orders for this visit:    Delayed surgical wound healing, subsequent encounter    S/P L frontal craniotomy and evacuation of intraparenchymal hematoma 1/30/20        Return in about 2 weeks (around 7/29/2020), or if symptoms worsen or fail to improve.             Kathy Del Real PA-C

## 2020-07-29 ENCOUNTER — OFFICE VISIT (OUTPATIENT)
Dept: NEUROSURGERY | Facility: CLINIC | Age: 50
End: 2020-07-29

## 2020-07-29 DIAGNOSIS — Z98.890 S/P CRANIOTOMY: Primary | ICD-10-CM

## 2020-07-29 PROCEDURE — 99441 PR PHYS/QHP TELEPHONE EVALUATION 5-10 MIN: CPT | Performed by: PHYSICIAN ASSISTANT

## 2020-07-29 NOTE — PROGRESS NOTES
Subjective     Chief Complaint: Wound check    Patient ID: Yee Goodwin is a 50 y.o. female is here today for follow-up.    History of Present Illness    This is a 50-year-old woman who underwent a frontal craniotomy for evacuation of hematoma in 2020.  The patient's had delayed wound healing and has been followed closely for wound checks.  She presents today in routine follow-up.  She reports her wound is well-healed.  She denies any scabbing, drainage, erythema, fluctuance, fever/chills or other constitutional symptoms    The following portions of the patient's history were reviewed and updated as appropriate: allergies, current medications, past family history, past medical history, past social history, past surgical history and problem list.    Family history:   Family History   Problem Relation Age of Onset   • Arthritis Mother    • Hypertension Mother    • Stroke Mother    • Arthritis Father    • Cancer Father    • Lung cancer Father    • Throat cancer Father    • Heart disease Sister    • Pulmonary embolism Brother        Social history:   Social History     Socioeconomic History   • Marital status:      Spouse name: Not on file   • Number of children: Not on file   • Years of education: Not on file   • Highest education level: Not on file   Tobacco Use   • Smoking status: Former Smoker     Packs/day: 0.50     Years: 25.00     Pack years: 12.50     Types: Cigarettes     Last attempt to quit: 2019     Years since quittin.1   • Smokeless tobacco: Never Used   Substance and Sexual Activity   • Alcohol use: Yes     Comment: weekly- couple drinks    • Drug use: No   • Sexual activity: Defer       Review of Systems   Constitutional: Negative for activity change, appetite change, chills, diaphoresis, fatigue, fever and unexpected weight change.   Respiratory: Negative for shortness of breath.    Cardiovascular: Negative for chest pain and leg swelling.   Skin: Negative for color change and  rash.   Neurological: Positive for speech difficulty and weakness. Negative for headaches.   All other systems reviewed and are negative.      Objective   not currently breastfeeding.  There is no height or weight on file to calculate BMI.  Patient's There is no height or weight on file to calculate BMI. BMI is within normal parameters. No follow-up required..      Physical Exam      Assessment/Plan       This is a 50-year-old woman who is seen today in routine follow-up via tele-visit for a wound check.  She is status post craniotomy for evacuation of hematoma in January 2020.  Her wound is fully healed upon review today.  She does not have any drainage, erythema or fever/chills.  No signs or symptoms of infection.  I reviewed signs and symptoms that would require an urgent follow-up with our office or ED visit and the patient and her spouse voiced understanding.      We will be happy to follow with him on an as-needed basis moving forward.  She continues to participate in therapy and is showing improvement in her speech and right lower extremity strength.  She is pleased with her progress thus far.    You have chosen to receive care through a telephone visit. Do you consent to use a telephone visit for your medical care today? Yes  I spent approximately 10 minutes on the phone with the patient.     Diagnoses and all orders for this visit:    S/P L frontal craniotomy and evacuation of intraparenchymal hematoma 1/30/20        Return if symptoms worsen or fail to improve.             Kathy Del Real PA-C

## 2020-09-08 ENCOUNTER — APPOINTMENT (OUTPATIENT)
Dept: SLEEP MEDICINE | Facility: HOSPITAL | Age: 50
End: 2020-09-08

## 2020-09-08 ENCOUNTER — APPOINTMENT (OUTPATIENT)
Dept: CT IMAGING | Facility: HOSPITAL | Age: 50
End: 2020-09-08

## 2020-09-08 ENCOUNTER — APPOINTMENT (OUTPATIENT)
Dept: MRI IMAGING | Facility: HOSPITAL | Age: 50
End: 2020-09-08

## 2020-09-08 ENCOUNTER — HOSPITAL ENCOUNTER (OUTPATIENT)
Facility: HOSPITAL | Age: 50
Setting detail: OBSERVATION
Discharge: HOME | End: 2020-09-09
Attending: EMERGENCY MEDICINE | Admitting: INTERNAL MEDICINE

## 2020-09-08 DIAGNOSIS — Z78.9 IMPAIRED MOBILITY AND ADLS: ICD-10-CM

## 2020-09-08 DIAGNOSIS — Z74.09 IMPAIRED MOBILITY AND ADLS: ICD-10-CM

## 2020-09-08 DIAGNOSIS — G45.9 TIA (TRANSIENT ISCHEMIC ATTACK): Primary | ICD-10-CM

## 2020-09-08 DIAGNOSIS — R47.01 APHASIA: ICD-10-CM

## 2020-09-08 PROBLEM — I69.151 HEMIPARESIS OF RIGHT DOMINANT SIDE AS LATE EFFECT OF NONTRAUMATIC INTRACEREBRAL HEMORRHAGE: Status: ACTIVE | Noted: 2020-09-08

## 2020-09-08 LAB
ALBUMIN SERPL-MCNC: 4.6 G/DL (ref 3.5–5.2)
ALBUMIN/GLOB SERPL: 1.4 G/DL
ALP SERPL-CCNC: 137 U/L (ref 39–117)
ALT SERPL W P-5'-P-CCNC: 28 U/L (ref 1–33)
ANION GAP SERPL CALCULATED.3IONS-SCNC: 11 MMOL/L (ref 5–15)
APTT PPP: 23.3 SECONDS (ref 24.5–37.2)
AST SERPL-CCNC: 22 U/L (ref 1–32)
BASOPHILS # BLD AUTO: 0.11 10*3/MM3 (ref 0–0.2)
BASOPHILS NFR BLD AUTO: 1.4 % (ref 0–1.5)
BILIRUB SERPL-MCNC: 0.4 MG/DL (ref 0–1.2)
BUN SERPL-MCNC: 15 MG/DL (ref 6–20)
BUN/CREAT SERPL: 27.3 (ref 7–25)
CALCIUM SPEC-SCNC: 10.2 MG/DL (ref 8.6–10.5)
CHLORIDE SERPL-SCNC: 103 MMOL/L (ref 98–107)
CO2 SERPL-SCNC: 24 MMOL/L (ref 22–29)
CREAT SERPL-MCNC: 0.55 MG/DL (ref 0.57–1)
DEPRECATED RDW RBC AUTO: 46.6 FL (ref 37–54)
EOSINOPHIL # BLD AUTO: 0.4 10*3/MM3 (ref 0–0.4)
EOSINOPHIL NFR BLD AUTO: 5.1 % (ref 0.3–6.2)
ERYTHROCYTE [DISTWIDTH] IN BLOOD BY AUTOMATED COUNT: 14.6 % (ref 12.3–15.4)
GFR SERPL CREATININE-BSD FRML MDRD: 116 ML/MIN/1.73
GLOBULIN UR ELPH-MCNC: 3.2 GM/DL
GLUCOSE BLDC GLUCOMTR-MCNC: 106 MG/DL (ref 70–130)
GLUCOSE SERPL-MCNC: 110 MG/DL (ref 65–99)
HCT VFR BLD AUTO: 40.7 % (ref 34–46.6)
HGB BLD-MCNC: 13.7 G/DL (ref 12–15.9)
IMM GRANULOCYTES # BLD AUTO: 0.03 10*3/MM3 (ref 0–0.05)
IMM GRANULOCYTES NFR BLD AUTO: 0.4 % (ref 0–0.5)
INR PPP: 0.9 (ref 0.9–1.1)
LYMPHOCYTES # BLD AUTO: 2.18 10*3/MM3 (ref 0.7–3.1)
LYMPHOCYTES NFR BLD AUTO: 27.7 % (ref 19.6–45.3)
MCH RBC QN AUTO: 29.2 PG (ref 26.6–33)
MCHC RBC AUTO-ENTMCNC: 33.7 G/DL (ref 31.5–35.7)
MCV RBC AUTO: 86.8 FL (ref 79–97)
MONOCYTES # BLD AUTO: 0.52 10*3/MM3 (ref 0.1–0.9)
MONOCYTES NFR BLD AUTO: 6.6 % (ref 5–12)
NEUTROPHILS NFR BLD AUTO: 4.64 10*3/MM3 (ref 1.7–7)
NEUTROPHILS NFR BLD AUTO: 58.8 % (ref 42.7–76)
NRBC BLD AUTO-RTO: 0 /100 WBC (ref 0–0.2)
PLATELET # BLD AUTO: 320 10*3/MM3 (ref 140–450)
PMV BLD AUTO: 8.9 FL (ref 6–12)
POTASSIUM SERPL-SCNC: 4.2 MMOL/L (ref 3.5–5.2)
PROT SERPL-MCNC: 7.8 G/DL (ref 6–8.5)
PROTHROMBIN TIME: 12.5 SECONDS (ref 12–15.1)
RBC # BLD AUTO: 4.69 10*6/MM3 (ref 3.77–5.28)
SODIUM SERPL-SCNC: 138 MMOL/L (ref 136–145)
TROPONIN T SERPL-MCNC: <0.01 NG/ML (ref 0–0.03)
WBC # BLD AUTO: 7.88 10*3/MM3 (ref 3.4–10.8)

## 2020-09-08 PROCEDURE — G0378 HOSPITAL OBSERVATION PER HR: HCPCS

## 2020-09-08 PROCEDURE — 63710000001 DIPHENHYDRAMINE PER 50 MG: Performed by: INTERNAL MEDICINE

## 2020-09-08 PROCEDURE — 25010000002 IOPAMIDOL 61 % SOLUTION: Performed by: EMERGENCY MEDICINE

## 2020-09-08 PROCEDURE — 84484 ASSAY OF TROPONIN QUANT: CPT | Performed by: EMERGENCY MEDICINE

## 2020-09-08 PROCEDURE — 70450 CT HEAD/BRAIN W/O DYE: CPT

## 2020-09-08 PROCEDURE — 99284 EMERGENCY DEPT VISIT MOD MDM: CPT

## 2020-09-08 PROCEDURE — 93005 ELECTROCARDIOGRAM TRACING: CPT | Performed by: EMERGENCY MEDICINE

## 2020-09-08 PROCEDURE — 82962 GLUCOSE BLOOD TEST: CPT

## 2020-09-08 PROCEDURE — 99220 PR INITIAL OBSERVATION CARE/DAY 70 MINUTES: CPT | Performed by: INTERNAL MEDICINE

## 2020-09-08 PROCEDURE — 95816 EEG AWAKE AND DROWSY: CPT

## 2020-09-08 PROCEDURE — 85730 THROMBOPLASTIN TIME PARTIAL: CPT | Performed by: EMERGENCY MEDICINE

## 2020-09-08 PROCEDURE — 85610 PROTHROMBIN TIME: CPT | Performed by: EMERGENCY MEDICINE

## 2020-09-08 PROCEDURE — 70551 MRI BRAIN STEM W/O DYE: CPT

## 2020-09-08 PROCEDURE — 85025 COMPLETE CBC W/AUTO DIFF WBC: CPT | Performed by: EMERGENCY MEDICINE

## 2020-09-08 PROCEDURE — 80053 COMPREHEN METABOLIC PANEL: CPT | Performed by: EMERGENCY MEDICINE

## 2020-09-08 PROCEDURE — 99205 OFFICE O/P NEW HI 60 MIN: CPT | Performed by: STUDENT IN AN ORGANIZED HEALTH CARE EDUCATION/TRAINING PROGRAM

## 2020-09-08 PROCEDURE — 70498 CT ANGIOGRAPHY NECK: CPT

## 2020-09-08 PROCEDURE — 70496 CT ANGIOGRAPHY HEAD: CPT

## 2020-09-08 RX ORDER — PANTOPRAZOLE SODIUM 40 MG/1
40 TABLET, DELAYED RELEASE ORAL DAILY
Status: DISCONTINUED | OUTPATIENT
Start: 2020-09-09 | End: 2020-09-09 | Stop reason: HOSPADM

## 2020-09-08 RX ORDER — DIPHENHYDRAMINE HCL 25 MG
25 CAPSULE ORAL NIGHTLY PRN
Status: DISCONTINUED | OUTPATIENT
Start: 2020-09-08 | End: 2020-09-09 | Stop reason: HOSPADM

## 2020-09-08 RX ORDER — SODIUM CHLORIDE 0.9 % (FLUSH) 0.9 %
10 SYRINGE (ML) INJECTION EVERY 12 HOURS SCHEDULED
Status: DISCONTINUED | OUTPATIENT
Start: 2020-09-08 | End: 2020-09-09 | Stop reason: HOSPADM

## 2020-09-08 RX ORDER — HYDRALAZINE HYDROCHLORIDE 20 MG/ML
10 INJECTION INTRAMUSCULAR; INTRAVENOUS EVERY 4 HOURS PRN
Status: DISCONTINUED | OUTPATIENT
Start: 2020-09-08 | End: 2020-09-09 | Stop reason: HOSPADM

## 2020-09-08 RX ORDER — ONDANSETRON 2 MG/ML
4 INJECTION INTRAMUSCULAR; INTRAVENOUS EVERY 6 HOURS PRN
Status: DISCONTINUED | OUTPATIENT
Start: 2020-09-08 | End: 2020-09-09 | Stop reason: HOSPADM

## 2020-09-08 RX ORDER — CARVEDILOL 12.5 MG/1
12.5 TABLET ORAL 2 TIMES DAILY WITH MEALS
Status: DISCONTINUED | OUTPATIENT
Start: 2020-09-08 | End: 2020-09-09 | Stop reason: HOSPADM

## 2020-09-08 RX ORDER — ACETAMINOPHEN 325 MG/1
650 TABLET ORAL EVERY 4 HOURS PRN
Status: DISCONTINUED | OUTPATIENT
Start: 2020-09-08 | End: 2020-09-09 | Stop reason: HOSPADM

## 2020-09-08 RX ORDER — GABAPENTIN 300 MG/1
300 CAPSULE ORAL 3 TIMES DAILY
Status: DISCONTINUED | OUTPATIENT
Start: 2020-09-08 | End: 2020-09-09 | Stop reason: HOSPADM

## 2020-09-08 RX ORDER — SODIUM CHLORIDE 0.9 % (FLUSH) 0.9 %
10 SYRINGE (ML) INJECTION AS NEEDED
Status: DISCONTINUED | OUTPATIENT
Start: 2020-09-08 | End: 2020-09-09 | Stop reason: HOSPADM

## 2020-09-08 RX ORDER — ACETAMINOPHEN 650 MG/1
650 SUPPOSITORY RECTAL EVERY 4 HOURS PRN
Status: DISCONTINUED | OUTPATIENT
Start: 2020-09-08 | End: 2020-09-09 | Stop reason: HOSPADM

## 2020-09-08 RX ORDER — ACETAMINOPHEN 160 MG/5ML
650 SOLUTION ORAL EVERY 4 HOURS PRN
Status: DISCONTINUED | OUTPATIENT
Start: 2020-09-08 | End: 2020-09-09 | Stop reason: HOSPADM

## 2020-09-08 RX ORDER — GABAPENTIN 400 MG/1
400 CAPSULE ORAL DAILY
COMMUNITY

## 2020-09-08 RX ADMIN — IOPAMIDOL 75 ML: 612 INJECTION, SOLUTION INTRAVENOUS at 13:26

## 2020-09-08 RX ADMIN — GABAPENTIN 300 MG: 300 CAPSULE ORAL at 21:57

## 2020-09-08 RX ADMIN — CARVEDILOL 12.5 MG: 12.5 TABLET, FILM COATED ORAL at 18:41

## 2020-09-08 RX ADMIN — SODIUM CHLORIDE, PRESERVATIVE FREE 10 ML: 5 INJECTION INTRAVENOUS at 23:07

## 2020-09-08 RX ADMIN — DIPHENHYDRAMINE HYDROCHLORIDE 25 MG: 25 CAPSULE ORAL at 23:06

## 2020-09-08 NOTE — H&P
[facility]   HISTORY AND PHYSICAL      Name:  Yee Goodwin   Age:  50 y.o.  Sex:  female  :  1970  MRN:  [id number]   Visit Number:  [id number]  Admission Date:  2020  Date Of Service:  20  Primary Care Physician:  Lula Marlow DO    Chief Complaint:     Left facial droop and slurred speech.    History Of Presenting Illness:      This is a 50-year-old female with history of hypertension, hyperlipidemia, obstructive sleep apnea, who had left frontal lobe intracranial hemorrhage in 2020 needing admission in the ICU at [facility] with left frontal craniotomy, residual spastic right hemiparesis was brought to the emergency room by her  with right facial droop and slurred speech lasting for about 15 minutes.  When the patient arrived in the emergency room, she was back to her baseline.  The history is obtained from the patient as well as her .  Patient apparently developed drooping of the right side of the face associated with some slurred speech prior to arrival that lasted for about 15 minutes.  There was no tonic-clonic seizures.  No left-sided weakness.  Patient does have history of right hemiparesis but apparently walks with a walker at home.  Her right upper extremity however is extremely weak with contractures.    In the emergency room, she was afebrile and hemodynamically stable except for initial blood pressure of 170/100.  Her pulse oxygen saturation was 94% on room air.  Blood work including troponin, CMP, CBC, PT/INR were unremarkable.  CT of the head without contrast was negative for any acute intracranial abnormalities.  CT angiogram of the head and neck was negative for any hemodynamically significant stenosis.  A call was placed to Dr. Dwyer from the telemetry neurology service who evaluated the patient and felt that the patient may have had seizure episode secondary to the previous brain injury and  scarring.  He recommended MRI and EEG.    Patient did have an MRI done in the emergency room.  It showed encephalomalacia in the left frontal lobe from her previous ICH.  It did not show any acute intracranial abnormalities.  Patient is currently being admitted to the medical floor with telemetry for observation and further evaluation.    Review Of Systems:     General ROS: Patient denies any fevers, chills or loss of consciousness. Complains of generalized weakness.  Psychological ROS: No history of any hallucinations and delusions.  Ophthalmic ROS: No history of any diplopia or transient loss of vision.  ENT ROS: No history of sore throat, nasal congestion or ear pain.   Allergy and Immunology ROS: No history of rash or itching.  Hematological and Lymphatic ROS: No history of neck swelling or easy bleeding.  Endocrine ROS: No history of any recent unintentional weight gain or loss.  Respiratory ROS: No history of cough or shortness of breath.  Cardiovascular ROS: No history of chest pain or palpitations.  Gastrointestinal ROS: No history of nausea and vomiting. Denies any abdominal pain.  Genito-Urinary ROS: No history of dysuria or hematuria.  Musculoskeletal ROS: No muscle pain. No calf pain.  Neurological ROS: As per history of presenting illness.  Dermatological ROS: No history of any redness or pruritis.     Past Medical History:    Past Medical History:   Diagnosis Date   • Arthritis    • Diverticulitis    • GERD (gastroesophageal reflux disease)    • Hypertension    • Stroke (CMS/HCC)      Past Surgical history:    Past Surgical History:   Procedure Laterality Date   • CEREBRAL ANGIOGRAM N/A 1/29/2020    Procedure: Cerebral angiogram;  Surgeon: Merlin Black MD;  Location: [facility] INVASIVE LOCATION;  Service: Interventional Radiology   • CHOLECYSTECTOMY     • COLONOSCOPY  2015   • CRANIOTOMY FOR SUBDURAL HEMATOMA Left 1/30/2020    Procedure: FRONTAL CRANIOTOMY FOR EVACUATION OF INTRAPARENCHYMAL  HEMATOMA  LEFT;  Surgeon: Jose Cole MD;  Location: [facility] OR;  Service: Neurosurgery;  Laterality: Left;   • ENDOSCOPY W/ PEG TUBE PLACEMENT N/A 2/3/2020    Procedure: ESOPHAGOGASTRODUODENOSCOPY WITH PERCUTANEOUS ENDOSCOPIC GASTROSTOMY TUBE INSERTION AT BEDSIDE;  Surgeon: Andrew Young MD;  Location:  [facility];  Service: General;  Laterality: N/A;   • HAND TENDON SURGERY Right     patient hand right thumb tendon surgery   • HIP SURGERY     • JOINT REPLACEMENT     • TUBAL ABDOMINAL LIGATION       Social History:    Social History     Socioeconomic History   • Marital status:      Spouse name: Not on file   • Number of children: Not on file   • Years of education: Not on file   • Highest education level: Not on file   Tobacco Use   • Smoking status: Former Smoker     Packs/day: 0.50     Years: 25.00     Pack years: 12.50     Types: Cigarettes     Last attempt to quit: 2019     Years since quittin.2   • Smokeless tobacco: Never Used   Substance and Sexual Activity   • Alcohol use: Yes     Comment: weekly- couple drinks    • Drug use: No   • Sexual activity: Defer     Family History:    Family History   Problem Relation Age of Onset   • Arthritis Mother    • Hypertension Mother    • Stroke Mother    • Arthritis Father    • Cancer Father    • Lung cancer Father    • Throat cancer Father    • Heart disease Sister    • Pulmonary embolism Brother      Allergies:      Venlafaxine    Home Medications:    Prior to Admission Medications     Prescriptions Last Dose Informant Patient Reported? Taking?    gabapentin (NEURONTIN) 300 MG capsule   Yes Yes    Take 300 mg by mouth 3 (Three) Times a Day.    acetaminophen (TYLENOL) 325 MG tablet   No No    Take 2 tablets by mouth Every 6 (Six) Hours As Needed for Mild Pain .    carvedilol (COREG) 12.5 MG tablet   Yes No    Take 12.5 mg by mouth 2 (Two) Times a Day With Meals.    omeprazole (priLOSEC) 20 MG capsule   Yes No    Take 20 mg by mouth  Daily.    pantoprazole (PROTONIX) 20 MG EC tablet   Yes No    Take 20 mg by mouth Daily.           ED Medications:    Medications   iopamidol (ISOVUE-300) 61 % injection 100 mL (75 mL Intravenous Given 9/8/20 1326)     Vital Signs:    Temp:  [98.5 °F (36.9 °C)] 98.5 °F (36.9 °C)  Heart Rate:  [79-86] 80  Resp:  [16-18] 17  BP: (128-170)/() 134/89        09/08/20  1301   Weight: 72.6 kg (160 lb)     Body mass index is 26.63 kg/m².    Physical Exam:    General Appearance:  Alert and cooperative, not in any acute distress.   Head:  Atraumatic and normocephalic, minimal right upper motor neuron type of palsy noted..   Eyes:          PERRLA, conjunctivae and sclerae normal, no Icterus. No pallor. Extraocular movements are within normal limits.   Ears:  Ears appear intact with no abnormalities noted.   Throat: No oral lesions, no thrush, oral mucosa moist.   Neck: Supple, trachea midline, no thyromegaly, no carotid bruit.   Back:   No kyphoscoliosis present. No tenderness to palpation,   range of motion normal.   Lungs:   Chest shape is normal. Breath sounds heard bilaterally equally.  No crackles or wheezing. No Pleural rub or bronchial breathing.   Heart:  Normal S1 and S2, no murmur, no gallop, no rub. No JVD.   Abdomen:   Normal bowel sounds, no masses, no organomegaly. Soft, nontender, nondistended, no guarding, no rebound tenderness.   Extremities: Moves all extremities, no edema, no cyanosis, no clubbing.   Pulses: Pulses palpable and equal bilaterally.   Skin: No bleeding, bruising or rash.   Neurologic: Alert and oriented x 3.  Does have normal power in the left upper and lower extremities.  Her right upper extremity has 2+ power on the shoulder area but no power in the elbow and wrist area.  She has spasticity on the right upper extremity. No tremors.     Laboratory data:    I have reviewed the labs done in the emergency room.    Results from last 7 days   Lab Units 09/08/20  1322   SODIUM mmol/L 138    POTASSIUM mmol/L 4.2   CHLORIDE mmol/L 103   CO2 mmol/L 24.0   BUN mg/dL 15   CREATININE mg/dL 0.55*   CALCIUM mg/dL 10.2   BILIRUBIN mg/dL 0.4   ALK PHOS U/L 137*   ALT (SGPT) U/L 28   AST (SGOT) U/L 22   GLUCOSE mg/dL 110*     Results from last 7 days   Lab Units 09/08/20  1322   WBC 10*3/mm3 7.88   HEMOGLOBIN g/dL 13.7   HEMATOCRIT % 40.7   PLATELETS 10*3/mm3 320     Results from last 7 days   Lab Units 09/08/20  1322   INR  0.90     Results from last 7 days   Lab Units 09/08/20  1322   TROPONIN T ng/mL <0.010     EKG:      EKG done in the emergency room was reviewed by me.  It shows normal sinus rhythm at 80 bpm.  Normal axis.  No significant ST-T changes were noted.    Radiology:    Imaging Results (Last 72 Hours)     Procedure Component Value Units Date/Time    MRI Brain Without Contrast [[id number]] Collected:  09/08/20 1525     Updated:  09/08/20 1528    Narrative:       PROCEDURE: MRI BRAIN WO CONTRAST-     HISTORY: CVA?; G45.9-Transient cerebral ischemic attack, unspecified;  R47.01-Aphasia     PROCEDURE: Multiplanar multisequence imaging of the brain was performed  without the use of intravenous contrast.     COMPARISON: None.     FINDINGS: There is encephalomalacia in the left frontal lobe. There is  no mass, mass effect or midline shift. There is no hydrocephalus. There  are no areas of restricted diffusion.     The midbrain, linda, cerebellum and craniocervical junction are  unremarkable. The sella and pituitary gland are within normal limits.  The major intracranial vasculature demonstrates the expected flow  related signal. The paranasal sinuses are clear.       Impression:       Encephalomalacia in the left frontal lobe with no acute  intracranial abnormality.           This report was finalized on 9/8/2020 3:26 PM by Malka Wood M.D..    CT Angiogram Neck [[id number]] Collected:  09/08/20 1347     Updated:  09/08/20 1349    Narrative:       PROCEDURE: CT ANGIOGRAM NECK-     HISTORY:  weakness, facial droop aphasia     TECHNIQUE: Axial images were obtained from the skull vertex through the  upper chest following the administration of Isovue-300 per the CTA  protocol. Coronal and sagittal 3-D MIP images were reformatted.      FINDINGS:     CTA:      Aortic arch:  The branch vessels from the thoracic aorta are widely  patent. Incidental note is made of direct origin of the left vertebral  artery from the aortic arch.     Right carotid:  No calcified or soft plaque is seen in the right common  carotid artery. There is mild calcific and soft plaque seen in the right  carotid bulb producing an approximately 20% stenosis. There is mild  calcific plaque at the origin of the right internal carotid artery  producing an approximately 30 stenosis. No plaque is seen in the mid or  distal internal carotid artery.     Left carotid:  No plaque is seen in the left common carotid artery. Mild  calcific and soft plaque is seen in the left carotid bulb without a  significant stenosis present. There is calcified and soft plaque in the  left internal carotid artery at its origin producing an approximately  50% stenosis. No calcified or soft plaque is seen within the mid or  distal internal carotid artery     Vertebrals: No significant stenosis is present.     The constituent vessels of the [place] are patent with no areas  of significant stenosis or abrupt cut off. There is no evidence of an  aneurysm or vascular malformation.       Impression:       1. Plaque within both carotid bulbs and the proximal ICAs without a  hemodynamically significant stenosis identified.  2. Unremarkable CT angiogram of the brain.        This study was performed with techniques to keep radiation doses as low  as reasonably achievable (ALARA). Individualized dose reduction  techniques using automated exposure control or adjustment of mA and/or  kV according to the patient size were employed.         This report was finalized on  9/8/2020 1:47 PM by Malka Wodo M.D..    CT Angiogram Head [[id number]] Collected:  09/08/20 1340     Updated:  09/08/20 1349    Narrative:       PROCEDURE: CT ANGIOGRAM HEAD-     HISTORY: weakness, facial droop aphasia     TECHNIQUE: Axial images were obtained from the skull vertex through the  upper chest following the administration of Isovue-300 per the CTA  protocol. Coronal and sagittal 3-D MIP images were reformatted.      FINDINGS:     CTA:      Aortic arch:  The branch vessels from the thoracic aorta are widely  patent. Incidental note is made of direct origin of the left vertebral  artery from the aortic arch.     Right carotid:  No calcified or soft plaque is seen in the right common  carotid artery. There is mild calcific and soft plaque seen in the right  carotid bulb producing an approximately 20% stenosis. There is mild  calcific plaque at the origin of the right internal carotid artery  producing an approximately 30 stenosis. No plaque is seen in the mid or  distal internal carotid artery.     Left carotid:  No plaque is seen in the left common carotid artery. Mild  calcific and soft plaque is seen in the left carotid bulb without a  significant stenosis present. There is calcified and soft plaque in the  left internal carotid artery at its origin producing an approximately  50% stenosis. No calcified or soft plaque is seen within the mid or  distal internal carotid artery     Vertebrals: No significant stenosis is present.     The constituent vessels of the [place] are patent with no areas  of significant stenosis or abrupt cut off. There is no evidence of an  aneurysm or vascular malformation.       Impression:       1. Plaque within both carotid bulbs and the proximal ICAs without a  hemodynamically significant stenosis identified.  2. Unremarkable CT angiogram of the brain.        This study was performed with techniques to keep radiation doses as low  as reasonably achievable  (ALARA). Individualized dose reduction  techniques using automated exposure control or adjustment of mA and/or  kV according to the patient size were employed.      This report was finalized on 9/8/2020 1:46 PM by Malka Wood M.D..    CT Head Without Contrast Stroke Protocol [[id number]] Collected:  09/08/20 1330     Updated:  09/08/20 1338    Narrative:       PROCEDURE: CT HEAD WO CONTRAST STROKE PROTOCOL-     HISTORY: aphasia, weakness     COMPARISON:  None .     TECHNIQUE: Multiple axial CT images were performed from the foramen  magnum to the vertex without enhancement.      FINDINGS: There is an area of encephalomalacia in the left frontal lobe  underlying a craniotomy site. There is no CT evidence of acute  hemorrhage. There is no mass, mass effect or midline shift.  There is no  hydrocephalus. The paranasal sinuses are clear. Bone windows reveal no  acute osseous abnormalities.       Impression:       No acute intracranial process.           704.44 mGy.cm        This study was performed with techniques to keep radiation doses as low  as reasonably achievable (ALARA). Individualized dose reduction  techniques using automated exposure control or adjustment of mA and/or  kV according to the patient size were employed.      This report was finalized on 9/8/2020 1:31 PM by Malka Wood M.D..        Assessment:    1.  Transient ischemic attack versus seizure episode, new onset, present on admission.  2.  History of left frontal intracranial hemorrhage in February 2020 status post craniotomy.  3.  Residual right spastic hemiparesis secondary to #2.  4.  Essential hypertension, uncontrolled, present on admission.  5.  Gastroesophageal reflux disease.    Plan:    Ms. Goodwin is currently being admitted to the medical floor with telemetry for observation.  She has already been evaluated by telemetry neurologist.  MRI does not show any acute stroke.  We will get EEG.  Her home medications will be continued.   She will be placed on hydralazine as needed for elevated blood pressures.  We will consult physical and occupational therapy.    The exact etiology of the patient's symptom is uncertain but it could be TIA versus seizures.  She is not taking low-dose aspirin at home.  Depending upon the EEG results, we will follow-up with neurologist regarding low-dose aspirin versus antiseizure medications.  I have discussed advanced directives with the patient and she wants to be full code.  Discussed the patient's condition and treatment plan with her  who is at the bedside.    Advance Care Planning      ACP discussion was held with the patient during this visit. Patient does not have an advance directive, information provided.      Otis Díaz MD  09/08/20  15:53    Dictated utilizing Dragon dictation.

## 2020-09-08 NOTE — CONSULTS
Stroke Consult Note    Patient Name: Yee Goodwin   MRN: [id number]  Age: 50 y.o.  Sex: female  : 1970    Primary Care Physician: Lula Marlow DO  Referring Physician:  No ref. provider found    TIME STROKE TEAM CALLED:  2020 2:00 PM  EST     TIME PATIENT SEEN:  2:30 PM  EST    Handedness: R   Race: W     Chief Complaint/Reason for Consultation: right facial droop     Subjective .  HPI:   This is 50-year-old with history of left frontal intracerebral ICH with intraventricular extension in January, etiology secondary to hypertension with residual spastic right hemiparesis presented with acute onset of right facial droop and a stuttering speech lasting for 15 minutes.  Patient was back to baseline in the emergency department.    Patient is not on any antithrombotics.    Last Known Normal Date/Time: 2040 1230 EST     Review of Systems   Constitutional: No fatigue  HENT: Negative for nosebleeds and rhinorrhea.    Eyes: Negative for redness.   Respiratory: Negative for cough.    Gastrointestinal: Negative for anal bleeding.   Endocrine: Negative for polydipsia.   Genitourinary: Negative for enuresis and urgency.   Musculoskeletal: Negative for joint swelling.   Neurological: Negative for tremors.   Psychiatric/Behavioral: Negative for hallucinations.     Temp:  [98.5 °F (36.9 °C)] 98.5 °F (36.9 °C)  Heart Rate:  [84-86] 86  Resp:  [16-18] 18  BP: (149-170)/(100-104) 149/100    GEN: NAD, pleasant, cooperative  Eyes-show anicteric sclera, moist conjunctiva with no lid lag, no redness  Neck-trachea midline.  There is no thyromegaly.  ENMT-oropharynx clear with moist mucous membranes and good dentition.  Skin-no rash, lesions or ulcers.  Cardiovascular exam-no pedal edema, regular rate and rhythm.  CHEST: No signs of resp distress, on room air  Abdomen-no abdominal distention, nontender.  Psychiatric exam-alert oriented x3 with intact judgment and insight      NEURO    MENTAL STATUS: AAOx3, memory  intact, fund of knowledge appropriate    LANG/SPEECH: Naming and repetition intact, fluent, follows 3-step commands    CRANIAL NERVES:      II: Pupils equal and reactive, no RAPD, no VF deficits, fundus(not done)      III, IV, VI: EOM intact, no gaze preference or deviation, no nystagmus.      V: normal sensation in V1, V2, and V3 segments bilaterally      VII: no asymmetry, no nasolabial fold flattening      VIII: normal hearing to speech      IX, X: normal palatal elevation, no uvular deviation      XI: 5/5 head turn and 5/5 shoulder shrug bilaterally      XII: midline tongue protrusion    MOTOR:  Increased tone on the right upper and lower  0/5 muscle power in Rt shoulder abductors/adductors, elbow flexors/extensors, wrist flexors/extensors, finger abductors/adductors.  4/5 in Rt hip flexors/extensors, knee flexors/extensors, ankle dorsiflexors and plantar flexors.    5/5 muscle power in Lt shoulder abductors/adductors, elbow flexors/extensors, wrist flexors/extensors, finger abductors/adductors.  5/5 in Lt hip flexors/extensors, knee flexors/extensors, ankle dorsiflexors and plantea flexors.    REFLEXES:  no Espitia's, no clonus    SENSORY:    Decreased on the right side ( baseline)    COORDINATION: Normal finger to nose and heel to shin, no tremor, no dysmetria    STATION: Not assessed due to patient condition    GAIT: Not assessed due to patient condition  Acute Stroke Data    Alteplase (tPA) Inclusion / Exclusion Criteria    Time: 14:28  Person Administering Scale: Guru Dwyer MD    Inclusion Criteria  [x]   18 years of age or greater   []   Onset of symptoms < 4.5 hours before beginning treatment (stroke onset = time patient was last seen well or without symptoms).   []   Diagnosis of acute ischemic stroke causing measurable disabling deficit (Complete Hemianopia, Any Aphasia, Visual or Sensory Extinction, Any weakness limiting sustained effort against gravity)   []   Any remaining deficit considered  potentially disabling in view of patient and practitioner   Exclusion criteria (Do not proceed with Alteplase if any are checked under exclusion criteria)  []   Onset unknown or GREATER than 4.5 hours   []   ICH on CT/MRI   []   CT demonstrates hypodensity representing acute or subacute infarct   []   Significant head trauma or prior stroke in the previous 3 months   []   Symptoms suggestive of subarachnoid hemorrhage   []   History of un-ruptured intracranial aneurysm GREATER than 10 mm   []   Recent intracranial or intraspinal surgery within the last 3 months   []   Arterial puncture at a non-compressible site in the previous 7 days   []   Active internal bleeding   []   Acute bleeding tendency   []   Platelet count LESS than 100,000 for known hematological diseases such as leukemia, thrombocytopenia or chronic cirrhosis   []   Current use of anticoagulant with INR GREATER than 1.7 or PT GREATER than 15 seconds, aPTT GREATER than 40 seconds   []   Heparin received within 48 hours, resulting in abnormally elevated aPTT GREATER than upper limit of normal   []   Current use of direct thrombin inhibitors or direct factor Xa inhibitors in the past 48 hours   []   Elevated blood pressure refractory to treatment (systolic GREATER than 185 mm/Hg or diastolic  GREATER than 110 mm/Hg   []   Suspected infective endocarditis and aortic arch dissection   []   Current use of therapeutic treatment dose of low-molecular-weight heparin (LMWH) within the previous 24 hours   []   Structural GI malignancy or bleed   Relative exclusion for all patients  [x]   Only minor non-disabling symptoms   []   Pregnancy   []   Seizure at onset with postictal residual neurological impairments   []   Major surgery or previous trauma within past 14 days   []   History of previous spontaneous ICH, intracranial neoplasm, or AV malformation   []   Postpartum (within previous 14 days)   []   Recent GI or urinary tract hemorrhage (within previous 21  days)   []   Recent acute MI (within previous 3 months)   []   History of un-ruptured intracranial aneurysm LESS than 10 mm   []   History of ruptured intracranial aneurysm   []   Blood glucose LESS than 50 mg/dL (2.7 mmol/L)   []   Dural puncture within the last 7 days   []   Known GREATER than 10 cerebral microbleeds   Additional exclusions for patients with symptoms onset between 3 and 4.5 hours.  []   Age > 80.   []   On any anticoagulants regardless of INR  >>> Warfarin (Coumadin), Heparin, Enoxaparin (Lovenox), fondaparinux (Arixtra), bivalirudin (Angiomax), Argatroban, dabigatran (Pradaxa), rivaroxaban (Xarelto), or apixaban (Eliquis)   []   Severe stroke (NIHSS > 25).   []   History of BOTH diabetes and previous ischemic stroke.   []   The risks and benefits have been discussed with the patient or family related to the administration of IV Alteplase for stroke symptoms.   []   I have discussed and reviewed the patient's case and imaging with the attending prior to IV Alteplase.    Time Alteplase administered       Past Medical History:   Diagnosis Date   • Arthritis    • Diverticulitis    • GERD (gastroesophageal reflux disease)    • Hypertension    • Stroke (CMS/HCC)      Past Surgical History:   Procedure Laterality Date   • CEREBRAL ANGIOGRAM N/A 1/29/2020    Procedure: Cerebral angiogram;  Surgeon: Merlin Black MD;  Location:  [facility] CATH INVASIVE LOCATION;  Service: Interventional Radiology   • CHOLECYSTECTOMY     • COLONOSCOPY  2015   • CRANIOTOMY FOR SUBDURAL HEMATOMA Left 1/30/2020    Procedure: FRONTAL CRANIOTOMY FOR EVACUATION OF INTRAPARENCHYMAL HEMATOMA  LEFT;  Surgeon: Jose Cole MD;  Location:  [facility] OR;  Service: Neurosurgery;  Laterality: Left;   • ENDOSCOPY W/ PEG TUBE PLACEMENT N/A 2/3/2020    Procedure: ESOPHAGOGASTRODUODENOSCOPY WITH PERCUTANEOUS ENDOSCOPIC GASTROSTOMY TUBE INSERTION AT BEDSIDE;  Surgeon: Andrew Young MD;  Location:  [facility] ENDOSCOPY;  Service:  General;  Laterality: N/A;   • HAND TENDON SURGERY Right     patient hand right thumb tendon surgery   • HIP SURGERY     • JOINT REPLACEMENT     • TUBAL ABDOMINAL LIGATION       Family History   Problem Relation Age of Onset   • Arthritis Mother    • Hypertension Mother    • Stroke Mother    • Arthritis Father    • Cancer Father    • Lung cancer Father    • Throat cancer Father    • Heart disease Sister    • Pulmonary embolism Brother      Social History     Socioeconomic History   • Marital status:      Spouse name: Not on file   • Number of children: Not on file   • Years of education: Not on file   • Highest education level: Not on file   Tobacco Use   • Smoking status: Former Smoker     Packs/day: 0.50     Years: 25.00     Pack years: 12.50     Types: Cigarettes     Last attempt to quit: 2019     Years since quittin.2   • Smokeless tobacco: Never Used   Substance and Sexual Activity   • Alcohol use: Yes     Comment: weekly- couple drinks    • Drug use: No   • Sexual activity: Defer     Allergies   Allergen Reactions   • Venlafaxine Rash     Prior to Admission medications    Medication Sig Start Date End Date Taking? Authorizing Provider   gabapentin (NEURONTIN) 300 MG capsule Take 300 mg by mouth 3 (Three) Times a Day.   Yes Sandrine Mcgarry MD   acetaminophen (TYLENOL) 325 MG tablet Take 2 tablets by mouth Every 6 (Six) Hours As Needed for Mild Pain . 20   Catie Quesada MD   carvedilol (COREG) 12.5 MG tablet Take 12.5 mg by mouth 2 (Two) Times a Day With Meals.    Sandrine Mcgarry MD   omeprazole (priLOSEC) 20 MG capsule Take 20 mg by mouth Daily.    Sandrine Mcgarry MD   pantoprazole (PROTONIX) 20 MG EC tablet Take 20 mg by mouth Daily.    Sandrine Mcgarry MD       Hospital Meds:  Scheduled-   Infusions-   No current facility-administered medications for this encounter.    PRNs-     Functional Status Prior to Current Stroke/Frontenac Score: 2    NIH Stroke  Scale  Time: 14:28  Person Administering Scale: Guru Dwyer MD    1a  Level of consciousness: 0=alert; keenly responsive   1b. LOC questions:  0=Performs both tasks correctly   1c. LOC commands: 0=Performs both tasks correctly   2.  Best Gaze: 0=normal   3.  Visual: 0=No visual loss   4. Facial Palsy: 0=Normal symmetric movement   5a.  Motor left arm: 0=No drift, limb holds 90 (or 45) degrees for full 10 seconds   5b.  Motor right arm: 4=No movement   6a. motor left le=No drift, limb holds 90 (or 45) degrees for full 10 seconds   6b  Motor right le=No drift, limb holds 90 (or 45) degrees for full 10 seconds   7. Limb Ataxia: 0=Absent   8.  Sensory: 1=Mild to moderate sensory loss; patient feels pinprick is less sharp or is dull on the affected side; there is a loss of superficial pain with pinprick but patient is aware She is being touched   9. Best Language:  0=No aphasia, normal   10. Dysarthria: 1=Mild to moderate, patient slurs at least some words and at worst, can be understood with some difficulty   11. Extinction and Inattention: 0=No abnormality    Total:   6 [baseline]       Results Reviewed:  I have personally reviewed current lab, radiology, and data and agree with results.    Results for orders placed during the hospital encounter of 20   Adult Transthoracic Echo Complete W/ Cont if Necessary Per Protocol (With Agitated Saline)    Narrative · Left ventricular systolic function is hyperdynamic (EF > 70%).  · Normal left atrial size and volume noted. No evidence of a patent   foramen ovale. Saline test results are negative  · No significant valve disease identified.              Assessment/Plan:  This is 50-year-old right-handed white female with history of left frontal ICH, IVH secondary to hypertension status post frontal craniotomy and evacuation, in 2020 presented with a transient episode of right facial droop and slurred speech.  Patient is back to baseline on my  evaluation may audiovideo interface.     CT head-did not show any acute abnormality, left frontal hypodensity old  CT head and neck bilateral fetal PCA, left vertebral artery dominant, patent proximal intracranial circulation.  MRI brain pending  EEG pending  LDL A1c, TSH pending    Based on the history, exam and diagnostics likely this could be seizure secondary to structural pathology [left frontal lesion]    #1 transient neurological dysfunction-recommend MRI brain, EEG to evaluate for any seizure tendencies or epileptiform discharges.     #2 essential hypertension-normalize blood pressure goals    #3 hyperlipidemia continue statin                  Guru Dwyer MD  September 8, 2020  2:28 PM    Verbal consent taken.  Patient agreeable to be seen via telemedicine.    This was an audio and video enabled telemedicine encounter.

## 2020-09-08 NOTE — PLAN OF CARE
Pt arrived to unit from emergency department. VSS. Pt does not complain of pain at this time. Pt states she is at her baseline. Will continue to monitor.

## 2020-09-08 NOTE — ED NOTES
At this time, House supervisor was contacted for bed assignment. She states she will call back with updates.      Lynette Howell  09/08/20 0367

## 2020-09-08 NOTE — ED PROVIDER NOTES
Subjective   50-year-old female with a past medical history significant for a hemorrhagic stroke presents to the ED for chief complaint of possible stroke.  The patient's  indicates that at 12:30 PM proximately 30 minutes prior to arrival the patient had sudden onset difficulty with speech that he describes as difficulty in pronouncing her words or finding what she wanted to say as well as some stuttering.  He also noted that she appeared to have some right-sided facial droop at that time.  He brought her directly to the ED for evaluation on arrival to the ED states that her symptoms have improved and she is almost back to her baseline where she has been since her stroke in January.  Patient denies cough shortness of breath or wheeze.  No recent illness.  No nausea vomiting diarrhea abdominal pain.  No lightheadedness or dizziness.  No other complaints at this time.          Review of Systems   Neurological: Positive for speech difficulty and weakness.   All other systems reviewed and are negative.      Past Medical History:   Diagnosis Date   • Arthritis    • Diverticulitis    • GERD (gastroesophageal reflux disease)    • Hypertension    • Stroke (CMS/HCC)        Allergies   Allergen Reactions   • Venlafaxine Rash       Past Surgical History:   Procedure Laterality Date   • CEREBRAL ANGIOGRAM N/A 1/29/2020    Procedure: Cerebral angiogram;  Surgeon: Merlin Black MD;  Location: [facility] CATH INVASIVE LOCATION;  Service: Interventional Radiology   • CHOLECYSTECTOMY     • COLONOSCOPY  2015   • CRANIOTOMY FOR SUBDURAL HEMATOMA Left 1/30/2020    Procedure: FRONTAL CRANIOTOMY FOR EVACUATION OF INTRAPARENCHYMAL HEMATOMA  LEFT;  Surgeon: Jose Cole MD;  Location: [facility] OR;  Service: Neurosurgery;  Laterality: Left;   • ENDOSCOPY W/ PEG TUBE PLACEMENT N/A 2/3/2020    Procedure: ESOPHAGOGASTRODUODENOSCOPY WITH PERCUTANEOUS ENDOSCOPIC GASTROSTOMY TUBE INSERTION AT BEDSIDE;  Surgeon: Andrew Young,  MD;  Location: [facility] ENDOSCOPY;  Service: General;  Laterality: N/A;   • HAND TENDON SURGERY Right     patient hand right thumb tendon surgery   • HIP SURGERY     • JOINT REPLACEMENT     • TUBAL ABDOMINAL LIGATION         Family History   Problem Relation Age of Onset   • Arthritis Mother    • Hypertension Mother    • Stroke Mother    • Arthritis Father    • Cancer Father    • Lung cancer Father    • Throat cancer Father    • Heart disease Sister    • Pulmonary embolism Brother        Social History     Socioeconomic History   • Marital status:      Spouse name: Not on file   • Number of children: Not on file   • Years of education: Not on file   • Highest education level: Not on file   Tobacco Use   • Smoking status: Former Smoker     Packs/day: 0.50     Years: 25.00     Pack years: 12.50     Types: Cigarettes     Last attempt to quit: 2019     Years since quittin.2   • Smokeless tobacco: Never Used   Substance and Sexual Activity   • Alcohol use: Yes     Comment: weekly- couple drinks    • Drug use: No   • Sexual activity: Defer           Objective   Physical Exam   Constitutional: She is oriented to person, place, and time. She appears well-developed and well-nourished. No distress.   HENT:   Head: Normocephalic and atraumatic.   Nose: Nose normal.   Eyes: Conjunctivae and EOM are normal.   Cardiovascular: Normal rate and regular rhythm.   Pulmonary/Chest: Effort normal and breath sounds normal. No respiratory distress.   Abdominal: Soft. She exhibits no distension. There is no tenderness. There is no guarding.   Musculoskeletal: She exhibits no edema or deformity.   Right upper extremity strength 1 out of 5.  Unable to raise the right arm.  Right lower extremity strength 3 out of 5.  No obvious drift.  Left upper and left lower extremity strength are both 5 out of 5.     Neurological: She is alert and oriented to person, place, and time. No cranial nerve deficit.   Mild right-sided facial droop  with smiling.  Mild expressive aphasia.   Skin: She is not diaphoretic.   Nursing note and vitals reviewed.      Procedures           ED Course  ED Course as of Sep 08 1522   Tue Sep 08, 2020   1307 Spoke with [facility] neurologist.  Recommend CTA head and neck.  I will obtain the initial CT stroke protocol to rule out any intracranial hemorrhage and then proceed with the CTAs if no obvious bleeding on the noncontrast CT.    [CG]   1358 EKG interpreted by me.  Sinus rhythm.  Rate of 80.  No ST segment or T wave changes.  Normal EKG    [CG]   1428 Patient seen and evaluated by [facility] neuro.  Imaging was negative.  Feels that she may have had a TIA versus a possible seizure.  Recommends admission for observation for EEG and MRI.    [CG]      ED Course User Index  [CG] Jose Juan Lincoln DO           Critical Care  Performed by: Jose Juan Lincoln DO  Authorized by: Jose Juan Lincoln DO     Critical care provider statement:     Critical care time (minutes): 40    Critical care time was exclusive of:  Separately billable procedures and treating other patients    Critical care was necessary to treat or prevent imminent or life-threatening deterioration of the following conditions: TIA, seizure, stroke, intracranial hemorrhage, other    Critical care was time spent personally by me on the following activities:  Ordering and performing treatments and interventions, development of treatment plan with patient or surrogate, discussions with consultants, evaluation of patient's response to treatment, examination of patient, ordering and review of laboratory studies, ordering and review of radiographic studies, pulse oximetry, re-evaluation of patient's condition and review of old charts                                    MDM  50-year-old female presented to the ED with speech difficulties and weakness.  She does have residual right-sided weakness from a prior hemorrhagic stroke.  She does appear to have some mild  expressive aphasia.  CT stroke protocol was negative for acute process.  CTA head and neck were negative for acute process.  Discussed case with neurology via telehealth.  He was able to evaluate the patient.  Given the negative imaging and the fact that the patient had return completely to her baseline he felt that it could be a TIA versus possible seizure.  Recommend admission for EEG and MRI.  Discussed case with Dr. Díaz, he graciously accepts the patient for admission for further evaluation medical management.            Final diagnoses:   TIA (transient ischemic attack)   Aphasia            Jose Juan Lincoln, DO  09/08/20 1522

## 2020-09-09 VITALS
DIASTOLIC BLOOD PRESSURE: 88 MMHG | RESPIRATION RATE: 18 BRPM | BODY MASS INDEX: 27.4 KG/M2 | TEMPERATURE: 98.1 F | HEIGHT: 65 IN | SYSTOLIC BLOOD PRESSURE: 121 MMHG | HEART RATE: 72 BPM | WEIGHT: 164.46 LBS | OXYGEN SATURATION: 95 %

## 2020-09-09 PROBLEM — R56.9 FOCAL SEIZURE (HCC): Status: ACTIVE | Noted: 2020-09-09

## 2020-09-09 PROBLEM — G45.9 TIA (TRANSIENT ISCHEMIC ATTACK): Status: RESOLVED | Noted: 2020-09-08 | Resolved: 2020-09-09

## 2020-09-09 PROBLEM — G47.33 OSA (OBSTRUCTIVE SLEEP APNEA): Status: RESOLVED | Noted: 2018-11-29 | Resolved: 2020-09-09

## 2020-09-09 LAB
ANION GAP SERPL CALCULATED.3IONS-SCNC: 11.5 MMOL/L (ref 5–15)
BASOPHILS # BLD AUTO: 0.09 10*3/MM3 (ref 0–0.2)
BASOPHILS NFR BLD AUTO: 1.4 % (ref 0–1.5)
BUN SERPL-MCNC: 12 MG/DL (ref 6–20)
BUN/CREAT SERPL: 18.2 (ref 7–25)
CALCIUM SPEC-SCNC: 10.2 MG/DL (ref 8.6–10.5)
CHLORIDE SERPL-SCNC: 102 MMOL/L (ref 98–107)
CHOLEST SERPL-MCNC: 233 MG/DL (ref 0–200)
CO2 SERPL-SCNC: 28.5 MMOL/L (ref 22–29)
CREAT SERPL-MCNC: 0.66 MG/DL (ref 0.57–1)
DEPRECATED RDW RBC AUTO: 46.2 FL (ref 37–54)
EOSINOPHIL # BLD AUTO: 0.35 10*3/MM3 (ref 0–0.4)
EOSINOPHIL NFR BLD AUTO: 5.6 % (ref 0.3–6.2)
ERYTHROCYTE [DISTWIDTH] IN BLOOD BY AUTOMATED COUNT: 14.6 % (ref 12.3–15.4)
GFR SERPL CREATININE-BSD FRML MDRD: 94 ML/MIN/1.73
GLUCOSE SERPL-MCNC: 97 MG/DL (ref 65–99)
HCT VFR BLD AUTO: 40.8 % (ref 34–46.6)
HDLC SERPL-MCNC: 80 MG/DL (ref 40–60)
HGB BLD-MCNC: 13.5 G/DL (ref 12–15.9)
IMM GRANULOCYTES # BLD AUTO: 0.02 10*3/MM3 (ref 0–0.05)
IMM GRANULOCYTES NFR BLD AUTO: 0.3 % (ref 0–0.5)
INR PPP: 0.97 (ref 0.9–1.1)
LDLC SERPL CALC-MCNC: 136 MG/DL (ref 0–100)
LDLC/HDLC SERPL: 1.7 {RATIO}
LYMPHOCYTES # BLD AUTO: 1.97 10*3/MM3 (ref 0.7–3.1)
LYMPHOCYTES NFR BLD AUTO: 31.4 % (ref 19.6–45.3)
MCH RBC QN AUTO: 28.6 PG (ref 26.6–33)
MCHC RBC AUTO-ENTMCNC: 33.1 G/DL (ref 31.5–35.7)
MCV RBC AUTO: 86.4 FL (ref 79–97)
MONOCYTES # BLD AUTO: 0.44 10*3/MM3 (ref 0.1–0.9)
MONOCYTES NFR BLD AUTO: 7 % (ref 5–12)
NEUTROPHILS NFR BLD AUTO: 3.4 10*3/MM3 (ref 1.7–7)
NEUTROPHILS NFR BLD AUTO: 54.3 % (ref 42.7–76)
NRBC BLD AUTO-RTO: 0 /100 WBC (ref 0–0.2)
PLATELET # BLD AUTO: 287 10*3/MM3 (ref 140–450)
PMV BLD AUTO: 9.2 FL (ref 6–12)
POTASSIUM SERPL-SCNC: 4.1 MMOL/L (ref 3.5–5.2)
PROTHROMBIN TIME: 13.2 SECONDS (ref 12–15.1)
RBC # BLD AUTO: 4.72 10*6/MM3 (ref 3.77–5.28)
SODIUM SERPL-SCNC: 142 MMOL/L (ref 136–145)
TRIGL SERPL-MCNC: 84 MG/DL (ref 0–150)
TSH SERPL DL<=0.05 MIU/L-ACNC: 1.49 UIU/ML (ref 0.27–4.2)
VLDLC SERPL-MCNC: 16.8 MG/DL
WBC # BLD AUTO: 6.27 10*3/MM3 (ref 3.4–10.8)

## 2020-09-09 PROCEDURE — 97161 PT EVAL LOW COMPLEX 20 MIN: CPT

## 2020-09-09 PROCEDURE — G0378 HOSPITAL OBSERVATION PER HR: HCPCS

## 2020-09-09 PROCEDURE — 85025 COMPLETE CBC W/AUTO DIFF WBC: CPT | Performed by: INTERNAL MEDICINE

## 2020-09-09 PROCEDURE — 84443 ASSAY THYROID STIM HORMONE: CPT | Performed by: INTERNAL MEDICINE

## 2020-09-09 PROCEDURE — 80061 LIPID PANEL: CPT | Performed by: INTERNAL MEDICINE

## 2020-09-09 PROCEDURE — 99217 PR OBSERVATION CARE DISCHARGE MANAGEMENT: CPT | Performed by: INTERNAL MEDICINE

## 2020-09-09 PROCEDURE — 80048 BASIC METABOLIC PNL TOTAL CA: CPT | Performed by: INTERNAL MEDICINE

## 2020-09-09 PROCEDURE — 99214 OFFICE O/P EST MOD 30 MIN: CPT | Performed by: STUDENT IN AN ORGANIZED HEALTH CARE EDUCATION/TRAINING PROGRAM

## 2020-09-09 PROCEDURE — 85610 PROTHROMBIN TIME: CPT | Performed by: INTERNAL MEDICINE

## 2020-09-09 PROCEDURE — 97165 OT EVAL LOW COMPLEX 30 MIN: CPT

## 2020-09-09 RX ORDER — LEVETIRACETAM 500 MG/1
500 TABLET ORAL EVERY 12 HOURS SCHEDULED
Status: DISCONTINUED | OUTPATIENT
Start: 2020-09-09 | End: 2020-09-09 | Stop reason: HOSPADM

## 2020-09-09 RX ORDER — ATORVASTATIN CALCIUM 40 MG/1
40 TABLET, FILM COATED ORAL NIGHTLY
Qty: 30 TABLET | Refills: 0 | Status: SHIPPED | OUTPATIENT
Start: 2020-09-09 | End: 2022-11-17

## 2020-09-09 RX ORDER — LEVETIRACETAM 500 MG/1
500 TABLET ORAL EVERY 12 HOURS SCHEDULED
Qty: 60 TABLET | Refills: 0 | Status: SHIPPED | OUTPATIENT
Start: 2020-09-09 | End: 2020-10-30 | Stop reason: SDUPTHER

## 2020-09-09 RX ADMIN — GABAPENTIN 300 MG: 300 CAPSULE ORAL at 08:37

## 2020-09-09 RX ADMIN — LEVETIRACETAM 500 MG: 500 TABLET ORAL at 12:48

## 2020-09-09 RX ADMIN — CARVEDILOL 12.5 MG: 12.5 TABLET, FILM COATED ORAL at 08:37

## 2020-09-09 RX ADMIN — SODIUM CHLORIDE, PRESERVATIVE FREE 10 ML: 5 INJECTION INTRAVENOUS at 08:38

## 2020-09-09 RX ADMIN — PANTOPRAZOLE SODIUM 40 MG: 40 TABLET, DELAYED RELEASE ORAL at 05:48

## 2020-09-09 NOTE — PROGRESS NOTES
Discharge Planning Assessment  [facility]     Patient Name: Yee Goodwin  MRN: [id number]  Today's Date: 9/9/2020    Admit Date: 9/8/2020    Discharge Needs Assessment     Row Name 09/09/20 1247       Living Environment    Name(s) of Who Lives With Patient   Quentin    Potentially Unsafe Housing Conditions  unable to assess    Primary Care Provided by  self    Provides Primary Care For  no one, unable/limited ability to care for self    Family Caregiver if Needed  none    Quality of Family Relationships  supportive    Able to Return to Prior Arrangements  yes    Living Arrangement Comments  Lives with her  in a 1 story house, has 2 to 3 steps but also has a ramp       Discharge Needs Assessment    Readmission Within the Last 30 Days  no previous admission in last 30 days    Concerns to be Addressed  no discharge needs identified    Anticipated Changes Related to Illness  none    Equipment Needed After Discharge  none    Provided Post Acute Provider List?  N/A    N/A Provider List Comment  No DME, HH, or Skilled nursing needs        Discharge Plan     Row Name 09/09/20 4821       Plan    Plan Spoke to pt and  Quentin in room.  Permission granted to speak in front of .  Lives with  in a one story house, has a few steps but also has a ramp.  Address and phone no correct.  is her POA but no papers are on file. .  No Living will.  Has all the equiment she needs, a shower chair, BSC, 2 walkers one standard and one elvin , a shower chair.  No o2.  No Home Health.  Planning to go home at discharge and currently has a DC order.   will transport.  Denies further DC needs.                 Expected Discharge Date and Time     Expected Discharge Date Expected Discharge Time    Sep 9, 2020         Demographic Summary     Row Name 09/09/20 1243       General Information    Admission Type  observation    Arrived From  emergency department    Expected Length of Stay (LOS)   1 o 2  days     Referral Source  admission list    Reason for Consult  discharge planning    Preferred Language  English     Used During This Interaction  no        Functional Status     Row Name 09/09/20 1246       Functional Status    Usual Activity Tolerance  moderate    Current Activity Tolerance  moderate    Functional Status Comments  Independent        Functional Status, IADL    Medications  independent    Meal Preparation  assistive person    Housekeeping  assistive person    Laundry  assistive person    Shopping  assistive person    IADL Comments  Self       Mental Status    General Appearance WDL  WDL       Mental Status Summary    Recent Changes in Mental Status/Cognitive Functioning  no changes    Mental Status Comments  Alert and oriented                Myra Busby RN

## 2020-09-09 NOTE — PLAN OF CARE
Problem: Patient Care Overview  Goal: Plan of Care Review  Outcome: Ongoing (interventions implemented as appropriate)  Flowsheets  Taken 9/9/2020 0454  Progress: no change  Taken 9/8/2020 2000  Plan of Care Reviewed With: patient   VSS. Q4h neuro checks. Pt requested meds for sleep. No complaints this shift. Will continue to monitor.

## 2020-09-09 NOTE — THERAPY DISCHARGE NOTE
Acute Care - Occupational Therapy Initial Eval/Discharge   José Miguel     Patient Name: Yee Goodwin  : 1970  MRN: [id number]  Today's Date: 2020  Onset of Illness/Injury or Date of Surgery: 20  Date of Referral to OT: 20  Referring Physician: Dr. Díaz      Admit Date: 2020       ICD-10-CM ICD-9-CM   1. TIA (transient ischemic attack) G45.9 435.9   2. Aphasia R47.01 784.3   3. Impaired mobility and ADLs Z74.09 V49.89    Z78.9      Patient Active Problem List   Diagnosis   • History of right hip replacement   • PUD (peptic ulcer disease)   • S/P madan   • Essential hypertension   • Gastritis   • JAYESH (obstructive sleep apnea)   • Epigastric pain   • Diarrhea   • Diverticulosis   • Hiatal hernia   • Tobacco abuse   • Acute L frontal ICH 20   • Elevated transaminase levels   • Dyslipidemia   • Acute intracerebral hemorrhage (CMS/HCC)   • S/P L frontal craniotomy and evacuation of intraparenchymal hematoma 20   • TIA (transient ischemic attack)   • Hemiparesis of right dominant side as late effect of nontraumatic intracerebral hemorrhage (CMS/HCC)     Past Medical History:   Diagnosis Date   • Arthritis    • Diverticulitis    • GERD (gastroesophageal reflux disease)    • Hypertension    • Stroke (CMS/HCC)      Past Surgical History:   Procedure Laterality Date   • CEREBRAL ANGIOGRAM N/A 2020    Procedure: Cerebral angiogram;  Surgeon: Merlin Black MD;  Location: [facility] CATH INVASIVE LOCATION;  Service: Interventional Radiology   • CHOLECYSTECTOMY     • COLONOSCOPY     • CRANIOTOMY FOR SUBDURAL HEMATOMA Left 2020    Procedure: FRONTAL CRANIOTOMY FOR EVACUATION OF INTRAPARENCHYMAL HEMATOMA  LEFT;  Surgeon: Jose Cole MD;  Location: [facility] OR;  Service: Neurosurgery;  Laterality: Left;   • ENDOSCOPY W/ PEG TUBE PLACEMENT N/A 2/3/2020    Procedure: ESOPHAGOGASTRODUODENOSCOPY WITH PERCUTANEOUS ENDOSCOPIC GASTROSTOMY TUBE INSERTION AT BEDSIDE;  Surgeon:  Andrew Young MD;  Location: [facility] ENDOSCOPY;  Service: General;  Laterality: N/A;   • HAND TENDON SURGERY Right     patient hand right thumb tendon surgery   • HIP SURGERY     • JOINT REPLACEMENT     • TUBAL ABDOMINAL LIGATION            OT ASSESSMENT FLOWSHEET (last 12 hours)      Occupational Therapy Evaluation     Row Name 09/09/20 0958                   OT Evaluation Time/Intention    Subjective Information  no complaints  -SD        Document Type  discharge evaluation/summary  -SD        Mode of Treatment  occupational therapy  -SD        Patient Effort  good  -SD        Symptoms Noted During/After Treatment  none  -SD           General Information    Patient Profile Reviewed?  yes  -SD        Onset of Illness/Injury or Date of Surgery  09/08/20  -SD        Referring Physician  Dr. Díaz  -SD        Patient Observations  alert;cooperative;agree to therapy  -SD        General Observations of Patient  Supine in bed  -SD        Prior Level of Function  independent:;all household mobility;transfer;min assist:;bathing  -SD        Equipment Currently Used at Home  commode, bedside;walker, rolling;wheelchair;walker, elvin;shower chair  -SD        Pertinent History of Current Functional Problem  TIA; HTN, HLD, JAYESH, L frontal lobe intracranial hemorrhage with residual R hemiparesis  -SD        Existing Precautions/Restrictions  fall  -SD        Risks Reviewed  patient:;increased discomfort  -SD        Benefits Reviewed  patient:;improve function;increase independence;increase strength;increase balance  -SD           Relationship/Environment    Primary Source of Support/Comfort  significant other  -SD        Lives With  significant other  -SD           Resource/Environmental Concerns    Current Living Arrangements  home/apartment/condo  -SD           Home Main Entrance    Number of Stairs, Main Entrance  three  -SD        Stair Railings, Main Entrance  none  -SD           Cognitive Assessment/Interventions     Additional Documentation  Cognitive Assessment/Intervention (Group)  -SD           Cognitive Assessment/Intervention- PT/OT    Orientation Status (Cognition)  oriented x 4  -SD        Follows Commands (Cognition)  verbal cues/prompting required  -SD        Safety Deficit (Cognitive)  safety precautions follow-through/compliance  -SD        Personal Safety Interventions  fall prevention program maintained;gait belt;nonskid shoes/slippers when out of bed  -SD           Safety Issues, Functional Mobility    Safety Issues Affecting Function (Mobility)  safety precautions follow-through/compliance  -SD        Impairments Affecting Function (Mobility)  endurance/activity tolerance;grasp;range of motion (ROM)  -SD           Bed Mobility Assessment/Treatment    Bed Mobility Assessment/Treatment  supine-sit  -SD        Supine-Sit [place] (Bed Mobility)  conditional independence  -SD        Assistive Device (Bed Mobility)  head of bed elevated  -SD           Functional Mobility    Functional Mobility- Ind. Level  contact guard assist  -SD        Functional Mobility- Device  rolling walker decreased  strength to hold walker; elvin walker w/ PT  -SD        Functional Mobility-Distance (Feet)  12  -SD        Functional Mobility- Safety Issues  balance decreased during turns  -SD           Transfer Assessment/Treatment    Transfer Assessment/Treatment  sit-stand transfer;stand-sit transfer  -SD           Sit-Stand Transfer    Sit-Stand [place] (Transfers)  stand by assist  -SD        Assistive Device (Sit-Stand Transfers)  walker, front-wheeled  -SD           Stand-Sit Transfer    Stand-Sit [place] (Transfers)  stand by assist  -SD        Assistive Device (Stand-Sit Transfers)  walker, front-wheeled  -SD           ADL Assessment/Intervention    BADL Assessment/Intervention  bathing;upper body dressing;lower body dressing;grooming;feeding;toileting  -SD           Bathing Assessment/Intervention    Bathing  [place] Level  minimum assist (75% patient effort)  -SD        Comment (Bathing)  at baseline  -SD           Upper Body Dressing Assessment/Training    Upper Body Dressing [place] Level  set up  -SD           Lower Body Dressing Assessment/Training    Lower Body Dressing [place] Level  supervision  -SD           Grooming Assessment/Training    [place] Level (Grooming)  set up  -SD           Self-Feeding Assessment/Training    [place] Level (Feeding)  set up  -SD           Toileting Assessment/Training    [place] Level (Toileting)  supervision  -SD           BADL Safety/Performance    Impairments, BADL Safety/Performance  grasp/prehension;coordination;range of motion  -SD           General ROM    GENERAL ROM COMMENTS  R UE hemiparesis; PROM assessed and WFL  -SD           MMT (Manual Muscle Testing)    General MMT Comments  L UE WFL; R UE 2/5  -SD           Positioning and Restraints    Pre-Treatment Position  in bed  -SD        Post Treatment Position  chair  -SD        In Chair  sitting;call light within reach;encouraged to call for assist  -SD           Pain Assessment    Additional Documentation  Pain Scale: Numbers Pre/Post-Treatment (Group)  -SD           Pain Scale: Numbers Pre/Post-Treatment    Pain Scale: Numbers, Pretreatment  0/10 - no pain  -SD        Pain Scale: Numbers, Post-Treatment  0/10 - no pain  -SD           Coping    Observed Emotional State  calm;cooperative  -SD        Verbalized Emotional State  acceptance  -SD           Plan of Care Review    Plan of Care Reviewed With  patient  -SD        Progress  no change  -SD        Outcome Summary  OT eval completed. Patient completed bed mobility with conditional independence, transfers with SBA, walked around bed using RW with CGA d/t insufficient  strength of R hand to hold to walker. Patient requires S/U-sup for ADLs and reports is at her baseline functional performance for self care, no further OT needs.   -SD            Clinical Impression (OT)    Date of Referral to OT  09/08/20  -SD        OT Diagnosis  ADL decline  -SD        Patient/Family Goals Statement (OT Eval)  Return home with assist from   -SD        Criteria for Skilled Therapeutic Interventions Met (OT Eval)  no problems identified which require skilled intervention;current level of function same as previous level of function  -SD        Rehab Potential (OT Eval)  good, to achieve stated therapy goals  -SD        Therapy Frequency (OT Eval)  evaluation only  -SD        Care Plan Review (OT)  evaluation/treatment results reviewed  -SD        Anticipated Discharge Disposition (OT)  home with assist;home with OP services  -SD           Vital Signs    O2 Delivery Pre Treatment  supplemental O2  -SD        O2 Delivery Intra Treatment  room air  -SD        O2 Delivery Post Treatment  supplemental O2  -SD           Planned OT Interventions    Planned Therapy Interventions (OT Eval)  --  -SD           Discharge Summary (Occupational Therapy)    Additional Documentation  Discharge Summary, OT Eval (Group)  -SD           Discharge Summary, OT Eval    Reason for Discharge (OT Discharge Summary)  no further needs identified PT to address gait using elvin walker   -SD           Living Environment    Home Accessibility  stairs to enter home  -SD          User Key  (r) = Recorded By, (t) = Taken By, (c) = Cosigned By    Initials Name Effective Dates    An Fortune OT 03/07/18 -           Occupational Therapy Education                 Title: PT OT SLP Therapies (In Progress)     Topic: Occupational Therapy (In Progress)     Point: ADL training (Done)     Description:   Instruct learner(s) on proper safety adaptation and remediation techniques during self care or transfers.   Instruct in proper use of assistive devices.              Learning Progress Summary           Patient Acceptance, E,TB, VU by SD at 9/9/2020 1422    Comment:  Benefit of OT; no further OT  needs at this time.                   Point: Home exercise program (Not Started)     Description:   Instruct learner(s) on appropriate technique for monitoring, assisting and/or progressing therapeutic exercises/activities.              Learner Progress:   Not documented in this visit.          Point: Precautions (Not Started)     Description:   Instruct learner(s) on prescribed precautions during self-care and functional transfers.              Learner Progress:   Not documented in this visit.          Point: Body mechanics (Not Started)     Description:   Instruct learner(s) on proper positioning and spine alignment during self-care, functional mobility activities and/or exercises.              Learner Progress:   Not documented in this visit.                      User Key     Initials Effective Dates Name Provider Type Discipline    SD 03/07/18 -  An Suggs OT Occupational Therapist OT                OT Recommendation and Plan  Outcome Summary/Treatment Plan (OT)  Anticipated Discharge Disposition (OT): home with assist, home with OP services  Reason for Discharge (OT Discharge Summary): no further needs identified(PT to address gait using elvin walker )  Therapy Frequency (OT Eval): evaluation only  Plan of Care Review  Plan of Care Reviewed With: patient  Plan of Care Reviewed With: patient  Outcome Summary: OT eval completed. Patient completed bed mobility with conditional independence, transfers with SBA, walked around bed using RW with CGA d/t insufficient  strength of R hand to hold to walker. Patient requires S/U-sup for ADLs and reports is at her baseline functional performance for self care, no further OT needs.          Outcome Measures     Row Name 09/09/20 0958             How much help from another is currently needed...    Putting on and taking off regular lower body clothing?  4  -SD      Bathing (including washing, rinsing, and drying)  3  -SD      Toileting (which includes using toilet  bed pan or urinal)  3  -SD      Putting on and taking off regular upper body clothing  4  -SD      Taking care of personal grooming (such as brushing teeth)  4  -SD      Eating meals  4  -SD      AM-PAC 6 Clicks Score (OT)  22  -SD         Functional Assessment    Outcome Measure Options  AM-PAC 6 Clicks Daily Activity (OT)  -SD        User Key  (r) = Recorded By, (t) = Taken By, (c) = Cosigned By    Initials Name Provider Type    An Fortune OT Occupational Therapist          Time Calculation:   Time Calculation- OT     Row Name 09/09/20 1154             Time Calculation- OT    OT Start Time  0958  -SD      OT Received On  09/09/20  -SD        User Key  (r) = Recorded By, (t) = Taken By, (c) = Cosigned By    Initials Name Provider Type    An Fortune OT Occupational Therapist        Therapy Suggested Charges     Code   Minutes Charges    None           Therapy Charges for Today     Code Description Service Date Service Provider Modifiers Qty    [id number] HC OT EVAL LOW COMPLEXITY 3 9/9/2020 An Suggs OT GO 1               OT Discharge Summary  Anticipated Discharge Disposition (OT): home with assist, home with OP services    An Suggs OT  9/9/2020

## 2020-09-09 NOTE — PROGRESS NOTES
Stroke Progress Note       Chief Complaint:   Right facial droop and stuttering speech    Subjective    Subjective     Subjective:  Her symptoms resolved,  Patient states that she is back to baseline.     Review of Systems   Constitutional: No fatigue  HENT: Negative for nosebleeds and rhinorrhea.    Eyes: Negative for redness.   Respiratory: Negative for cough.    Gastrointestinal: Negative for anal bleeding.   Endocrine: Negative for polydipsia.   Genitourinary: Negative for enuresis and urgency.   Musculoskeletal: Negative for joint swelling.   Neurological: Negative for tremors.   Psychiatric/Behavioral: Negative for hallucinations.     Objective      Temp:  [97.4 °F (36.3 °C)-98.6 °F (37 °C)] 98.1 °F (36.7 °C)  Heart Rate:  [] 72  Resp:  [16-18] 18  BP: (117-170)/() 121/88    MENTAL STATUS: AAOx3, memory intact, fund of knowledge appropriate     LANG/SPEECH: Naming and repetition intact, fluent, follows 3-step commands     CRANIAL NERVES:       II: Pupils equal and reactive, no RAPD, no VF deficits, fundus(not done)       III, IV, VI: EOM intact, no gaze preference or deviation, no nystagmus.       V: normal sensation in V1, V2, and V3 segments bilaterally       VII: no asymmetry, no nasolabial fold flattening       VIII: normal hearing to speech       IX, X: normal palatal elevation, no uvular deviation       XI: 5/5 head turn and 5/5 shoulder shrug bilaterally       XII: midline tongue protrusion     MOTOR:  Increased tone on the right upper and lower  0/5 muscle power in Rt shoulder abductors/adductors, elbow flexors/extensors, wrist flexors/extensors, finger abductors/adductors.  4/5 in Rt hip flexors/extensors, knee flexors/extensors, ankle dorsiflexors and plantar flexors.     5/5 muscle power in Lt shoulder abductors/adductors, elbow flexors/extensors, wrist flexors/extensors, finger abductors/adductors.  5/5 in Lt hip flexors/extensors, knee flexors/extensors, ankle dorsiflexors and plantea  flexors.     REFLEXES:  no Espitia's, no clonus     SENSORY:     Decreased on the right side ( baseline)     COORDINATION: Normal finger to nose and heel to shin, no tremor, no dysmetria     STATION: Not assessed due to patient condition     GAIT: Not assessed due to patient condition      Results Review:    I reviewed the patient's new clinical results.    Lab Results (last 24 hours)     Procedure Component Value Units Date/Time    TSH [[id number]]  (Normal) Collected:  09/09/20 0536    Specimen:  Blood Updated:  09/09/20 0630     TSH 1.490 uIU/mL     Basic Metabolic Panel [[id number]]  (Normal) Collected:  09/09/20 0536    Specimen:  Blood Updated:  09/09/20 0628     Glucose 97 mg/dL      BUN 12 mg/dL      Creatinine 0.66 mg/dL      Sodium 142 mmol/L      Potassium 4.1 mmol/L      Chloride 102 mmol/L      CO2 28.5 mmol/L      Calcium 10.2 mg/dL      eGFR Non African Amer 94 mL/min/1.73      BUN/Creatinine Ratio 18.2     Anion Gap 11.5 mmol/L     Narrative:       GFR Normal >60  Chronic Kidney Disease <60  Kidney Failure <15      Lipid Panel [[id number]]  (Abnormal) Collected:  09/09/20 0536    Specimen:  Blood Updated:  09/09/20 0628     Total Cholesterol 233 mg/dL      Triglycerides 84 mg/dL      HDL Cholesterol 80 mg/dL      LDL Cholesterol  136 mg/dL      VLDL Cholesterol 16.8 mg/dL      LDL/HDL Ratio 1.70    Narrative:       Cholesterol Reference Ranges  (U.[place] Department of Health and Human Services ATP III Classifications)    Desirable          <200 mg/dL  Borderline High    200-239 mg/dL  High Risk          >240 mg/dL      Triglyceride Reference Ranges  (U.S. Department of Health and Human Services ATP III Classifications)    Normal           <150 mg/dL  Borderline High  150-199 mg/dL  High             200-499 mg/dL  Very High        >500 mg/dL    HDL Reference Ranges  (U.S. Department of Health and Human Services ATP III Classifcations)    Low     <40 mg/dl (major risk factor for CHD)  High    >60 mg/dl  ('negative' risk factor for CHD)        LDL Reference Ranges  (U.S. Department of Health and Human Services ATP III Classifcations)    Optimal          <100 mg/dL  Near Optimal     100-129 mg/dL  Borderline High  130-159 mg/dL  High             160-189 mg/dL  Very High        >189 mg/dL    Protime-INR [[id number]]  (Normal) Collected:  09/09/20 0536    Specimen:  Blood Updated:  09/09/20 0612     Protime 13.2 Seconds      INR 0.97    Narrative:       Suggested INR therapeutic range for stable oral anticoagulant therapy:    Low Intensity therapy:   1.5-2.0  Moderate Intensity therapy:   2.0-3.0  High Intensity therapy:   2.5-4.0    CBC Auto Differential [[id number]]  (Normal) Collected:  09/09/20 0536    Specimen:  Blood Updated:  09/09/20 0601     WBC 6.27 10*3/mm3      RBC 4.72 10*6/mm3      Hemoglobin 13.5 g/dL      Hematocrit 40.8 %      MCV 86.4 fL      MCH 28.6 pg      MCHC 33.1 g/dL      RDW 14.6 %      RDW-SD 46.2 fl      MPV 9.2 fL      Platelets 287 10*3/mm3      Neutrophil % 54.3 %      Lymphocyte % 31.4 %      Monocyte % 7.0 %      Eosinophil % 5.6 %      Basophil % 1.4 %      Immature Grans % 0.3 %      Neutrophils, Absolute 3.40 10*3/mm3      Lymphocytes, Absolute 1.97 10*3/mm3      Monocytes, Absolute 0.44 10*3/mm3      Eosinophils, Absolute 0.35 10*3/mm3      Basophils, Absolute 0.09 10*3/mm3      Immature Grans, Absolute 0.02 10*3/mm3      nRBC 0.0 /100 WBC     POC Glucose Once [[id number]]  (Normal) Collected:  09/08/20 1315    Specimen:  Blood Updated:  09/08/20 1617     Glucose 106 mg/dL      Comment: Serial Number: [id number]:  [id number]       Troponin [[id number]]  (Normal) Collected:  09/08/20 1322    Specimen:  Blood Updated:  09/08/20 1351     Troponin T <0.010 ng/mL     Narrative:       Troponin T Reference Range:  <= 0.03 ng/mL-   Negative for AMI  >0.03 ng/mL-     Abnormal for myocardial necrosis.  Clinicians would have to utilize clinical acumen, EKG, Troponin and serial changes to  determine if it is an Acute Myocardial Infarction or myocardial injury due to an underlying chronic condition.       Results may be falsely decreased if patient taking Biotin.      Comprehensive Metabolic Panel [[id number]]  (Abnormal) Collected:  09/08/20 1322    Specimen:  Blood Updated:  09/08/20 1349     Glucose 110 mg/dL      BUN 15 mg/dL      Creatinine 0.55 mg/dL      Sodium 138 mmol/L      Potassium 4.2 mmol/L      Chloride 103 mmol/L      CO2 24.0 mmol/L      Calcium 10.2 mg/dL      Total Protein 7.8 g/dL      Albumin 4.60 g/dL      ALT (SGPT) 28 U/L      AST (SGOT) 22 U/L      Alkaline Phosphatase 137 U/L      Total Bilirubin 0.4 mg/dL      eGFR Non African Amer 116 mL/min/1.73      Globulin 3.2 gm/dL      A/G Ratio 1.4 g/dL      BUN/Creatinine Ratio 27.3     Anion Gap 11.0 mmol/L     Narrative:       GFR Normal >60  Chronic Kidney Disease <60  Kidney Failure <15      Protime-INR [[id number]]  (Normal) Collected:  09/08/20 1322    Specimen:  Blood Updated:  09/08/20 1346     Protime 12.5 Seconds      INR 0.90    Narrative:       Suggested INR therapeutic range for stable oral anticoagulant therapy:    Low Intensity therapy:   1.5-2.0  Moderate Intensity therapy:   2.0-3.0  High Intensity therapy:   2.5-4.0    aPTT [[id number]]  (Abnormal) Collected:  09/08/20 1322    Specimen:  Blood Updated:  09/08/20 1346     PTT 23.3 seconds     CBC & Differential [[id number]] Collected:  09/08/20 1322    Specimen:  Blood Updated:  09/08/20 1327    Narrative:       The following orders were created for panel order CBC & Differential.  Procedure                               Abnormality         Status                     ---------                               -----------         ------                     CBC Auto Differential[[id number]]        Normal              Final result                 Please view results for these tests on the individual orders.    CBC Auto Differential [[id number]]  (Normal) Collected:  09/08/20  1322    Specimen:  Blood Updated:  09/08/20 1327     WBC 7.88 10*3/mm3      RBC 4.69 10*6/mm3      Hemoglobin 13.7 g/dL      Hematocrit 40.7 %      MCV 86.8 fL      MCH 29.2 pg      MCHC 33.7 g/dL      RDW 14.6 %      RDW-SD 46.6 fl      MPV 8.9 fL      Platelets 320 10*3/mm3      Neutrophil % 58.8 %      Lymphocyte % 27.7 %      Monocyte % 6.6 %      Eosinophil % 5.1 %      Basophil % 1.4 %      Immature Grans % 0.4 %      Neutrophils, Absolute 4.64 10*3/mm3      Lymphocytes, Absolute 2.18 10*3/mm3      Monocytes, Absolute 0.52 10*3/mm3      Eosinophils, Absolute 0.40 10*3/mm3      Basophils, Absolute 0.11 10*3/mm3      Immature Grans, Absolute 0.03 10*3/mm3      nRBC 0.0 /100 WBC         Ct Angiogram Neck    Result Date: 9/8/2020  1. Plaque within both carotid bulbs and the proximal ICAs without a hemodynamically significant stenosis identified. 2. Unremarkable CT angiogram of the brain.   This study was performed with techniques to keep radiation doses as low as reasonably achievable (ALARA). Individualized dose reduction techniques using automated exposure control or adjustment of mA and/or kV according to the patient size were employed.   This report was finalized on 9/8/2020 1:47 PM by Malka Wood M.D..    Mri Brain Without Contrast    Result Date: 9/8/2020  Encephalomalacia in the left frontal lobe with no acute intracranial abnormality.    This report was finalized on 9/8/2020 3:26 PM by Malka Wood M.D..    Ct Angiogram Head    Result Date: 9/8/2020  1. Plaque within both carotid bulbs and the proximal ICAs without a hemodynamically significant stenosis identified. 2. Unremarkable CT angiogram of the brain.   This study was performed with techniques to keep radiation doses as low as reasonably achievable (ALARA). Individualized dose reduction techniques using automated exposure control or adjustment of mA and/or kV according to the patient size were employed.  This report was finalized on  9/8/2020 1:46 PM by Malka Wood M.D..    Ct Head Without Contrast Stroke Protocol    Result Date: 9/8/2020  No acute intracranial process.    704.44 mGy.cm   This study was performed with techniques to keep radiation doses as low as reasonably achievable (ALARA). Individualized dose reduction techniques using automated exposure control or adjustment of mA and/or kV according to the patient size were employed.  This report was finalized on 9/8/2020 1:31 PM by Malka Wood M.D..    Results for orders placed during the hospital encounter of 01/28/20   Adult Transthoracic Echo Complete W/ Cont if Necessary Per Protocol (With Agitated Saline)    Narrative · Left ventricular systolic function is hyperdynamic (EF > 70%).  · Normal left atrial size and volume noted. No evidence of a patent   foramen ovale. Saline test results are negative  · No significant valve disease identified.                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  Patient is back to baseline on my evaluation via  audiovideo interface.      CT head-did not show any acute abnormality, left frontal hypodensity old  CT head and neck bilateral fetal PCA, left vertebral artery dominant, patent proximal intracranial circulation.  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 Recommend Keppra 500 mg BID and follow up with outpatient neurology.      #2 essential hypertension-normal blood pressure goals     #3 Mixed hyperlipidemia- start Lipitor 40 daily.                         Guru Dwyer MD  09/09/20  11:49    This was an audio and video enabled telemedicine encounter.

## 2020-09-09 NOTE — DISCHARGE SUMMARY
[facility]      Name:  Yee Goodwin   Age:  50 y.o.  Sex:  female  :  1970  MRN:  [id number]   Visit Number:  [id number]    Admission Date:  2020  Date of Discharge:  2020  Primary Care Physician:  Lula Marlow DO    Important issues to note:    1.  Patient has been started on antiseizure medication Keppra 500 mg twice daily.  2.  She has also been started on atorvastatin 40 mg nightly.  3.  Follow-up with Dr. Padilla, neurology at [facility] in 2 to 3 weeks.  4.  Follow-up with primary care physician in 1 week.    Discharge Diagnoses:     1.  Focal seizure episode secondary to scarring from #2, present on admission.  2.  History of left frontal intracranial hemorrhage in 2020 status post craniotomy.  3.  Residual right spastic hemiparesis secondary to #2.  4.  Essential hypertension, uncontrolled, present on admission, improved.  5.  Gastroesophageal reflux disease.  6.  Hypercholesterolemia, started on atorvastatin.    Problem List:       Focal seizure (CMS/HCC)    Essential hypertension    Hemiparesis of right dominant side as late effect of nontraumatic intracerebral hemorrhage (CMS/HCC)    Presenting Problem:    TIA (transient ischemic attack) [G45.9]     Consults:     Teleneurology: Guru Dwyer MD    Procedures Performed:    None.    History of presenting illness:    This is a 50-year-old female with history of hypertension, hyperlipidemia, who had left frontal lobe intracranial hemorrhage in 2020 needing admission in the ICU at [facility] with left frontal craniotomy, residual spastic right hemiparesis was brought to the emergency room by her  with right facial droop and slurred speech lasting for about 15 minutes.  When the patient arrived in the emergency room, she was back to her baseline.  The history is obtained from the patient as well as her .  Patient apparently  developed drooping of the right side of the face associated with some slurred speech prior to arrival that lasted for about 15 minutes.  There was no tonic-clonic seizures.  No left-sided weakness.  Patient does have history of right hemiparesis but apparently walks with a walker at home.  Her right upper extremity however is extremely weak with contractures.     In the emergency room, she was afebrile and hemodynamically stable except for initial blood pressure of 170/100.  Her pulse oxygen saturation was 94% on room air.  Blood work including troponin, CMP, CBC, PT/INR were unremarkable.  CT of the head without contrast was negative for any acute intracranial abnormalities.  CT angiogram of the head and neck was negative for any hemodynamically significant stenosis.  A call was placed to Dr. Dwyer from the teleneurology service who evaluated the patient and felt that the patient may have had seizure episode secondary to the previous brain injury and scarring.  He recommended MRI and EEG.     Patient did have an MRI done in the emergency room.  It showed encephalomalacia in the left frontal lobe from her previous ICH.  It did not show any acute intracranial abnormalities.  Patient is currently being admitted to the medical floor with telemetry for observation and further evaluation.    Hospital Course:    Ms. Goodwin was admitted to the medical floor with telemetry.  She did undergo EEG following her admission on 9/8/2020.  It showed left frontal slowing and breach effect (from prior craniotomy) with no epileptiform activity noted.  She did not have any further episodes of facial drooping or slurred speech during the hospital stay.  She was seen by physical therapy and was able to walk with the help.  No new weakness or focal deficits noted.    Patient was evaluated by Dr. Dwyer today by video visit.  He discussed the EEG findings with the patient and felt that her symptoms were likely related to the focal  seizure coming from her left frontal scarring.  He recommended initiation of Keppra 500 mg twice daily.  He also recommended initiation of atorvastatin for her hyperlipidemia.  He did not at this time feel that the patient would benefit from low-dose aspirin therapy.  Patient was recommended outpatient follow-up with neurology in 2 to 3 weeks.  I have discussed the patient's condition and treatment plan with Dr. Dwyer today.    Patient is currently feeling better and is eager to go home.  She declined any home needs at this time.  I have discussed the patient's condition with her  and advised him regarding outpatient follow-up.  She will be scheduled to follow-up with Dr. Padilla in 2 to 3 weeks.  She is advised to follow-up with her primary care physician in 1 week.    Vital Signs:    Temp:  [97.4 °F (36.3 °C)-98.6 °F (37 °C)] 98.1 °F (36.7 °C)  Heart Rate:  [] 72  Resp:  [16-18] 18  BP: (117-170)/() 121/88    Physical Exam:    General Appearance:  Alert and cooperative, not in any acute distress.   Head:  Atraumatic and normocephalic, minimal right upper motor neuron type of palsy noted.   Eyes:          PERRLA, conjunctivae and sclerae normal, no Icterus. No pallor. Extraocular movements are within normal limits.   Ears:  Ears appear intact with no abnormalities noted.   Throat: No oral lesions, no thrush, oral mucosa moist.   Neck: Supple, trachea midline, no thyromegaly, no carotid bruit.   Back:   No kyphoscoliosis present. No tenderness to palpation,   range of motion normal.   Lungs:   Chest shape is normal. Breath sounds heard bilaterally equally.  No crackles or wheezing. No Pleural rub or bronchial breathing.   Heart:  Normal S1 and S2, no murmur, no gallop, no rub. No JVD.   Abdomen:   Normal bowel sounds, no masses, no organomegaly. Soft, non-tender, non-distended, no guarding, no rebound tenderness.  Small scar noted in the epigastric region from the previous PEG tube.    Extremities: Moves all extremities, no edema, no cyanosis, no clubbing.   Pulses: Pulses palpable and equal bilaterally.   Skin: No bleeding, bruising or rash.   Neurologic: Alert and oriented x 3.  Does have normal power in the left upper and lower extremities.  Her right upper extremity has 2+ power on the shoulder area but no power in the elbow and wrist area.  She has spasticity on the right upper extremity. No tremors     Pertinent Lab Results:     Results from last 7 days   Lab Units 09/09/20  0536 09/08/20  1322   SODIUM mmol/L 142 138   POTASSIUM mmol/L 4.1 4.2   CHLORIDE mmol/L 102 103   CO2 mmol/L 28.5 24.0   BUN mg/dL 12 15   CREATININE mg/dL 0.66 0.55*   CALCIUM mg/dL 10.2 10.2   BILIRUBIN mg/dL  --  0.4   ALK PHOS U/L  --  137*   ALT (SGPT) U/L  --  28   AST (SGOT) U/L  --  22   GLUCOSE mg/dL 97 110*     Results from last 7 days   Lab Units 09/09/20  0536 09/08/20  1322   WBC 10*3/mm3 6.27 7.88   HEMOGLOBIN g/dL 13.5 13.7   HEMATOCRIT % 40.8 40.7   PLATELETS 10*3/mm3 287 320     Results from last 7 days   Lab Units 09/09/20  0536 09/08/20  1322   INR  0.97 0.90     Results from last 7 days   Lab Units 09/08/20  1322   TROPONIN T ng/mL <0.010     TSH is 1.49    Total cholesterol 233, HDL 80, , triglycerides 84    Pertinent Radiology Results:    Imaging Results (All)     Procedure Component Value Units Date/Time    MRI Brain Without Contrast [[id number]] Collected:  09/08/20 1525     Updated:  09/08/20 1528    Narrative:       PROCEDURE: MRI BRAIN WO CONTRAST-     HISTORY: CVA?; G45.9-Transient cerebral ischemic attack, unspecified;  R47.01-Aphasia     PROCEDURE: Multiplanar multisequence imaging of the brain was performed  without the use of intravenous contrast.     COMPARISON: None.     FINDINGS: There is encephalomalacia in the left frontal lobe. There is  no mass, mass effect or midline shift. There is no hydrocephalus. There  are no areas of restricted diffusion.     The midbrain, linda,  cerebellum and craniocervical junction are  unremarkable. The sella and pituitary gland are within normal limits.  The major intracranial vasculature demonstrates the expected flow  related signal. The paranasal sinuses are clear.       Impression:       Encephalomalacia in the left frontal lobe with no acute  intracranial abnormality.     This report was finalized on 9/8/2020 3:26 PM by Malka Wood M.D..    CT Angiogram Neck [[id number]] Collected:  09/08/20 1347     Updated:  09/08/20 1349    Narrative:       PROCEDURE: CT ANGIOGRAM NECK-     HISTORY: weakness, facial droop aphasia     TECHNIQUE: Axial images were obtained from the skull vertex through the  upper chest following the administration of Isovue-300 per the CTA  protocol. Coronal and sagittal 3-D MIP images were reformatted.      FINDINGS:     CTA:      Aortic arch:  The branch vessels from the thoracic aorta are widely  patent. Incidental note is made of direct origin of the left vertebral  artery from the aortic arch.     Right carotid:  No calcified or soft plaque is seen in the right common  carotid artery. There is mild calcific and soft plaque seen in the right  carotid bulb producing an approximately 20% stenosis. There is mild  calcific plaque at the origin of the right internal carotid artery  producing an approximately 30 stenosis. No plaque is seen in the mid or  distal internal carotid artery.     Left carotid:  No plaque is seen in the left common carotid artery. Mild  calcific and soft plaque is seen in the left carotid bulb without a  significant stenosis present. There is calcified and soft plaque in the  left internal carotid artery at its origin producing an approximately  50% stenosis. No calcified or soft plaque is seen within the mid or  distal internal carotid artery     Vertebrals: No significant stenosis is present.     The constituent vessels of the [place] are patent with no areas  of significant stenosis or  abrupt cut off. There is no evidence of an  aneurysm or vascular malformation.       Impression:       1. Plaque within both carotid bulbs and the proximal ICAs without a  hemodynamically significant stenosis identified.  2. Unremarkable CT angiogram of the brain.        This study was performed with techniques to keep radiation doses as low  as reasonably achievable (ALARA). Individualized dose reduction  techniques using automated exposure control or adjustment of mA and/or  kV according to the patient size were employed.         This report was finalized on 9/8/2020 1:47 PM by Malka Wood M.D..    CT Angiogram Head [[id number]] Collected:  09/08/20 1340     Updated:  09/08/20 1349    Narrative:       PROCEDURE: CT ANGIOGRAM HEAD-     HISTORY: weakness, facial droop aphasia     TECHNIQUE: Axial images were obtained from the skull vertex through the  upper chest following the administration of Isovue-300 per the CTA  protocol. Coronal and sagittal 3-D MIP images were reformatted.      FINDINGS:     CTA:      Aortic arch:  The branch vessels from the thoracic aorta are widely  patent. Incidental note is made of direct origin of the left vertebral  artery from the aortic arch.     Right carotid:  No calcified or soft plaque is seen in the right common  carotid artery. There is mild calcific and soft plaque seen in the right  carotid bulb producing an approximately 20% stenosis. There is mild  calcific plaque at the origin of the right internal carotid artery  producing an approximately 30 stenosis. No plaque is seen in the mid or  distal internal carotid artery.     Left carotid:  No plaque is seen in the left common carotid artery. Mild  calcific and soft plaque is seen in the left carotid bulb without a  significant stenosis present. There is calcified and soft plaque in the  left internal carotid artery at its origin producing an approximately  50% stenosis. No calcified or soft plaque is seen within the  mid or  distal internal carotid artery     Vertebrals: No significant stenosis is present.     The constituent vessels of the [place] are patent with no areas  of significant stenosis or abrupt cut off. There is no evidence of an  aneurysm or vascular malformation.       Impression:       1. Plaque within both carotid bulbs and the proximal ICAs without a  hemodynamically significant stenosis identified.  2. Unremarkable CT angiogram of the brain.        This study was performed with techniques to keep radiation doses as low  as reasonably achievable (ALARA). Individualized dose reduction  techniques using automated exposure control or adjustment of mA and/or  kV according to the patient size were employed.      This report was finalized on 9/8/2020 1:46 PM by Malka Wood M.D..    CT Head Without Contrast Stroke Protocol [[id number]] Collected:  09/08/20 1330     Updated:  09/08/20 1338    Narrative:       PROCEDURE: CT HEAD WO CONTRAST STROKE PROTOCOL-     HISTORY: aphasia, weakness     COMPARISON:  None .     TECHNIQUE: Multiple axial CT images were performed from the foramen  magnum to the vertex without enhancement.      FINDINGS: There is an area of encephalomalacia in the left frontal lobe  underlying a craniotomy site. There is no CT evidence of acute  hemorrhage. There is no mass, mass effect or midline shift.  There is no  hydrocephalus. The paranasal sinuses are clear. Bone windows reveal no  acute osseous abnormalities.       Impression:       No acute intracranial process.      This study was performed with techniques to keep radiation doses as low  as reasonably achievable (ALARA). Individualized dose reduction  techniques using automated exposure control or adjustment of mA and/or  kV according to the patient size were employed.      This report was finalized on 9/8/2020 1:31 PM by Malka Wood M.D..        Condition on Discharge:      Stable.    Code status during the hospital  stay:    Code Status and Medical Interventions:   Ordered at: 09/08/20 1709     Level Of Support Discussed With:    Patient     Code Status:    CPR     Medical Interventions (Level of Support Prior to Arrest):    Full     Discharge Disposition:    Home or Self Care    Discharge Medications:       Discharge Medications      New Medications      Instructions Start Date   atorvastatin 40 MG tablet  Commonly known as:  Lipitor   40 mg, Oral, Nightly      levETIRAcetam 500 MG tablet  Commonly known as:  KEPPRA   500 mg, Oral, Every 12 Hours Scheduled         Continue These Medications      Instructions Start Date   acetaminophen 325 MG tablet  Commonly known as:  TYLENOL   650 mg, Oral, Every 6 Hours PRN      carvedilol 12.5 MG tablet  Commonly known as:  COREG   12.5 mg, Oral, 2 Times Daily With Meals      doxylamine 25 MG tablet  Commonly known as:  UNISOM   50 mg, Oral, Nightly PRN      gabapentin 300 MG capsule  Commonly known as:  NEURONTIN   300 mg, Oral, 3 Times Daily      pantoprazole 20 MG EC tablet  Commonly known as:  PROTONIX   20 mg, Oral, Daily           Discharge Diet:     Diet Instructions     Diet: Cardiac; Thin      Discharge Diet:  Cardiac    Fluid Consistency:  Thin        Activity at Discharge:     Activity Instructions     Activity as Tolerated          Follow-up Appointments:    Follow-up Information     Meme Padilla MD Follow up in 2 week(s).    Specialty:  Neurology  Contact information:  [address] [facility] [address]  [facility] [address] [phone]             Lula Marlow, DO Follow up in 1 week(s).    Specialty:  Family Medicine  Contact information:  [address] [facility] [address] [phone]                 Test Results Pending at Discharge:    None.       Otis Díaz MD  09/09/20  12:16    Time: I spent 25 minutes on this discharge activity which included: face-to-face encounter with the patient, reviewing the data in the system, coordination of the care with  the nursing staff as well as consultants, documentation, and entering orders.     Dictated utilizing Dragon dictation.

## 2020-09-09 NOTE — PLAN OF CARE
Problem: Patient Care Overview  Goal: Plan of Care Review  9/9/2020 1254 by Izzy Hatch PT  Outcome: Ongoing (interventions implemented as appropriate)  Flowsheets (Taken 9/9/2020 0872)  Outcome Summary: Patient participates well in skilled PT  evaluation and demonstrates decreased strength, activity tolerance and gait.  She reports feeling weaker and more easily fatigued at this time.  She is expected to benefit from additional PT services while hospitalized.

## 2020-09-09 NOTE — THERAPY EVALUATION
Patient Name: Yee Goodwin  : 1970    MRN: [id number]                              Today's Date: 2020       Admit Date: 2020    Visit Dx:     ICD-10-CM ICD-9-CM   1. TIA (transient ischemic attack) G45.9 435.9   2. Aphasia R47.01 784.3   3. Impaired mobility and ADLs Z74.09 V49.89    Z78.9      Patient Active Problem List   Diagnosis   • History of right hip replacement   • PUD (peptic ulcer disease)   • S/P madan   • Essential hypertension   • Gastritis   • Epigastric pain   • Diarrhea   • Diverticulosis   • Hiatal hernia   • Tobacco abuse   • Acute L frontal ICH 20   • Elevated transaminase levels   • Dyslipidemia   • Acute intracerebral hemorrhage (CMS/HCC)   • S/P L frontal craniotomy and evacuation of intraparenchymal hematoma 20   • Hemiparesis of right dominant side as late effect of nontraumatic intracerebral hemorrhage (CMS/HCC)   • Focal seizure (CMS/HCC)     Past Medical History:   Diagnosis Date   • Arthritis    • Diverticulitis    • GERD (gastroesophageal reflux disease)    • Hypertension    • Impaired functional mobility, balance, gait, and endurance    • Stroke (CMS/HCC)      Past Surgical History:   Procedure Laterality Date   • CEREBRAL ANGIOGRAM N/A 2020    Procedure: Cerebral angiogram;  Surgeon: Merlin Black MD;  Location:  [facility]VASIVE [facility];  Service: Interventional Radiology   • CHOLECYSTECTOMY     • COLONOSCOPY     • CRANIOTOMY FOR SUBDURAL HEMATOMA Left 2020    Procedure: FRONTAL CRANIOTOMY FOR EVACUATION OF INTRAPARENCHYMAL HEMATOMA  LEFT;  Surgeon: Jose Cole MD;  Location:  [facility] OR;  Service: Neurosurgery;  Laterality: Left;   • ENDOSCOPY W/ PEG TUBE PLACEMENT N/A 2/3/2020    Procedure: ESOPHAGOGASTRODUODENOSCOPY WITH PERCUTANEOUS ENDOSCOPIC GASTROSTOMY TUBE INSERTION AT BEDSIDE;  Surgeon: Andrew Young MD;  Location:  [facility] ENDOSCOPY;  Service: General;  Laterality: N/A;   • HAND TENDON SURGERY Right     patient  hand right thumb tendon surgery   • HIP SURGERY     • JOINT REPLACEMENT     • TUBAL ABDOMINAL LIGATION       General Information     Row Name 09/09/20 1004          PT Evaluation Time/Intention    Document Type  evaluation  -JR     Mode of Treatment  physical therapy  -     Row Name 09/09/20 1004          General Information    Patient Profile Reviewed?  yes  -JR     Prior Level of Function  independent:;all household mobility  -JR     Existing Precautions/Restrictions  fall  -JR     Barriers to Rehab  none identified  -JR     Row Name 09/09/20 1004          Relationship/Environment    Lives With  significant other  -JR     Row Name 09/09/20 1004          Resource/Environmental Concerns    Current Living Arrangements  home/apartment/condo  -     Row Name 09/09/20 1004          Home Main Entrance    Number of Stairs, Main Entrance  three  -JR     Stair Railings, Main Entrance  none  -JR     Row Name 09/09/20 1004          Stairs Within Home, Primary    Number of Stairs, Within Home, Primary  none  -JR     Row Name 09/09/20 1004          Cognitive Assessment/Intervention- PT/OT    Orientation Status (Cognition)  oriented x 4  -JR     Personal Safety Interventions  fall prevention program maintained;nonskid shoes/slippers when out of bed;gait belt  -     Row Name 09/09/20 1004          Safety Issues, Functional Mobility    Safety Issues Affecting Function (Mobility)  safety precautions follow-through/compliance  -JR     Impairments Affecting Function (Mobility)  endurance/activity tolerance;strength  -JR       User Key  (r) = Recorded By, (t) = Taken By, (c) = Cosigned By    Initials Name Provider Type    JR Izzy Hatch, PT Physical Therapist        Mobility     Row Name 09/09/20 1004          Bed Mobility Assessment/Treatment    Bed Mobility Assessment/Treatment  supine-sit  -JR     Supine-Sit [place] (Bed Mobility)  conditional independence  -JR     Assistive Device (Bed Mobility)  head of bed elevated   -     Row Name 09/09/20 1004          Sit-Stand Transfer    Sit-Stand [place] (Transfers)  stand by assist  -     Assistive Device (Sit-Stand Transfers)  walker, front-wheeled  -     Row Name 09/09/20 1004          Gait/Stairs Assessment/Training    Gait/Stairs Assessment/Training  gait/ambulation assistive device  -     [place] Level (Gait)  contact guard  -     Assistive Device (Gait)  walker, front-wheeled elvin-walker placed in room  -     Distance in Feet (Gait)  12  -JR     Pattern (Gait)  step-to  -JR     Deviations/Abnormal Patterns (Gait)  steppage  -JR     Right Sided Gait Deviations  heel strike decreased;foot drop/toe drag steppage due to foot drop  -       User Key  (r) = Recorded By, (t) = Taken By, (c) = Cosigned By    Initials Name Provider Type    Izzy Newman, PT Physical Therapist        Obj/Interventions     Row Name 09/09/20 1004          General ROM    GENERAL ROM COMMENTS  Right hemiparesis, PROM is WFL, ankle does not have any AROM  -[facility] Name 09/09/20 1004          MMT (Manual Muscle Testing)    General MMT Comments  Right ankle=0/5, knee and hip 3/5 with poor coordination  -     Row Name 09/09/20 1004          Static Sitting Balance    Level of [place] (Unsupported Sitting, Static Balance)  independent  -     Sitting Position (Unsupported Sitting, Static Balance)  sitting on edge of bed  -     Time Able to Maintain Position (Unsupported Sitting, Static Balance)  2 to 3 minutes  -     Row Name 09/09/20 1004          Dynamic Sitting Balance    Level of [place], Reaches Outside Midline (Sitting, Dynamic Balance)  conditional independence  -     Sitting Position, Reaches Outside Midline (Sitting, Dynamic Balance)  sitting on edge of bed  -     Row Name 09/09/20 1004          Static Standing Balance    Level of [place] (Supported Standing, Static Balance)  supervision  -     Time Able to Maintain Position (Supported Standing, Static  Balance)  1 to 2 minutes  -JR     Assistive Device Utilized (Supported Standing, Static Balance)  walker, rolling will use elvin-walker in future  -JR     Row Name 09/09/20 1004          Dynamic Standing Balance    Level of [place], Reaches Outside Midline (Standing, Dynamic Balance)  contact guard assist  -JR     Time Able to Maintain Position, Reaches Outside Midline (Standing, Dynamic Balance)  2 to 3 minutes  -JR     Assistive Device Utilized (Supported Standing, Dynamic Balance)  walker, rolling will use elvin-walker in future  -JR       User Key  (r) = Recorded By, (t) = Taken By, (c) = Cosigned By    Initials Name Provider Type    Izzy Newman, PT Physical Therapist        Goals/Plan     Row Name 09/09/20 0850          Transfer Goal 1 (PT)    Activity/Assistive Device (Transfer Goal 1, PT)  sit-to-stand/stand-to-sit;bed-to-chair/chair-to-bed  -JR     [place] Level/Cues Needed (Transfer Goal 1, PT)  conditional independence  -JR     Time Frame (Transfer Goal 1, PT)  5 days  -JR     Progress/Outcome (Transfer Goal 1, PT)  goal ongoing  -JR     Row Name 09/09/20 0850          Gait Training Goal 1 (PT)    Activity/Assistive Device (Gait Training Goal 1, PT)  gait (walking locomotion);assistive device use;walker, elvin  -JR     [place] Level (Gait Training Goal 1, PT)  supervision required  -JR     Distance (Gait Goal 1, PT)  400  -JR     Time Frame (Gait Training Goal 1, PT)  1 week  -JR     Progress/Outcome (Gait Training Goal 1, PT)  goal ongoing  -JR     Row Name 09/09/20 0850          Patient Education Goal (PT)    Activity (Patient Education Goal, PT)  Perform HEP x 20 reps  -JR     [place]/Cues/Accuracy (Memory Goal 2, PT)  demonstrates adequately  -JR     Time Frame (Patient Education Goal, PT)  4 days  -JR     Progress/Outcome (Patient Education Goal, PT)  goal ongoing  -JR       User Key  (r) = Recorded By, (t) = Taken By, (c) = Cosigned By    Initials Name Provider Type    JR Hatch  Izzy, PT Physical Therapist        Clinical Impression     Row Name 09/09/20 0850          Pain Assessment    Additional Documentation  Pain Scale: Numbers Pre/Post-Treatment (Group)  -     Row Name 09/09/20 0850          Pain Scale: Numbers Pre/Post-Treatment    Pain Scale: Numbers, Pretreatment  0/10 - no pain  -     Pain Scale: Numbers, Post-Treatment  0/10 - no pain  -JR     Row Name 09/09/20 0850          Plan of Care Review    Plan of Care Reviewed With  patient  -JR     Row Name 09/09/20 0850          Physical Therapy Clinical Impression    Patient/Family Goals Statement (PT Clinical Impression)  Patient is eager to go home  -JR     Criteria for Skilled Interventions Met (PT Clinical Impression)  yes;treatment indicated  -JR     Rehab Potential (PT Clinical Summary)  good, to achieve stated therapy goals  -JR     Row Name 09/09/20 0850          Vital Signs    Pre Patient Position  Supine  -JR     Intra Patient Position  Standing  -JR     Post Patient Position  Sitting  -     Row Name 09/09/20 0850          Positioning and Restraints    Pre-Treatment Position  in bed  -JR     Post Treatment Position  chair  -JR     In Chair  sitting;call light within reach;encouraged to call for assist  -JR       User Key  (r) = Recorded By, (t) = Taken By, (c) = Cosigned By    Initials Name Provider Type    JR Izzy Hatch, PT Physical Therapist        Outcome Measures     Row Name 09/09/20 1004          How much help from another person do you currently need...    Turning from your back to your side while in flat bed without using bedrails?  4  -JR     Moving from lying on back to sitting on the side of a flat bed without bedrails?  4  -JR     Moving to and from a bed to a chair (including a wheelchair)?  3  -JR     Standing up from a chair using your arms (e.g., wheelchair, bedside chair)?  3  -JR     Climbing 3-5 steps with a railing?  3  -JR     To walk in hospital room?  3  -JR     AM-PAC 6 Clicks Score (PT)  20   -     Row Name 09/09/20 1004          Functional Assessment    Outcome Measure Options  AM-PAC 6 Clicks Basic Mobility (PT)  -       User Key  (r) = Recorded By, (t) = Taken By, (c) = Cosigned By    Initials Name Provider Type    Izzy Newman, PT Physical Therapist        Physical Therapy Education                 Title: PT OT SLP Therapies (In Progress)     Topic: Physical Therapy (In Progress)     Point: Mobility training (Done)     Description:   Instruct learner(s) on safety and technique for assisting patient out of bed, chair or wheelchair.  Instruct in the proper use of assistive devices, such as walker, crutches, cane or brace.              Patient Friendly Description:   It's important to get you on your feet again, but we need to do so in a way that is safe for you. Falling has serious consequences, and your personal safety is the most important thing of all.        When it's time to get out of bed, one of us or a family member will sit next to you on the bed to give you support.     If your doctor or nurse tells you to use a walker, crutches, a cane, or a brace, be sure you use it every time you get out of bed, even if you think you don't need it.    Learning Progress Summary           Patient Acceptance, E,TB, VU by  at 9/9/2020 1252    Comment:  Role of PT                   Point: Home exercise program (Not Started)     Description:   Instruct learner(s) on appropriate technique for monitoring, assisting and/or progressing patient with therapeutic exercises and activities.              Learner Progress:   Not documented in this visit.          Point: Body mechanics (Not Started)     Description:   Instruct learner(s) on proper positioning and spine alignment for patient and/or caregiver during mobility tasks and/or exercises.              Learner Progress:   Not documented in this visit.          Point: Precautions (Not Started)     Description:   Instruct learner(s) on prescribed precautions  during mobility and gait tasks              Learner Progress:   Not documented in this visit.                      User Key     Initials Effective Dates Name Provider Type Discipline     04/03/18 -  Izzy Hatch, PT Physical Therapist PT              PT Recommendation and Plan  Planned Therapy Interventions (PT Eval): strengthening, balance training, transfer training, gait training, home exercise program, patient/family education  Outcome Summary/Treatment Plan (PT)  Anticipated Discharge Disposition (PT): home  Plan of Care Reviewed With: patient  Outcome Summary: Patient participates well in skilled PT  evaluation and demonstrates decreased strength, activity tolerance and gait.  She reports feeling weaker and more easily fatigued at this time.  She is expected to benefit from additional PT services while hospitalized.     Time Calculation:   PT Charges     Row Name 09/09/20 1255             Time Calculation    Start Time  1004  -JR      PT Received On  09/09/20  -      PT Goal Re-Cert Due Date  09/19/20  -        User Key  (r) = Recorded By, (t) = Taken By, (c) = Cosigned By    Initials Name Provider Type     Izzy Hatch, TOM Physical Therapist        Therapy Charges for Today     Code Description Service Date Service Provider Modifiers Qty    02492519253 HC PT EVAL LOW COMPLEXITY 4 9/9/2020 Izzy Hatch, PT GP 1          PT G-Codes  Outcome Measure Options: AM-PAC 6 Clicks Basic Mobility (PT)  AM-PAC 6 Clicks Score (PT): 20  AM-PAC 6 Clicks Score (OT): 22    Izzy Hatch PT  9/9/2020

## 2020-09-09 NOTE — PLAN OF CARE
Problem: Patient Care Overview  Goal: Plan of Care Review  Outcome: Ongoing (interventions implemented as appropriate)  Flowsheets  Taken 9/9/2020 1152  Progress: improving  Plan of Care Reviewed With: patient  Taken 9/9/2020 3772  Outcome Summary: OT eval completed. Patient completed bed mobility with conditional independence, transfers with SBA, walked around bed using RW with CGA d/t insufficient  strength of R hand to hold to walker. Patient requires S/U-sup for ADLs and reports is at her baseline functional performance for self care, no further OT needs.

## 2020-09-10 NOTE — PROGRESS NOTES
Case Management Discharge Note           Provided Post Acute Provider List?: N/A  N/A Provider List Comment: No DME, HH, or Skilled nursing needs            Transportation Services  Private: Car    Final Discharge Disposition Code: 01 - home or self-care

## 2020-10-30 ENCOUNTER — OFFICE VISIT (OUTPATIENT)
Dept: NEUROLOGY | Facility: CLINIC | Age: 50
End: 2020-10-30

## 2020-10-30 VITALS
HEIGHT: 65 IN | TEMPERATURE: 97.3 F | SYSTOLIC BLOOD PRESSURE: 116 MMHG | DIASTOLIC BLOOD PRESSURE: 88 MMHG | OXYGEN SATURATION: 98 % | WEIGHT: 177.6 LBS | BODY MASS INDEX: 29.59 KG/M2 | HEART RATE: 90 BPM

## 2020-10-30 DIAGNOSIS — R56.9 FOCAL SEIZURE (HCC): Primary | ICD-10-CM

## 2020-10-30 PROCEDURE — 99215 OFFICE O/P EST HI 40 MIN: CPT | Performed by: PSYCHIATRY & NEUROLOGY

## 2020-10-30 RX ORDER — LEVETIRACETAM 500 MG/1
500 TABLET ORAL EVERY 12 HOURS SCHEDULED
Qty: 60 TABLET | Refills: 5 | Status: SHIPPED | OUTPATIENT
Start: 2020-10-30 | End: 2021-01-08 | Stop reason: SDUPTHER

## 2020-10-30 NOTE — PROGRESS NOTES
"Subjective:    CC: Yee Goodwin is seen today as a referral from the hospital for Seizures       HPI:  50-year-old female accompanied by her  with a history of hypertension, left frontal intracerebral hemorrhage status post craniotomy, GERD presents with a stroke.  As per patient she had a left intracerebral hemorrhage in January with intraventricular extension that was attributed to her uncontrolled hypertension.  After that patient denied having any seizures but on September 8 she had an episode of inability to speak with just repeating \"okay\" over and over again.  She also notes that she had right arm tingling but no jerking.  Was brought to the hospital where she had a CT scan of the head and MRI brain that again showed a left frontal encephalomalacia but no acute abnormalities.  CT angiogram of the head and neck showed plaques within bilateral carotid bulbs and proximal ICAs but no significant stenosis.  EEG showed left frontal slowing with breach rhythm but no epileptiform activity.  Patient was started on Keppra 500 mg twice a day and has not had any more episodes since then.  She is able to tolerate the Keppra well other than mild somnolence.  Was also started on Lipitor as her LDL was 136.  She states that her blood pressure is much better controlled now.  Denies any febrile seizures as a child but there is a family history of seizures in her mother.  Of note-I personally reviewed her MRI brain and 's note from the hospital    The following portions of the patient's history were reviewed today and updated as of 10/30/2020  : allergies, current medications, past family history, past medical history, past social history, past surgical history and problem list  These document will be scanned to patient's chart.      Current Outpatient Medications:   •  acetaminophen (TYLENOL) 325 MG tablet, Take 2 tablets by mouth Every 6 (Six) Hours As Needed for Mild Pain ., Disp: , Rfl:   •  " atorvastatin (Lipitor) 40 MG tablet, Take 1 tablet by mouth Every Night., Disp: 30 tablet, Rfl: 0  •  carvedilol (COREG) 12.5 MG tablet, Take 12.5 mg by mouth 2 (Two) Times a Day With Meals., Disp: , Rfl:   •  doxylamine (UNISOM) 25 MG tablet, Take 50 mg by mouth At Night As Needed for Sleep., Disp: , Rfl:   •  gabapentin (NEURONTIN) 300 MG capsule, Take 300 mg by mouth 3 (Three) Times a Day., Disp: , Rfl:   •  levETIRAcetam (KEPPRA) 500 MG tablet, Take 1 tablet by mouth Every 12 (Twelve) Hours., Disp: 60 tablet, Rfl: 5  •  pantoprazole (PROTONIX) 20 MG EC tablet, Take 20 mg by mouth Daily., Disp: , Rfl:    Past Medical History:   Diagnosis Date   • Arthritis    • Diverticulitis    • GERD (gastroesophageal reflux disease)    • Hypertension    • Impaired functional mobility, balance, gait, and endurance    • Seizures (CMS/HCC)    • Stroke (CMS/HCC)       Past Surgical History:   Procedure Laterality Date   • CEREBRAL ANGIOGRAM N/A 1/29/2020    Procedure: Cerebral angiogram;  Surgeon: Merlin Black MD;  Location:  [facility];  Service: Interventional Radiology   • CHOLECYSTECTOMY     • COLONOSCOPY  2015   • CRANIOTOMY FOR SUBDURAL HEMATOMA Left 1/30/2020    Procedure: FRONTAL CRANIOTOMY FOR EVACUATION OF INTRAPARENCHYMAL HEMATOMA  LEFT;  Surgeon: Jose Cole MD;  Location:  [facility] OR;  Service: Neurosurgery;  Laterality: Left;   • ENDOSCOPY W/ PEG TUBE PLACEMENT N/A 2/3/2020    Procedure: ESOPHAGOGASTRODUODENOSCOPY WITH PERCUTANEOUS ENDOSCOPIC GASTROSTOMY TUBE INSERTION AT BEDSIDE;  Surgeon: Andrew Young MD;  Location:  [facility] ENDOSCOPY;  Service: General;  Laterality: N/A;   • HAND TENDON SURGERY Right     patient hand right thumb tendon surgery   • HIP SURGERY     • JOINT REPLACEMENT     • TUBAL ABDOMINAL LIGATION        Family History   Problem Relation Age of Onset   • Arthritis Mother    • Hypertension Mother    • Stroke Mother    • Arthritis Father    • Cancer Father    •  "Lung cancer Father    • Throat cancer Father    • Heart disease Sister    • Pulmonary embolism Brother       Social History     Socioeconomic History   • Marital status:      Spouse name: Not on file   • Number of children: Not on file   • Years of education: Not on file   • Highest education level: Not on file   Tobacco Use   • Smoking status: Former Smoker     Packs/day: 0.50     Years: 25.00     Pack years: 12.50     Types: Cigarettes     Quit date: 2019     Years since quittin.4   • Smokeless tobacco: Never Used   Substance and Sexual Activity   • Alcohol use: Yes     Alcohol/week: 3.0 standard drinks     Types: 3 Glasses of wine per week     Comment: 1 glass of wine every 2-3 days   • Drug use: No   • Sexual activity: Defer     Review of Systems    Objective:    /88   Pulse 90   Temp 97.3 °F (36.3 °C)   Ht 165.1 cm (65\")   Wt 80.6 kg (177 lb 9.6 oz)   SpO2 98%   BMI 29.55 kg/m²     Neurology Exam:    General apperance: NAD.     Mental status: Alert, awake and oriented to time place and person.    Recent and Remote memory: Intact.    Attention span and Concentration: Normal.     Language and Speech: Intact- No dysarthria but slightly halting speech.    Fluency, Naming , Repitition and Comprehension:  Intact    Cranial Nerves:   CN II: Visual fields are full. Intact. Fundi - Normal, No papillederma, Pupils - BENJIE  CN III, IV and VI: Extraocular movements are intact. Normal saccades.   CN V: Facial sensation is intact.   CN VII: Muscles of facial expression reveal no asymmetry. Intact.   CN VIII: Hearing is intact. Whispered voice intact.   CN IX and X: Palate elevates symmetrically. Intact  CN XI: Shoulder shrug is intact.   CN XII: Tongue is midline without evidence of atrophy or fasciculation.     Ophthalmoscopic exam of optic disc-normal    Motor:  Right UE muscle strength 2/5 with mildly increased tone    Left UE muscle strength 5/5. Normal tone.      Right LE muscle strength4/5 " proximally, 3/5 onKF, 1/5 on PF. Normal tone.     Left LE muscle strength 5/5. Normal tone.      Sensory: Normal light touch, vibration and pinprick sensation bilaterally.    DTRs: 2+ bilaterally in upper and lower extremities.    Babinski: Negative bilaterally.    Co-ordination: Normal finger-to-nose, heel to shin B/L.    Rhomberg: Negative.    Gait: High steppage gait.  Uses a hemiwalker    Cardiovascular: Regular rate and rhythm without murmur, gallop or rub.    Assessment and Plan:  1. Focal seizure (CMS/HCC)  Patient most likely had a focal seizure from the area of her left frontal encephalomalacia  I have asked her to continue Keppra 500 mg twice a day  She is currently also on gabapentin 300 mg 3 times daily for poststroke pain  Counseled on seizure precautions       Return in about 3 months (around 1/30/2021).     I spent over 45 minutes with the patient face to face out of which over 50% (30 minutes) was spent in management, instructions and education.     Meme Padilla MD

## 2021-01-08 DIAGNOSIS — R56.9 FOCAL SEIZURE (HCC): Primary | ICD-10-CM

## 2021-01-08 RX ORDER — LEVETIRACETAM 500 MG/1
500 TABLET ORAL EVERY 12 HOURS SCHEDULED
Qty: 60 TABLET | Refills: 5 | Status: SHIPPED | OUTPATIENT
Start: 2021-01-08 | End: 2021-02-05 | Stop reason: SDUPTHER

## 2021-02-05 ENCOUNTER — OFFICE VISIT (OUTPATIENT)
Dept: NEUROLOGY | Facility: CLINIC | Age: 51
End: 2021-02-05

## 2021-02-05 VITALS
OXYGEN SATURATION: 95 % | BODY MASS INDEX: 30.48 KG/M2 | SYSTOLIC BLOOD PRESSURE: 128 MMHG | HEART RATE: 86 BPM | TEMPERATURE: 97.1 F | HEIGHT: 64 IN | DIASTOLIC BLOOD PRESSURE: 82 MMHG

## 2021-02-05 DIAGNOSIS — G89.0 CENTRAL PAIN SYNDROME: ICD-10-CM

## 2021-02-05 DIAGNOSIS — I69.151 HEMIPARESIS OF RIGHT DOMINANT SIDE AS LATE EFFECT OF NONTRAUMATIC INTRACEREBRAL HEMORRHAGE (HCC): ICD-10-CM

## 2021-02-05 DIAGNOSIS — R56.9 FOCAL SEIZURE (HCC): Primary | ICD-10-CM

## 2021-02-05 PROCEDURE — 99214 OFFICE O/P EST MOD 30 MIN: CPT | Performed by: PSYCHIATRY & NEUROLOGY

## 2021-02-05 RX ORDER — LEVETIRACETAM 500 MG/1
500 TABLET ORAL EVERY 12 HOURS SCHEDULED
Qty: 60 TABLET | Refills: 5 | Status: SHIPPED | OUTPATIENT
Start: 2021-02-05 | End: 2021-03-12 | Stop reason: SDUPTHER

## 2021-02-05 NOTE — PROGRESS NOTES
"Subjective:    CC: Yee Goodwin is seen today  for Seizures       HPI:  Current visit-patient states she has not had any seizures since her last visit.  In fact her first and only seizure was in September of last year.  She continues to take Keppra 500 mg twice a day without any adverse effects.  Her blood pressure is well controlled now.  Patient continues to have some pain in her shoulder despite taking gabapentin 300 mg 3 times a day.  She also continues to have profound weakness in her right arm more than leg.  Stopped getting PT last year as her insurance would not pay for it.    Initial [age]joj-14-bjm[age]-old female accompanied by her  with a history of hypertension, left frontal intracerebral hemorrhage status post craniotomy, GERD presents with a stroke.  As per patient she had a left intracerebral hemorrhage in January with intraventricular extension that was attributed to her uncontrolled hypertension.  After that patient denied having any seizures but on September 8 she had an episode of inability to speak with just repeating \"okay\" over and over again.  She also notes that she had right arm tingling but no jerking.  Was brought to the hospital where she had a CT scan of the head and MRI brain that again showed a left frontal encephalomalacia but no acute abnormalities.  CT angiogram of the head and neck showed plaques within bilateral carotid bulbs and proximal ICAs but no significant stenosis.  EEG showed left frontal slowing with breach rhythm but no epileptiform activity.  Patient was started on Keppra 500 mg twice a day and has not had any more episodes since then.  She is able to tolerate the Keppra well other than mild somnolence.  Was also started on Lipitor as her LDL was 136.  She states that her blood pressure is much better controlled now.  Denies any febrile seizures as a child but there is a family history of seizures in her mother.  Of note-I personally reviewed her MRI brain and " 's note from the hospital    The following portions of the patient's history were reviewed today and updated as of 10/30/2020  : allergies, current medications, past family history, past medical history, past social history, past surgical history and problem list  These document will be scanned to patient's chart.      Current Outpatient Medications:   •  acetaminophen (TYLENOL) 325 MG tablet, Take 2 tablets by mouth Every 6 (Six) Hours As Needed for Mild Pain ., Disp: , Rfl:   •  atorvastatin (Lipitor) 40 MG tablet, Take 1 tablet by mouth Every Night., Disp: 30 tablet, Rfl: 0  •  carvedilol (COREG) 12.5 MG tablet, Take 12.5 mg by mouth 2 (Two) Times a Day With Meals., Disp: , Rfl:   •  doxylamine (UNISOM) 25 MG tablet, Take 50 mg by mouth At Night As Needed for Sleep., Disp: , Rfl:   •  gabapentin (NEURONTIN) 300 MG capsule, Take 300 mg by mouth 3 (Three) Times a Day., Disp: , Rfl:   •  levETIRAcetam (KEPPRA) 500 MG tablet, Take 1 tablet by mouth Every 12 (Twelve) Hours., Disp: 60 tablet, Rfl: 5  •  pantoprazole (PROTONIX) 20 MG EC tablet, Take 20 mg by mouth Daily., Disp: , Rfl:    Past Medical History:   Diagnosis Date   • Arthritis    • Diverticulitis    • GERD (gastroesophageal reflux disease)    • Hypertension    • Impaired functional mobility, balance, gait, and endurance    • Seizures (CMS/HCC)    • Stroke (CMS/HCC)       Past Surgical History:   Procedure Laterality Date   • CEREBRAL ANGIOGRAM N/A 1/29/2020    Procedure: Cerebral angiogram;  Surgeon: Merlin Black MD;  Location:  [facility];  Service: Interventional Radiology   • CHOLECYSTECTOMY     • COLONOSCOPY  2015   • CRANIOTOMY FOR SUBDURAL HEMATOMA Left 1/30/2020    Procedure: FRONTAL CRANIOTOMY FOR EVACUATION OF INTRAPARENCHYMAL HEMATOMA  LEFT;  Surgeon: Jose Cole MD;  Location: [facility] OR;  Service: Neurosurgery;  Laterality: Left;   • ENDOSCOPY W/ PEG TUBE PLACEMENT N/A 2/3/2020    Procedure:  "ESOPHAGOGASTRODUODENOSCOPY WITH PERCUTANEOUS ENDOSCOPIC GASTROSTOMY TUBE INSERTION AT BEDSIDE;  Surgeon: Andrew Young MD;  Location: [facility] ENDOSCOPY;  Service: General;  Laterality: N/A;   • HAND TENDON SURGERY Right     patient hand right thumb tendon surgery   • HIP SURGERY     • JOINT REPLACEMENT     • TUBAL ABDOMINAL LIGATION        Family History   Problem Relation Age of Onset   • Arthritis Mother    • Hypertension Mother    • Stroke Mother    • Arthritis Father    • Cancer Father    • Lung cancer Father    • Throat cancer Father    • Heart disease Sister    • Pulmonary embolism Brother       Social History     Socioeconomic History   • Marital status:      Spouse name: Not on file   • Number of children: Not on file   • Years of education: Not on file   • Highest education level: Not on file   Tobacco Use   • Smoking status: Former Smoker     Packs/day: 0.50     Years: 25.00     Pack years: 12.50     Types: Cigarettes     Quit date: 2019     Years since quittin.6   • Smokeless tobacco: Never Used   Substance and Sexual Activity   • Alcohol use: Yes     Alcohol/week: 3.0 standard drinks     Types: 3 Glasses of wine per week     Comment: 1 glass of wine every 2-3 days   • Drug use: No   • Sexual activity: Defer     Review of Systems   Musculoskeletal: Positive for arthralgias and gait problem.   Neurological: Positive for weakness.   All other systems reviewed and are negative.      Objective:    /82   Pulse 86   Temp 97.1 °F (36.2 °C)   Ht 162.6 cm (64\")   SpO2 95%   BMI 30.48 kg/m²     Neurology Exam:    General apperance: NAD.     Mental status: Alert, awake and oriented to time place and person.  Could tell me the date, month and her full address    Recent and Remote memory: Intact.    Attention span and Concentration: Normal.     Language and Speech: Intact- No dysarthria but slightly halting speech.    Fluency, Naming , Repitition and Comprehension:  Intact    Cranial " Nerves:   CN II: Visual fields are full. Intact. Fundi - Normal, No papillederma, Pupils - BENJIE  CN III, IV and VI: Extraocular movements are intact. Normal saccades.   CN V: Facial sensation is intact.   CN VII: Muscles of facial expression reveal no asymmetry. Intact.   CN VIII: Hearing is intact. Whispered voice intact.   CN IX and X: Palate elevates symmetrically. Intact  CN XI: Shoulder shrug is intact.   CN XII: Tongue is midline without evidence of atrophy or fasciculation.     Ophthalmoscopic exam of optic disc-normal    Motor:  Right UE muscle strength 2/5 with mildly increased tone    Left UE muscle strength 5/5. Normal tone.      Right LE muscle strength4/5 proximally, 3/5 onKF, 3/5 on PF,1/5 DF. Normal tone.     Left LE muscle strength 5/5. Normal tone.      Sensory: Normal light touch, vibration and pinprick sensation bilaterally.    DTRs: 2+ bilaterally in upper and lower extremities.    Babinski: Negative bilaterally.    Co-ordination: Normal finger-to-nose, heel to shin B/L.    Rhomberg: Negative.    Gait: High steppage gait.  Uses a hemiwalker    Cardiovascular: Regular rate and rhythm without murmur, gallop or rub.    Assessment and Plan:  1. Focal seizure (CMS/HCC)  Patient most likely had a focal seizure from the area of her left frontal encephalomalacia  I have asked her to continue Keppra 500 mg twice a day    2.  Central Pain syndrome  She is currently also on gabapentin 300 mg 3 times daily for poststroke pain  Her shoulder pain is most likely due to arthritis.  I have told her to to speak to her PCP about getting her MRI of the shoulder    3.  Right-sided weakness  I will give her a PT referral  I have also given her prescription for an AFO for her foot drop         Return in about 6 months (around 8/5/2021).         Meme Padilla MD

## 2021-02-10 ENCOUNTER — TELEPHONE (OUTPATIENT)
Dept: NEUROLOGY | Facility: CLINIC | Age: 51
End: 2021-02-10

## 2021-02-10 NOTE — TELEPHONE ENCOUNTER
Provider:     Caller: NICHOLAS    Relationship to Patient: SPOUSE    Pharmacy: NA    Phone Number: [phone]    Reason for Call: PT'S  CALLED AND STATED HE IS TRYING TO FAX OVER DISABILITY PAPERWORK AND CAN'T SEEM TO GET IT TO GO THROUGH. IS THERE ANY OTHER WAY HE COULD GET THIS PAPERWORK TO ? PLEASE ADVISE.     When was the patient last seen: 2-5-21

## 2021-02-26 ENCOUNTER — TELEPHONE (OUTPATIENT)
Dept: NEUROLOGY | Facility: CLINIC | Age: 51
End: 2021-02-26

## 2021-03-05 ENCOUNTER — TREATMENT (OUTPATIENT)
Dept: PHYSICAL THERAPY | Facility: CLINIC | Age: 51
End: 2021-03-05

## 2021-03-05 DIAGNOSIS — I69.151 HEMIPARESIS OF RIGHT DOMINANT SIDE AS LATE EFFECT OF NONTRAUMATIC INTRACEREBRAL HEMORRHAGE (HCC): Primary | ICD-10-CM

## 2021-03-05 PROCEDURE — 97530 THERAPEUTIC ACTIVITIES: CPT | Performed by: PHYSICAL THERAPIST

## 2021-03-05 PROCEDURE — 97161 PT EVAL LOW COMPLEX 20 MIN: CPT | Performed by: PHYSICAL THERAPIST

## 2021-03-05 PROCEDURE — 97112 NEUROMUSCULAR REEDUCATION: CPT | Performed by: PHYSICAL THERAPIST

## 2021-03-05 NOTE — PROGRESS NOTES
Physical Therapy Initial Evaluation and Plan of Care      Patient: Yee Goodwin   : 1970  Diagnosis/ICD-10 Code:  Hemiparesis of right dominant side as late effect of nontraumatic intracerebral hemorrhage (CMS/HCC) [I69.151]  Referring practitioner: Meme Padilla MD    Subjective Evaluation    History of Present Illness  Mechanism of injury: Pt reports CVA 2020.  15 days in ICU.  [place] 35 days.      HHPT 10 weeks.     [facility] OPPT for 2 months in 2020.  Pt has noticed a decline in her hand function but her walking may be getting better.      Pt uses Hemiwalker.  She has no AFO.  She is wearing shoes that are laced that her  tied for her.      Pt and family member are describing a walker at home with platform that they have not used and a hand splint that is only partially used.      Recent ankle injury today R LE.      Patient Occupation: Stays at home now.  Pain  No pain reported    Social Support  Lives with: spouse    Treatments  Previous treatment: physical therapy, speech therapy and home therapy  Discharged from (in last 30 days) comments: Last PT was last fall with OPPT at [facility].  .     Patient Goals  Patient goals for therapy: increased motion, increased strength and independence with ADLs/IADLs             Objective          Strength/Myotome Testing     Right Hip   Planes of Motion   Flexion: 4-  Extension: 3+  External rotation: 4-    Right Knee   Flexion: 2-  Prone flexion: 2-  Extension: 3-  Quadriceps contraction: poor    Ambulation   Weight-Bearing Status   Assistive device used: elvin walker    Additional Weight-Bearing Status Details  Pt with steppage gait pattern.  Hip flexion increased due to limited active DF of the R ankle.  Step to gait pattern.     Ambulation: Level Surfaces   Ambulation with assistive device: contact guard assist  Ambulation without assistive device: unable    Observational Gait   Gait: asymmetric   Decreased walking  speed and stride length.   Base of support: increased    Quality of Movement During Gait     Knee    Knee (Right): Positive recurvatum and extensor thrust.     Ankle    Ankle (Right): Positive foot drop.     Functional Assessment     Comments  Transfers:  Pt is able to transfer supine to sit independent.  She is able to transfer sit to stand independently with use of hemiwalker.               Assessment & Plan     Assessment  Impairments: abnormal coordination, abnormal gait, abnormal muscle firing, abnormal muscle tone, activity intolerance, impaired physical strength and lacks appropriate home exercise program  Assessment details: Patient is a 50 y.o. female. Patient presents to physical therapy due to complaints of difficulty with functional activity due to CVA 13 months ago.  Signs and symptoms are consistent with CVA with R hemiplegia.  She has a poor HEP and poor plan to progress functional activity at home.  She needs some an AFO and shoes that she can independently don/doff herself. Patient is at risk for falls according to history, symptoms and balance testing.   Prognosis: fair  Functional Limitations: walking and standing  Goals  Plan Goals: STG:  3 weeks     1. Pt will be independent with HEP  2. Pt will report of being able to schedule an appointment for AFO/shoes.   3.  Pt will have no good posture and body mechanics without any verbal cues from PT    LT weeks.    1.  Pt will be able to transfer independently and ambulate with hem iwalker or quad cane for 200 ft independently.   2.  Pt and family member will have knowledge of final HEP and functional activity to continue to progress her rehab at home.         Plan  Therapy options: will be seen for skilled physical therapy services  Planned therapy interventions: ADL retraining, motor coordination training, neuromuscular re-education, balance/weight-bearing training, body mechanics training, functional ROM exercises, gait training, home exercise  program, IADL retraining, therapeutic activities, transfer training, strengthening and prosthetic fitting/training  Duration in visits: 8  Duration in weeks: 8  Treatment plan discussed with: patient and caregiver        Manual Therapy:         mins  19902;  Therapeutic Exercise:         mins  17631;     Neuromuscular Antonio:    13    mins  42895;    Therapeutic Activity:     12    mins  59068;     Gait Training:           mins  58090;     Ultrasound:          mins  91025;    Electrical Stimulation:         mins  51004 ( );  Dry Needling          mins self-pay    Timed Treatment:   25   mins   Total Treatment:     55   mins    PT SIGNATURE: Carlos Mistry, PT   DATE TREATMENT INITIATED: 3/5/2021    Initial Certification  Certification Period: 6/3/2021  I certify that the therapy services are furnished while this patient is under my care.  The services outlined above are required by this patient, and will be reviewed every 90 days.     PHYSICIAN: Meme Padilla MD      DATE:     Please sign and return via fax to  .. Thank you, [facility] Physical Therapy.

## 2021-03-09 ENCOUNTER — TELEPHONE (OUTPATIENT)
Dept: NEUROLOGY | Facility: CLINIC | Age: 51
End: 2021-03-09

## 2021-03-09 NOTE — TELEPHONE ENCOUNTER
Provider: MARIA  Caller: ELIZABETH[employer] JESSICA  Relationship to Patient: N/A  Phone Number: [phone] FAX  Reason for Call: INCOMPLETE FAX OF PHYSICIANS' STATEMENT RECEIVED; CAN YOU RE-SEND?    THANK YOU.

## 2021-03-12 DIAGNOSIS — R56.9 FOCAL SEIZURE (HCC): ICD-10-CM

## 2021-03-12 RX ORDER — LEVETIRACETAM 500 MG/1
500 TABLET ORAL EVERY 12 HOURS SCHEDULED
Qty: 60 TABLET | Refills: 5 | Status: SHIPPED | OUTPATIENT
Start: 2021-03-12 | End: 2021-08-20 | Stop reason: SDUPTHER

## 2021-03-19 ENCOUNTER — TREATMENT (OUTPATIENT)
Dept: PHYSICAL THERAPY | Facility: CLINIC | Age: 51
End: 2021-03-19

## 2021-03-19 DIAGNOSIS — I69.151 HEMIPARESIS OF RIGHT DOMINANT SIDE AS LATE EFFECT OF NONTRAUMATIC INTRACEREBRAL HEMORRHAGE (HCC): Primary | ICD-10-CM

## 2021-03-19 PROCEDURE — 97530 THERAPEUTIC ACTIVITIES: CPT | Performed by: PHYSICAL THERAPIST

## 2021-03-19 PROCEDURE — 97110 THERAPEUTIC EXERCISES: CPT | Performed by: PHYSICAL THERAPIST

## 2021-03-19 NOTE — PROGRESS NOTES
Physical Therapy Daily Progress Note    Patient Information  Yee Goodwin  1970      Visit # : 2    Yee Goodwin reports 0/10 pain today at rest.  Pt just went to the orthotist today.     Pt reports last week she had a flare up of joint pain in the B ankles, Knee and R hip.      She had to take steroids for the joint pain.      Objective Pt presents to PT today with elvin walker in use today.      Pt needed moderate cues for HEP reproduction.     Pt given more education on reason why to continue to exercise more frequently.     See Exercise, Manual, and Modality Logs for complete treatment.     Assessment/Plan  Pt is going to get AFO.  Pt has not been doing as much HEP as I would have liked.        Progress per Plan of Care and Progress strengthening /stabilization /functional activity  Check HEP reproduction and AFO.     Visit Diagnoses:    ICD-10-CM ICD-9-CM   1. Hemiparesis of right dominant side as late effect of nontraumatic intracerebral hemorrhage (CMS/HCC)  I69.151 438.21            Manual Therapy:         mins  37172;  Therapeutic Exercise:    32     mins  87710;     Neuromuscular Antonio:        mins  98763;    Therapeutic Activity:     24     mins  72271;     Gait Training:           mins  35865;     Ultrasound:          mins  98654;    Electrical Stimulation:         mins  69230 ( );  Dry Needling          mins self-pay    Timed Treatment:   56   mins   Total Treatment:     56   mins          Carlos Mistry, PT  Physical Therapist

## 2021-04-05 ENCOUNTER — TELEPHONE (OUTPATIENT)
Dept: NEUROLOGY | Facility: CLINIC | Age: 51
End: 2021-04-05

## 2021-04-05 ENCOUNTER — TRANSCRIBE ORDERS (OUTPATIENT)
Dept: ADMINISTRATIVE | Facility: HOSPITAL | Age: 51
End: 2021-04-05

## 2021-04-05 DIAGNOSIS — R74.8 ABNORMAL LEVELS OF OTHER SERUM ENZYMES: Primary | ICD-10-CM

## 2021-04-05 NOTE — TELEPHONE ENCOUNTER
Provider: DR. SIFUENTES  Caller: [facility]      Patient: EILEEN HAMILTON  : 1970      Reason for Call: [facility] IS WANTING THE OFFICE VISIT NOTE FROM 2021. PLEASE FAX [phone]     IF YOU HAVE ANY QUESTIONS YOU CAN CALL HER BACK [phone]

## 2021-04-23 ENCOUNTER — HOSPITAL ENCOUNTER (OUTPATIENT)
Dept: ULTRASOUND IMAGING | Facility: HOSPITAL | Age: 51
Discharge: HOME | End: 2021-04-23
Admitting: FAMILY MEDICINE

## 2021-04-23 DIAGNOSIS — R74.8 ABNORMAL LEVELS OF OTHER SERUM ENZYMES: ICD-10-CM

## 2021-04-23 PROCEDURE — 76705 ECHO EXAM OF ABDOMEN: CPT

## 2021-07-15 ENCOUNTER — TELEPHONE (OUTPATIENT)
Dept: NEUROLOGY | Facility: CLINIC | Age: 51
End: 2021-07-15

## 2021-07-15 NOTE — TELEPHONE ENCOUNTER
Provider: MARIA   Caller: TONY   Relationship to Patient: PT   Phone Number: [phone]  Reason for Call: PT WOULD LIKE TO DO A MY CHART VIDEO VISIT FOR HER NEXT APPT ON 8/20/2021 PLEASE ADVISE IF THAT WILL BE OKAY?

## 2021-08-20 ENCOUNTER — TELEMEDICINE (OUTPATIENT)
Dept: NEUROLOGY | Facility: CLINIC | Age: 51
End: 2021-08-20

## 2021-08-20 DIAGNOSIS — G89.0 CENTRAL PAIN SYNDROME: ICD-10-CM

## 2021-08-20 DIAGNOSIS — R56.9 FOCAL SEIZURE (HCC): Primary | ICD-10-CM

## 2021-08-20 DIAGNOSIS — I69.151 HEMIPARESIS OF RIGHT DOMINANT SIDE AS LATE EFFECT OF NONTRAUMATIC INTRACEREBRAL HEMORRHAGE (HCC): ICD-10-CM

## 2021-08-20 PROCEDURE — 99213 OFFICE O/P EST LOW 20 MIN: CPT | Performed by: PSYCHIATRY & NEUROLOGY

## 2021-08-20 RX ORDER — LEVETIRACETAM 500 MG/1
500 TABLET ORAL EVERY 12 HOURS SCHEDULED
Qty: 60 TABLET | Refills: 5 | Status: SHIPPED | OUTPATIENT
Start: 2021-08-20 | End: 2022-03-28

## 2021-08-20 NOTE — PROGRESS NOTES
"Subjective:    CC: Yee Goodwin is seen today  for Focal Seizure (6 month follow up )       HPI:  Current visit-patient initially had difficulty getting her Keppra but has been getting it now at a low cost.  She continues to take a dose of 500 mg twice a day.  Denies having any seizure.  She also tried physical therapy for her right-sided weakness which has helped slightly.  Has not been wearing her AFO as she has difficulty walking with it.  Continues to be on Lipitor 40 mg daily.  She denies having any stiffness in her arm or leg.    Last visit-patient states she has not had any seizures since her last visit.  In fact her first and only seizure was in September of last year.  She continues to take Keppra 500 mg twice a day without any adverse effects.  Her blood pressure is well controlled now.  Patient continues to have some pain in her shoulder despite taking gabapentin 300 mg 3 times a day.  She also continues to have profound weakness in her right arm more than leg.  Stopped getting PT last year as her insurance would not pay for it.    Initial [age]xau-12-dgor-old female accompanied by her  with a history of hypertension, left frontal intracerebral hemorrhage status post craniotomy, GERD presents with a stroke.  As per patient she had a left intracerebral hemorrhage in January with intraventricular extension that was attributed to her uncontrolled hypertension.  After that patient denied having any seizures but on September 8 she had an episode of inability to speak with just repeating \"okay\" over and over again.  She also notes that she had right arm tingling but no jerking.  Was brought to the hospital where she had a CT scan of the head and MRI brain that again showed a left frontal encephalomalacia but no acute abnormalities.  CT angiogram of the head and neck showed plaques within bilateral carotid bulbs and proximal ICAs but no significant stenosis.  EEG showed left frontal slowing with breach " rhythm but no epileptiform activity.  Patient was started on Keppra 500 mg twice a day and has not had any more episodes since then.  She is able to tolerate the Keppra well other than mild somnolence.  Was also started on Lipitor as her LDL was 136.  She states that her blood pressure is much better controlled now.  Denies any febrile seizures as a child but there is a family history of seizures in her mother.  Of note-I personally reviewed her MRI brain and 's note from the hospital    The following portions of the patient's history were reviewed today and updated as of 10/30/2020  : allergies, current medications, past family history, past medical history, past social history, past surgical history and problem list  These document will be scanned to patient's chart.      Current Outpatient Medications:   •  acetaminophen (TYLENOL) 325 MG tablet, Take 2 tablets by mouth Every 6 (Six) Hours As Needed for Mild Pain ., Disp: , Rfl:   •  atorvastatin (Lipitor) 40 MG tablet, Take 1 tablet by mouth Every Night., Disp: 30 tablet, Rfl: 0  •  carvedilol (COREG) 12.5 MG tablet, Take 12.5 mg by mouth 2 (Two) Times a Day With Meals., Disp: , Rfl:   •  doxylamine (UNISOM) 25 MG tablet, Take 50 mg by mouth At Night As Needed for Sleep., Disp: , Rfl:   •  gabapentin (NEURONTIN) 300 MG capsule, Take 300 mg by mouth 3 (Three) Times a Day., Disp: , Rfl:   •  levETIRAcetam (KEPPRA) 500 MG tablet, Take 1 tablet by mouth Every 12 (Twelve) Hours., Disp: 60 tablet, Rfl: 5  •  pantoprazole (PROTONIX) 20 MG EC tablet, Take 20 mg by mouth Daily., Disp: , Rfl:    Past Medical History:   Diagnosis Date   • Arthritis    • Diverticulitis    • GERD (gastroesophageal reflux disease)    • Hypertension    • Impaired functional mobility, balance, gait, and endurance    • Seizures (CMS/HCC)    • Stroke (CMS/HCC)       Past Surgical History:   Procedure Laterality Date   • CEREBRAL ANGIOGRAM N/A 1/29/2020    Procedure: Cerebral angiogram;   Surgeon: Merlin Black MD;  Location:  [facility];  Service: Interventional Radiology   • CHOLECYSTECTOMY     • COLONOSCOPY  2015   • CRANIOTOMY FOR SUBDURAL HEMATOMA Left 2020    Procedure: FRONTAL CRANIOTOMY FOR EVACUATION OF INTRAPARENCHYMAL HEMATOMA  LEFT;  Surgeon: Jose Cole MD;  Location:  [facility] OR;  Service: Neurosurgery;  Laterality: Left;   • ENDOSCOPY W/ PEG TUBE PLACEMENT N/A 2/3/2020    Procedure: ESOPHAGOGASTRODUODENOSCOPY WITH PERCUTANEOUS ENDOSCOPIC GASTROSTOMY TUBE INSERTION AT BEDSIDE;  Surgeon: Andrew Young MD;  Location:  [facility] ENDOSCOPY;  Service: General;  Laterality: N/A;   • HAND TENDON SURGERY Right     patient hand right thumb tendon surgery   • HIP SURGERY     • JOINT REPLACEMENT     • TUBAL ABDOMINAL LIGATION        Family History   Problem Relation Age of Onset   • Arthritis Mother    • Hypertension Mother    • Stroke Mother    • Arthritis Father    • Cancer Father    • Lung cancer Father    • Throat cancer Father    • Heart disease Sister    • Pulmonary embolism Brother       Social History     Socioeconomic History   • Marital status:      Spouse name: Not on file   • Number of children: Not on file   • Years of education: Not on file   • Highest education level: Not on file   Tobacco Use   • Smoking status: Former Smoker     Packs/day: 0.50     Years: 25.00     Pack years: 12.50     Types: Cigarettes     Quit date: 2019     Years since quittin.2   • Smokeless tobacco: Never Used   Substance and Sexual Activity   • Alcohol use: Yes     Alcohol/week: 3.0 standard drinks     Types: 3 Glasses of wine per week     Comment: 1 glass of wine every 2-3 days   • Drug use: No   • Sexual activity: Defer     Review of Systems   Musculoskeletal: Positive for arthralgias and gait problem.   Neurological: Positive for weakness.   All other systems reviewed and are negative.      Objective:    There were no vitals taken for this  visit.    Neurology Exam:    General apperance: NAD.     Mental status: Alert, awake and oriented to time place and person.  Could tell me the date, month and her full address    Recent and Remote memory: Intact.    Attention span and Concentration: Normal.     Language and Speech: Intact- No dysarthria but slightly halting speech.    Fluency, Naming , Repitition and Comprehension:  Intact    Cranial Nerves:   CN II: Visual fields are full. Intact. Fundi - Normal, No papillederma, Pupils - BENJIE  CN III, IV and VI: Extraocular movements are intact. Normal saccades.   CN V: Facial sensation is intact.   CN VII: Muscles of facial expression reveal no asymmetry. Intact.   CN VIII: Hearing is intact. Whispered voice intact.   CN IX and X: Palate elevates symmetrically. Intact  CN XI: Shoulder shrug is intact.   CN XII: Tongue is midline without evidence of atrophy or fasciculation.     Motor:  Right UE muscle strength 2/5 with mildly increased tone    Left UE muscle strength 5/5. Normal tone.      Right LE muscle strength4/5 proximally, 3/5 onKF, 3/5 on PF,1/5 DF. Normal tone.     Left LE muscle strength 5/5. Normal tone.      Gait: High steppage gait.  Uses a hemiwalker    Assessment and Plan:  1. Focal seizure (CMS/HCC)  Patient most likely had a focal seizure from the area of her left frontal encephalomalacia  I have asked her to continue Keppra 500 mg twice a day  Counseled on medication compliance    2.  Central Pain syndrome  She is currently also on gabapentin 300 mg 3 times daily for poststroke pain      3.  Right-sided weakness secondary to left hemorrhagic stroke  She has completed PT  I have encouraged her to wear an AFO for her foot drop on the right  She will let me know if she needs a prescription for baclofen         Return in about 6 months (around 2/20/2022).         Meme Padilla MD

## 2022-02-25 ENCOUNTER — OFFICE VISIT (OUTPATIENT)
Dept: NEUROLOGY | Facility: CLINIC | Age: 52
End: 2022-02-25

## 2022-02-25 VITALS
BODY MASS INDEX: 29.55 KG/M2 | HEIGHT: 65 IN | SYSTOLIC BLOOD PRESSURE: 124 MMHG | DIASTOLIC BLOOD PRESSURE: 86 MMHG | OXYGEN SATURATION: 95 % | HEART RATE: 89 BPM

## 2022-02-25 DIAGNOSIS — R56.9 FOCAL SEIZURE: Primary | ICD-10-CM

## 2022-02-25 DIAGNOSIS — I69.151 HEMIPARESIS OF RIGHT DOMINANT SIDE AS LATE EFFECT OF NONTRAUMATIC INTRACEREBRAL HEMORRHAGE: ICD-10-CM

## 2022-02-25 DIAGNOSIS — G89.0 CENTRAL PAIN SYNDROME: ICD-10-CM

## 2022-02-25 PROCEDURE — 99213 OFFICE O/P EST LOW 20 MIN: CPT | Performed by: PSYCHIATRY & NEUROLOGY

## 2022-02-25 RX ORDER — TRIAMCINOLONE ACETONIDE 1 MG/G
CREAM TOPICAL
COMMUNITY
Start: 2022-02-14

## 2022-02-25 RX ORDER — POTASSIUM CHLORIDE 750 MG/1
TABLET, FILM COATED, EXTENDED RELEASE ORAL
COMMUNITY

## 2022-02-25 RX ORDER — FOLIC ACID 1 MG/1
1 TABLET ORAL DAILY
COMMUNITY
Start: 2022-02-24

## 2022-02-25 RX ORDER — PANTOPRAZOLE SODIUM 40 MG/1
TABLET, DELAYED RELEASE ORAL
COMMUNITY
Start: 2022-02-24 | End: 2022-02-25

## 2022-02-25 RX ORDER — IBUPROFEN 800 MG/1
TABLET ORAL
COMMUNITY

## 2022-02-25 RX ORDER — FUROSEMIDE 20 MG/1
TABLET ORAL
COMMUNITY

## 2022-02-25 RX ORDER — ROSUVASTATIN CALCIUM 20 MG/1
TABLET, COATED ORAL
COMMUNITY
Start: 2021-06-28

## 2022-02-25 RX ORDER — LISINOPRIL 10 MG/1
TABLET ORAL
COMMUNITY

## 2022-02-25 RX ORDER — ERGOCALCIFEROL 1.25 MG/1
CAPSULE ORAL
COMMUNITY

## 2022-02-25 NOTE — PROGRESS NOTES
Subjective:    CC: Yee Goodwin is seen today  for Focal seizure       HPI:  Current visit-patient denies having any seizures since her last visit.  Her first and only seizure was in September 2020 9 months after her intracerebral hemorrhage.  She continues to take Keppra 500 mg twice a day.  Her pain in the right arm and leg has also improved and she has cut back on her gabapentin to 400 mg daily.  Has stopped getting PT now.  Does not wear an AFO for her right foot drop as it would rub against her skin.  Uses a hemiwalker at home.  Her blood pressure is well controlled.    Last visit-patient initially had difficulty getting her Keppra but has been getting it now at a low cost.  She continues to take a dose of 500 mg twice a day.  Denies having any seizure.  She also tried physical therapy for her right-sided weakness which has helped slightly.  Has not been wearing her AFO as she has difficulty walking with it.  Continues to be on Lipitor 40 mg daily.  She denies having any stiffness in her arm or leg.    Last visit-patient states she has not had any seizures since her last visit.  In fact her first and only seizure was in September of last year.  She continues to take Keppra 500 mg twice a day without any adverse effects.  Her blood pressure is well controlled now.  Patient continues to have some pain in her shoulder despite taking gabapentin 300 mg 3 times a day.  She also continues to have profound weakness in her right arm more than leg.  Stopped getting PT last year as her insurance would not pay for it.    Initial oscpe-87-q[age]-old female accompanied by her  with a history of hypertension, left frontal intracerebral hemorrhage status post craniotomy, GERD presents with a stroke.  As per patient she had a left intracerebral hemorrhage in January with intraventricular extension that was attributed to her uncontrolled hypertension.  After that patient denied having any seizures but on September 8 she  "had an episode of inability to speak with just repeating \"okay\" over and over again.  She also notes that she had right arm tingling but no jerking.  Was brought to the hospital where she had a CT scan of the head and MRI brain that again showed a left frontal encephalomalacia but no acute abnormalities.  CT angiogram of the head and neck showed plaques within bilateral carotid bulbs and proximal ICAs but no significant stenosis.  EEG showed left frontal slowing with breach rhythm but no epileptiform activity.  Patient was started on Keppra 500 mg twice a day and has not had any more episodes since then.  She is able to tolerate the Keppra well other than mild somnolence.  Was also started on Lipitor as her LDL was 136.  She states that her blood pressure is much better controlled now.  Denies any febrile seizures as a child but there is a family history of seizures in her mother.  Of note-I personally reviewed her MRI brain and 's note from the hospital    The following portions of the patient's history were reviewed today and updated as of 10/30/2020  : allergies, current medications, past family history, past medical history, past social history, past surgical history and problem list  These document will be scanned to patient's chart.      Current Outpatient Medications:   •  acetaminophen (TYLENOL) 325 MG tablet, Take 2 tablets by mouth Every 6 (Six) Hours As Needed for Mild Pain ., Disp: , Rfl:   •  atorvastatin (Lipitor) 40 MG tablet, Take 1 tablet by mouth Every Night., Disp: 30 tablet, Rfl: 0  •  carvedilol (COREG) 12.5 MG tablet, Take 12.5 mg by mouth 2 (Two) Times a Day With Meals., Disp: , Rfl:   •  doxylamine (UNISOM) 25 MG tablet, Take 50 mg by mouth At Night As Needed for Sleep., Disp: , Rfl:   •  ergocalciferol (ERGOCALCIFEROL) 1.25 MG (81611 UT) capsule, Vitamin D2 1,250 mcg (50,000 unit) capsule  Take 1 capsule every week by oral route for 90 days., Disp: , Rfl:   •  folic acid " (FOLVITE) 1 MG tablet, , Disp: , Rfl:   •  furosemide (Lasix) 20 MG tablet, Lasix 20 mg tablet  Take 1 tablet as needed by oral route in the morning for 90 days., Disp: , Rfl:   •  gabapentin (NEURONTIN) 300 MG capsule, Take 400 mg by mouth 1 (One) Time., Disp: , Rfl:   •  ibuprofen (ADVIL,MOTRIN) 800 MG tablet, ibuprofen 800 mg tablet  TAKE 1 TABLET BY MOUTH 1-2 times per week, only as needed, Disp: , Rfl:   •  levETIRAcetam (KEPPRA) 500 MG tablet, Take 1 tablet by mouth Every 12 (Twelve) Hours., Disp: 60 tablet, Rfl: 5  •  lisinopril (PRINIVIL,ZESTRIL) 10 MG tablet, lisinopril 10 mg tablet  TAKE 1 TABLET BY MOUTH EVERY DAY, Disp: , Rfl:   •  mupirocin (BACTROBAN) 2 % ointment, mupirocin 2 % topical ointment  APPLY A SMALL AMOUNT TO THE AFFECTED AREA BY TOPICAL ROUTE 3 TIMES PER DAY, Disp: , Rfl:   •  pantoprazole (PROTONIX) 20 MG EC tablet, Take 20 mg by mouth Daily., Disp: , Rfl:   •  potassium chloride 10 MEQ CR tablet, potassium chloride ER 10 mEq tablet,extended release  Take 1 tablet every day by oral route as needed for 90 days.  Take with lasix, Disp: , Rfl:   •  rosuvastatin (CRESTOR) 20 MG tablet, rosuvastatin 20 mg tablet  TAKE 1 TABLET BY MOUTH EVERY DAY AT BEDTIME, Disp: , Rfl:   •  triamcinolone (KENALOG) 0.1 % cream, APPLY THIN LAYER TOPICALLY TO THE AFFECTED AREA TWICE DAILY, Disp: , Rfl:    Past Medical History:   Diagnosis Date   • Arthritis    • Diverticulitis    • GERD (gastroesophageal reflux disease)    • Hypertension    • Impaired functional mobility, balance, gait, and endurance    • Seizures (HCC)    • Stroke (HCC)       Past Surgical History:   Procedure Laterality Date   • CEREBRAL ANGIOGRAM N/A 1/29/2020    Procedure: Cerebral angiogram;  Surgeon: Mrelin Black MD;  Location: [facility] INVASIVE LOC[facility];  Service: Interventional Radiology   • CHOLECYSTECTOMY     • COLONOSCOPY  2015   • CRANIOTOMY FOR SUBDURAL HEMATOMA Left 1/30/2020    Procedure: FRONTAL CRANIOTOMY FOR EVACUATION OF  "INTRAPARENCHYMAL HEMATOMA  LEFT;  Surgeon: Jose Cole MD;  Location: [facility] OR;  Service: Neurosurgery;  Laterality: Left;   • ENDOSCOPY W/ PEG TUBE PLACEMENT N/A 2/3/2020    Procedure: ESOPHAGOGASTRODUODENOSCOPY WITH PERCUTANEOUS ENDOSCOPIC GASTROSTOMY TUBE INSERTION AT BEDSIDE;  Surgeon: Andrew Young MD;  Location:  [facility];  Service: General;  Laterality: N/A;   • HAND TENDON SURGERY Right     patient hand right thumb tendon surgery   • HIP SURGERY     • JOINT REPLACEMENT     • TUBAL ABDOMINAL LIGATION        Family History   Problem Relation Age of Onset   • Arthritis Mother    • Hypertension Mother    • Stroke Mother    • Arthritis Father    • Cancer Father    • Lung cancer Father    • Throat cancer Father    • Heart disease Sister    • Pulmonary embolism Brother       Social History     Socioeconomic History   • Marital status:    Tobacco Use   • Smoking status: Former Smoker     Packs/day: 0.50     Years: 25.00     Pack years: 12.50     Types: Cigarettes     Quit date: 2019     Years since quittin.7   • Smokeless tobacco: Never Used   Substance and Sexual Activity   • Alcohol use: Yes     Alcohol/week: 3.0 standard drinks     Types: 3 Glasses of wine per week     Comment: 1 glass of wine every 2-3 days   • Drug use: No   • Sexual activity: Defer     Review of Systems   Musculoskeletal: Positive for arthralgias and gait problem.   Neurological: Positive for weakness.   All other systems reviewed and are negative.      Objective:    /86   Pulse 89   Ht 165.1 cm (65\")   SpO2 95%   BMI 29.55 kg/m²     Neurology Exam:    General apperance: NAD.     Mental status: Alert, awake and oriented to time place and person.  Could tell me the date, month and her full address    Recent and Remote memory: Intact.    Attention span and Concentration: Normal.     Language and Speech: Intact- No dysarthria but slightly halting speech.    Fluency, Naming , Repitition and " Comprehension:  Intact    Cranial Nerves:   CN II: Visual fields are full. Intact. Fundi - Normal, No papillederma, Pupils - BENJIE  CN III, IV and VI: Extraocular movements are intact. Normal saccades.   CN V: Facial sensation is intact.   CN VII: Muscles of facial expression reveal no asymmetry. Intact.   CN VIII: Hearing is intact. Whispered voice intact.   CN IX and X: Palate elevates symmetrically. Intact  CN XI: Shoulder shrug is intact.   CN XII: Tongue is midline without evidence of atrophy or fasciculation.     Motor:  Right UE muscle strength 2/5 with mildly increased tone    Left UE muscle strength 5/5. Normal tone.      Right LE muscle strength4/5 proximally, 3/5 onKF, 3/5 on PF,1/5 DF. Normal tone.     Left LE muscle strength 5/5. Normal tone.      Gait: High steppage gait.  Uses a hemiwalker    Assessment and Plan:  1. Focal seizure (CMS/HCC)  Patient most likely had a focal seizure from the area of her left frontal encephalomalacia  I have asked her to continue Keppra 500 mg twice a day.  She wants to cut back on her dose however I made her aware that she was already on a low-dose and anything lower than this may not help her seizures  Counseled on medication compliance    2.  Central Pain syndrome  On gabapentin 400 mg daily      3.  Right-sided weakness secondary to left hemorrhagic stroke  She has completed PT  I have encouraged her to wear an AFO for her foot drop at least when she goes outside           Return in about 1 year (around 2/25/2023).         Meme Padilla MD

## 2022-03-26 DIAGNOSIS — R56.9 FOCAL SEIZURE: ICD-10-CM

## 2022-03-28 RX ORDER — LEVETIRACETAM 500 MG/1
TABLET ORAL
Qty: 60 TABLET | Refills: 5 | Status: SHIPPED | OUTPATIENT
Start: 2022-03-28 | End: 2022-09-30

## 2022-05-03 ENCOUNTER — TELEPHONE (OUTPATIENT)
Dept: NEUROLOGY | Facility: CLINIC | Age: 52
End: 2022-05-03

## 2022-05-03 NOTE — TELEPHONE ENCOUNTER
Caller: Quentin Wong    Relationship: Emergency Contact    Best call back number: [phone]   PT'S PHONE    Who are you requesting to speak with (clinical staff, provider,  specific staff member):   MARIA    What was the call regarding:   PT'S LONG TERM DISABILITY CLAIM  [facility] [employer] IS REQUESTING DISABILITY STATUS FORM.  A FORM WIAS FAXED OR MAILED TO THE OFFICE. THIS FORM MUST BE COMPLETED AND FAXED BACK TO [employer] ASAP.    PLEASE PUT ATTN: CALISTA AT [employer]/BENEFITS CLAIMS DEPT    Do you require a callback: PLEASE CALL THE PT IF THE PAPERWORK HASN'T BEEN RECEIVED YET.

## 2022-05-03 NOTE — TELEPHONE ENCOUNTER
Called and informed patients  that form was faxed 3/23/2022 but that I would refax the form. Patient asked that I refax form and no further questions.    Forms refaxed 5/3/2022 @ 2:30p.m. and ATTN: CALISTA

## 2022-05-06 NOTE — TELEPHONE ENCOUNTER
Caller: Quentin Wong    Relationship: Emergency Contact; SPOUSE    Best call back number: [phone]    What was the call regarding: PT'S SPOUSE CALLED STATING THAT THE FORMS THAT WERE REFAXED ON 5/3/22 WERE SIGNED BY PT'S NEUROSURGEON, DR. CONTRERAS, NOT DR. SIFUENTES. PT'S  STATES FORMS NEED TO BE COMPLETED & SIGNED BY DR. SIFUENTES. PT'S  STATES [employer] INFORMED HIM THAT THEY REFAXED BLANK FORMS TO THE OFFICE.    PT'S  IS ASKING OFFICE TO CHECK INCOMING FAXES TO ENSURE FORMS WERE RECEIVED. PT'S  STATES FORMS ARE DUE BY 5/28/22 OR PT'S BENEFITS WILL BE DISCONTINUED.    Do you require a callback: YES, PLEASE CALL WITH UPDATES REGARDING FORMS.    PLEASE REVIEW AND ADVISE.

## 2022-05-06 NOTE — TELEPHONE ENCOUNTER
Called and spoke to Pt to see if they had direct number for [place] [employer] [facility] since we do not have the new paperwork. It is Cash at [phone].   They stated that the forms have to be for health status of the last 6 months.

## 2022-09-30 DIAGNOSIS — R56.9 FOCAL SEIZURE: ICD-10-CM

## 2022-09-30 RX ORDER — LEVETIRACETAM 500 MG/1
TABLET ORAL
Qty: 60 TABLET | Refills: 5 | Status: SHIPPED | OUTPATIENT
Start: 2022-09-30

## 2022-11-17 ENCOUNTER — LAB (OUTPATIENT)
Dept: LAB | Facility: HOSPITAL | Age: 52
End: 2022-11-17

## 2022-11-17 ENCOUNTER — OFFICE VISIT (OUTPATIENT)
Dept: GASTROENTEROLOGY | Facility: CLINIC | Age: 52
End: 2022-11-17

## 2022-11-17 VITALS
SYSTOLIC BLOOD PRESSURE: 129 MMHG | HEART RATE: 84 BPM | BODY MASS INDEX: 29.55 KG/M2 | DIASTOLIC BLOOD PRESSURE: 66 MMHG | HEIGHT: 65 IN | TEMPERATURE: 98.2 F

## 2022-11-17 DIAGNOSIS — K21.9 GASTROESOPHAGEAL REFLUX DISEASE WITHOUT ESOPHAGITIS: ICD-10-CM

## 2022-11-17 DIAGNOSIS — R79.89 ELEVATED LFTS: ICD-10-CM

## 2022-11-17 DIAGNOSIS — R11.0 NAUSEA: ICD-10-CM

## 2022-11-17 DIAGNOSIS — R79.89 ELEVATED LFTS: Primary | ICD-10-CM

## 2022-11-17 DIAGNOSIS — K76.0 FATTY LIVER: ICD-10-CM

## 2022-11-17 DIAGNOSIS — Z86.010 PERSONAL HISTORY OF COLONIC POLYPS: ICD-10-CM

## 2022-11-17 LAB
ALPHA1 GLOB MFR UR ELPH: 189 MG/DL (ref 90–200)
BASOPHILS # BLD AUTO: 0.14 10*3/MM3 (ref 0–0.2)
BASOPHILS NFR BLD AUTO: 1.5 % (ref 0–1.5)
CERULOPLASMIN SERPL-MCNC: 29 MG/DL (ref 19–39)
DEPRECATED RDW RBC AUTO: 40.1 FL (ref 37–54)
EOSINOPHIL # BLD AUTO: 0.38 10*3/MM3 (ref 0–0.4)
EOSINOPHIL NFR BLD AUTO: 4.1 % (ref 0.3–6.2)
ERYTHROCYTE [DISTWIDTH] IN BLOOD BY AUTOMATED COUNT: 12.2 % (ref 12.3–15.4)
FERRITIN SERPL-MCNC: 1382 NG/ML (ref 13–150)
HBV SURFACE AB SER RIA-ACNC: NORMAL
HBV SURFACE AG SERPL QL IA: NORMAL
HCT VFR BLD AUTO: 39.7 % (ref 34–46.6)
HCV AB SER DONR QL: NORMAL
HGB BLD-MCNC: 13.1 G/DL (ref 12–15.9)
IMM GRANULOCYTES # BLD AUTO: 0.05 10*3/MM3 (ref 0–0.05)
IMM GRANULOCYTES NFR BLD AUTO: 0.5 % (ref 0–0.5)
INR PPP: 0.96 (ref 0.9–1.1)
IRON 24H UR-MRATE: 89 MCG/DL (ref 37–145)
IRON SATN MFR SERPL: 22 % (ref 20–50)
LYMPHOCYTES # BLD AUTO: 1.62 10*3/MM3 (ref 0.7–3.1)
LYMPHOCYTES NFR BLD AUTO: 17.5 % (ref 19.6–45.3)
MCH RBC QN AUTO: 30 PG (ref 26.6–33)
MCHC RBC AUTO-ENTMCNC: 33 G/DL (ref 31.5–35.7)
MCV RBC AUTO: 90.8 FL (ref 79–97)
MONOCYTES # BLD AUTO: 0.7 10*3/MM3 (ref 0.1–0.9)
MONOCYTES NFR BLD AUTO: 7.6 % (ref 5–12)
NEUTROPHILS NFR BLD AUTO: 6.35 10*3/MM3 (ref 1.7–7)
NEUTROPHILS NFR BLD AUTO: 68.8 % (ref 42.7–76)
NRBC BLD AUTO-RTO: 0 /100 WBC (ref 0–0.2)
PLATELET # BLD AUTO: 289 10*3/MM3 (ref 140–450)
PMV BLD AUTO: 9.6 FL (ref 6–12)
PROTHROMBIN TIME: 13.1 SECONDS (ref 12.5–14.5)
RBC # BLD AUTO: 4.37 10*6/MM3 (ref 3.77–5.28)
TIBC SERPL-MCNC: 410 MCG/DL (ref 298–536)
TRANSFERRIN SERPL-MCNC: 275 MG/DL (ref 200–360)
WBC NRBC COR # BLD: 9.24 10*3/MM3 (ref 3.4–10.8)

## 2022-11-17 PROCEDURE — 87340 HEPATITIS B SURFACE AG IA: CPT

## 2022-11-17 PROCEDURE — 86704 HEP B CORE ANTIBODY TOTAL: CPT

## 2022-11-17 PROCEDURE — 83883 ASSAY NEPHELOMETRY NOT SPEC: CPT

## 2022-11-17 PROCEDURE — 82947 ASSAY GLUCOSE BLOOD QUANT: CPT

## 2022-11-17 PROCEDURE — 84460 ALANINE AMINO (ALT) (SGPT): CPT

## 2022-11-17 PROCEDURE — 82247 BILIRUBIN TOTAL: CPT

## 2022-11-17 PROCEDURE — 85610 PROTHROMBIN TIME: CPT

## 2022-11-17 PROCEDURE — 82390 ASSAY OF CERULOPLASMIN: CPT

## 2022-11-17 PROCEDURE — 86038 ANTINUCLEAR ANTIBODIES: CPT

## 2022-11-17 PROCEDURE — 84466 ASSAY OF TRANSFERRIN: CPT

## 2022-11-17 PROCEDURE — 86015 ACTIN ANTIBODY EACH: CPT

## 2022-11-17 PROCEDURE — 99204 OFFICE O/P NEW MOD 45 MIN: CPT | Performed by: INTERNAL MEDICINE

## 2022-11-17 PROCEDURE — 82172 ASSAY OF APOLIPOPROTEIN: CPT

## 2022-11-17 PROCEDURE — 86803 HEPATITIS C AB TEST: CPT

## 2022-11-17 PROCEDURE — 86706 HEP B SURFACE ANTIBODY: CPT

## 2022-11-17 PROCEDURE — 82103 ALPHA-1-ANTITRYPSIN TOTAL: CPT

## 2022-11-17 PROCEDURE — 82728 ASSAY OF FERRITIN: CPT

## 2022-11-17 PROCEDURE — 36415 COLL VENOUS BLD VENIPUNCTURE: CPT

## 2022-11-17 PROCEDURE — 85025 COMPLETE CBC W/AUTO DIFF WBC: CPT

## 2022-11-17 PROCEDURE — 82977 ASSAY OF GGT: CPT

## 2022-11-17 PROCEDURE — 83010 ASSAY OF HAPTOGLOBIN QUANT: CPT

## 2022-11-17 PROCEDURE — 84450 TRANSFERASE (AST) (SGOT): CPT

## 2022-11-17 PROCEDURE — 83540 ASSAY OF IRON: CPT

## 2022-11-17 PROCEDURE — 84478 ASSAY OF TRIGLYCERIDES: CPT

## 2022-11-17 PROCEDURE — 86381 MITOCHONDRIAL ANTIBODY EACH: CPT

## 2022-11-17 PROCEDURE — 82465 ASSAY BLD/SERUM CHOLESTEROL: CPT

## 2022-11-17 PROCEDURE — 86708 HEPATITIS A ANTIBODY: CPT

## 2022-11-17 RX ORDER — CYCLOBENZAPRINE HCL 10 MG
TABLET ORAL
COMMUNITY
Start: 2022-08-19

## 2022-11-17 RX ORDER — LIDOCAINE 50 MG/G
PATCH TOPICAL
COMMUNITY

## 2022-11-17 RX ORDER — PANTOPRAZOLE SODIUM 20 MG/1
20 TABLET, DELAYED RELEASE ORAL DAILY
Qty: 90 TABLET | Refills: 3 | Status: SHIPPED | OUTPATIENT
Start: 2022-11-17 | End: 2022-11-28 | Stop reason: SDUPTHER

## 2022-11-17 NOTE — PROGRESS NOTES
New Patient Consult      Date: 2022   Patient Name: Yee Goodwin  MRN: [id number]  : 1970     Referring Physician: Lula Marlow DO    Chief Complaint   Patient presents with   • Abnormal Lab       History of Present Illness: Yee Goodwin is a 52 y.o. female who is here today to establish care with Gastroenterology for evaluation of her recently found elevated liver enzymes.    Patient states that she had routine lab work done recently in 2022 which revealed elevated liver enzymes and subsequent follow-up of blood work done also revealed elevated liver enzymes.  She states that she had a stroke 2 years ago and had a right-sided hemiplegia.  He is mostly bedbound and gained about 20 to 25 pounds since then.  Her mom had a fatty liver disease but no family history of any liver cirrhosis.  Patient denies any new medications she is currently on statins    She had issues with his dysphagia after CVA and had a PEG tube placed in  that was removed later on, currently she is eating normally without any issues.  She had significant reflux disease in the past for which she was on a Protonix 40 mg p.o. daily. she denies any reflux symptoms.  She does have intermittent nausea episodes.    As per patient she had a colonoscopy done in  and had a polyps removed.  Postprocedure she developed bleeding and had to go back again for control of bleeding.  She denies any constipation or diarrhea no change in bowel habit no blood in the stool no black stools. Denies alcohol abuse or cigarette smoking.  She is an ex-smoker.      Subjective      Past Medical History:   Past Medical History:   Diagnosis Date   • Allergic rhinitis    • Arthritis    • Back pain    • Cerebral hemorrhage (HCC)    • Chicken pox    • Diverticulitis    • Folic acid deficiency    • GERD (gastroesophageal reflux disease)    • Headache    • Hidradenitis suppurativa    • History of blood transfusion    • History of blood  transfusion    • Hordeolum of lower eyelid    • Hypercalcemia    • Hyperlipidemia    • Hypertension    • Hypoglycemia    • Impaired functional mobility, balance, gait, and endurance    • Low back pain    • Lumbar spondylolysis    • NAFLD (nonalcoholic fatty liver disease)    • Neuropathy    • OA (osteoarthritis of the spine)    • Prediabetes    • Pulmonary emphysema (HCC)    • Seizures (HCC)    • Snores    • Stroke (HCC) 2020   • Tattoos    • Vitamin D deficiency        Past Surgical History:   Past Surgical History:   Procedure Laterality Date   • CEREBRAL ANGIOGRAM N/A 01/29/2020    Procedure: Cerebral angiogram;  Surgeon: Merlin Black MD;  Location: [facility];  Service: Interventional Radiology   • CHOLECYSTECTOMY N/A    • COLONOSCOPY N/A 2015   • CRANIOTOMY FOR SUBDURAL HEMATOMA Left 01/30/2020    Procedure: FRONTAL CRANIOTOMY FOR EVACUATION OF INTRAPARENCHYMAL HEMATOMA  LEFT;  Surgeon: Jose Cole MD;  Location: [facility] OR;  Service: Neurosurgery;  Laterality: Left;   • ENDOSCOPY W/ PEG TUBE PLACEMENT N/A 02/03/2020    Procedure: ESOPHAGOGASTRODUODENOSCOPY WITH PERCUTANEOUS ENDOSCOPIC GASTROSTOMY TUBE INSERTION AT BEDSIDE;  Surgeon: Andrew Young MD;  Location: [facility] ENDOSCOPY;  Service: General;  Laterality: N/A;   • HAND TENDON SURGERY Right     patient hand right thumb tendon surgery   • OVARIAN CYST REMOVAL     • TOTAL HIP ARTHROPLASTY Right 2002   • TOTAL HIP ARTHROPLASTY REVISION Right     x 2   • TUBAL ABDOMINAL LIGATION Bilateral 2004       Family History:   Family History   Problem Relation Age of Onset   • Arthritis Mother    • Hypertension Mother    • Stroke Mother    • Arthritis Father    • Cancer Father    • Lung cancer Father    • Throat cancer Father    • Heart disease Sister    • Pulmonary embolism Brother    • Colon cancer Neg Hx        Social History:   Social History     Socioeconomic History   • Marital status:    Tobacco Use   • Smoking  status: Former     Packs/day: 0.50     Years: 25.00     Pack years: 12.50     Types: Cigarettes     Quit date:      Years since quittin.8   • Smokeless tobacco: Never   Vaping Use   • Vaping Use: Never used   Substance and Sexual Activity   • Alcohol use: Yes     Alcohol/week: 3.0 standard drinks     Types: 3 Glasses of wine per week     Comment: 1 glass of wine every 2-3 days   • Drug use: Never   • Sexual activity: Defer         Current Outpatient Medications:   •  acetaminophen (TYLENOL) 325 MG tablet, Take 2 tablets by mouth Every 6 (Six) Hours As Needed for Mild Pain ., Disp: , Rfl:   •  carvedilol (COREG) 12.5 MG tablet, Take 12.5 mg by mouth 2 (Two) Times a Day With Meals., Disp: , Rfl:   •  co-enzyme Q-10 30 MG capsule, CoQ-10 30 mg capsule  Take 1 capsule every day by oral route for 30 days., Disp: , Rfl:   •  cyclobenzaprine (FLEXERIL) 10 MG tablet, Take 1 tablet every day by oral route at bedtime for 30 days., Disp: , Rfl:   •  doxylamine (UNISOM) 25 MG tablet, Take 25 mg by mouth At Night As Needed for Sleep., Disp: , Rfl:   •  ergocalciferol (ERGOCALCIFEROL) 1.25 MG (24019 UT) capsule, Vitamin D2 1,250 mcg (50,000 unit) capsule  Take 1 capsule every week by oral route for 90 days., Disp: , Rfl:   •  folic acid (FOLVITE) 1 MG tablet, Take 1 mg by mouth Daily., Disp: , Rfl:   •  furosemide (LASIX) 20 MG tablet, Lasix 20 mg tablet  Take 1 tablet as needed by oral route in the morning for 90 days., Disp: , Rfl:   •  gabapentin (NEURONTIN) 400 MG capsule, Take 400 mg by mouth Daily., Disp: , Rfl:   •  ibuprofen (ADVIL,MOTRIN) 800 MG tablet, ibuprofen 800 mg tablet  TAKE 1 TABLET BY MOUTH 1-2 times per week, only as needed, Disp: , Rfl:   •  levETIRAcetam (KEPPRA) 500 MG tablet, TAKE 1 TABLET BY MOUTH EVERY 12 HOURS, Disp: 60 tablet, Rfl: 5  •  lidocaine (LIDODERM) 5 %, lidocaine 5 % topical patch  APPLY 1 PATCH BY TOPICAL ROUTE ONCE DAILY (MAY WEAR UP TO 12HOURS.), Disp: , Rfl:   •  lisinopril  "(PRINIVIL,ZESTRIL) 10 MG tablet, lisinopril 10 mg tablet  TAKE 1 TABLET BY MOUTH EVERY DAY, Disp: , Rfl:   •  mupirocin (BACTROBAN) 2 % ointment, mupirocin 2 % topical ointment  APPLY A SMALL AMOUNT TO THE AFFECTED AREA BY TOPICAL ROUTE 3 TIMES PER DAY, Disp: , Rfl:   •  pantoprazole (PROTONIX) 20 MG EC tablet, Take 1 tablet by mouth Daily., Disp: 90 tablet, Rfl: 3  •  potassium chloride 10 MEQ CR tablet, potassium chloride ER 10 mEq tablet,extended release  Take 1 tablet every day by oral route as needed for 90 days.  Take with lasix, Disp: , Rfl:   •  rosuvastatin (CRESTOR) 20 MG tablet, rosuvastatin 20 mg tablet  TAKE 1 TABLET BY MOUTH EVERY DAY AT BEDTIME, Disp: , Rfl:   •  triamcinolone (KENALOG) 0.1 % cream, APPLY THIN LAYER TOPICALLY TO THE AFFECTED AREA TWICE DAILY, Disp: , Rfl:   •  atorvastatin (Lipitor) 40 MG tablet, Take 1 tablet by mouth Every Night., Disp: 30 tablet, Rfl: 0    Allergies   Allergen Reactions   • Venlafaxine Rash       Review of Systems:   Review of Systems   Constitutional: Negative for appetite change, fatigue, fever and unexpected weight loss.   HENT: Negative for trouble swallowing.    Gastrointestinal: Positive for nausea and vomiting. Negative for abdominal distention, abdominal pain, anal bleeding, blood in stool, constipation, diarrhea, rectal pain, GERD and indigestion.       The following portions of the patient's history were reviewed and updated as appropriate: allergies, current medications, past family history, past medical history, past social history, past surgical history and problem list.    Objective     Physical Exam:  Vital Signs:   Vitals:    11/17/22 1612   BP: 129/66   Pulse: 84   Temp: 98.2 °F (36.8 °C)   TempSrc: Infrared   Height: 165.1 cm (65\")       Physical Exam  Constitutional:       Appearance: She is obese.   HENT:      Head: Normocephalic and atraumatic.   Eyes:      Conjunctiva/sclera: Conjunctivae normal.   Abdominal:      General: Abdomen is flat. " There is no distension.      Palpations: There is no mass.      Tenderness: There is no abdominal tenderness. There is no guarding or rebound.      Hernia: No hernia is present.      Comments: Prior PEG tube scar noted in the upper abdomen   Musculoskeletal:      Cervical back: Normal range of motion and neck supple.   Neurological:      Mental Status: She is alert.           Results Review:   I have reviewed the patient's new clinical and imaging results and agree with the interpretation.     No visits with results within 90 Day(s) from this visit.   Latest known visit with results is:   Admission on 09/08/2020, Discharged on 09/09/2020   Component Date Value Ref Range Status   • Glucose 09/08/2020 110 (H)  65 - 99 mg/dL Final   • BUN 09/08/2020 15  6 - 20 mg/dL Final   • Creatinine 09/08/2020 0.55 (L)  0.57 - 1.00 mg/dL Final   • Sodium 09/08/2020 138  136 - 145 mmol/L Final   • Potassium 09/08/2020 4.2  3.5 - 5.2 mmol/L Final   • Chloride 09/08/2020 103  98 - 107 mmol/L Final   • CO2 09/08/2020 24.0  22.0 - 29.0 mmol/L Final   • Calcium 09/08/2020 10.2  8.6 - 10.5 mg/dL Final   • Total Protein 09/08/2020 7.8  6.0 - 8.5 g/dL Final   • Albumin 09/08/2020 4.60  3.50 - 5.20 g/dL Final   • ALT (SGPT) 09/08/2020 28  1 - 33 U/L Final   • AST (SGOT) 09/08/2020 22  1 - 32 U/L Final   • Alkaline Phosphatase 09/08/2020 137 (H)  39 - 117 U/L Final   • Total Bilirubin 09/08/2020 0.4  0.0 - 1.2 mg/dL Final   • eGFR Non African Amer 09/08/2020 116  >60 mL/min/1.73 Final   • Globulin 09/08/2020 3.2  gm/dL Final   • A/G Ratio 09/08/2020 1.4  g/dL Final   • BUN/Creatinine Ratio 09/08/2020 27.3 (H)  7.0 - 25.0 Final   • Anion Gap 09/08/2020 11.0  5.0 - 15.0 mmol/L Final   • Protime 09/08/2020 12.5  12.0 - 15.1 Seconds Final   • INR 09/08/2020 0.90  0.90 - 1.10 Final   • PTT 09/08/2020 23.3 (L)  24.5 - 37.2 seconds Final   • Troponin T 09/08/2020 <0.010  0.000 - 0.030 ng/mL Final   • WBC 09/08/2020 7.88  3.40 - 10.80 10*3/mm3 Final    • RBC 09/08/2020 4.69  3.77 - 5.28 10*6/mm3 Final   • Hemoglobin 09/08/2020 13.7  12.0 - 15.9 g/dL Final   • Hematocrit 09/08/2020 40.7  34.0 - 46.6 % Final   • MCV 09/08/2020 86.8  79.0 - 97.0 fL Final   • MCH 09/08/2020 29.2  26.6 - 33.0 pg Final   • MCHC 09/08/2020 33.7  31.5 - 35.7 g/dL Final   • RDW 09/08/2020 14.6  12.3 - 15.4 % Final   • RDW-SD 09/08/2020 46.6  37.0 - 54.0 fl Final   • MPV 09/08/2020 8.9  6.0 - 12.0 fL Final   • Platelets 09/08/2020 320  140 - 450 10*3/mm3 Final   • Neutrophil % 09/08/2020 58.8  42.7 - 76.0 % Final   • Lymphocyte % 09/08/2020 27.7  19.6 - 45.3 % Final   • Monocyte % 09/08/2020 6.6  5.0 - 12.0 % Final   • Eosinophil % 09/08/2020 5.1  0.3 - 6.2 % Final   • Basophil % 09/08/2020 1.4  0.0 - 1.5 % Final   • Immature Grans % 09/08/2020 0.4  0.0 - 0.5 % Final   • Neutrophils, Absolute 09/08/2020 4.64  1.70 - 7.00 10*3/mm3 Final   • Lymphocytes, Absolute 09/08/2020 2.18  0.70 - 3.10 10*3/mm3 Final   • Monocytes, Absolute 09/08/2020 0.52  0.10 - 0.90 10*3/mm3 Final   • Eosinophils, Absolute 09/08/2020 0.40  0.00 - 0.40 10*3/mm3 Final   • Basophils, Absolute 09/08/2020 0.11  0.00 - 0.20 10*3/mm3 Final   • Immature Grans, Absolute 09/08/2020 0.03  0.00 - 0.05 10*3/mm3 Final   • nRBC 09/08/2020 0.0  0.0 - 0.2 /100 WBC Final   • Glucose 09/08/2020 106  70 - 130 mg/dL Final    Serial Number: [id number]:  [id number]   • Glucose 09/09/2020 97  65 - 99 mg/dL Final   • BUN 09/09/2020 12  6 - 20 mg/dL Final   • Creatinine 09/09/2020 0.66  0.57 - 1.00 mg/dL Final   • Sodium 09/09/2020 142  136 - 145 mmol/L Final   • Potassium 09/09/2020 4.1  3.5 - 5.2 mmol/L Final   • Chloride 09/09/2020 102  98 - 107 mmol/L Final   • CO2 09/09/2020 28.5  22.0 - 29.0 mmol/L Final   • Calcium 09/09/2020 10.2  8.6 - 10.5 mg/dL Final   • eGFR Non African Amer 09/09/2020 94  >60 mL/min/1.73 Final   • BUN/Creatinine Ratio 09/09/2020 18.2  7.0 - 25.0 Final   • Anion Gap 09/09/2020 11.5  5.0 - 15.0 mmol/L Final    • WBC 09/09/2020 6.27  3.40 - 10.80 10*3/mm3 Final   • RBC 09/09/2020 4.72  3.77 - 5.28 10*6/mm3 Final   • Hemoglobin 09/09/2020 13.5  12.0 - 15.9 g/dL Final   • Hematocrit 09/09/2020 40.8  34.0 - 46.6 % Final   • MCV 09/09/2020 86.4  79.0 - 97.0 fL Final   • MCH 09/09/2020 28.6  26.6 - 33.0 pg Final   • MCHC 09/09/2020 33.1  31.5 - 35.7 g/dL Final   • RDW 09/09/2020 14.6  12.3 - 15.4 % Final   • RDW-SD 09/09/2020 46.2  37.0 - 54.0 fl Final   • MPV 09/09/2020 9.2  6.0 - 12.0 fL Final   • Platelets 09/09/2020 287  140 - 450 10*3/mm3 Final   • Neutrophil % 09/09/2020 54.3  42.7 - 76.0 % Final   • Lymphocyte % 09/09/2020 31.4  19.6 - 45.3 % Final   • Monocyte % 09/09/2020 7.0  5.0 - 12.0 % Final   • Eosinophil % 09/09/2020 5.6  0.3 - 6.2 % Final   • Basophil % 09/09/2020 1.4  0.0 - 1.5 % Final   • Immature Grans % 09/09/2020 0.3  0.0 - 0.5 % Final   • Neutrophils, Absolute 09/09/2020 3.40  1.70 - 7.00 10*3/mm3 Final   • Lymphocytes, Absolute 09/09/2020 1.97  0.70 - 3.10 10*3/mm3 Final   • Monocytes, Absolute 09/09/2020 0.44  0.10 - 0.90 10*3/mm3 Final   • Eosinophils, Absolute 09/09/2020 0.35  0.00 - 0.40 10*3/mm3 Final   • Basophils, Absolute 09/09/2020 0.09  0.00 - 0.20 10*3/mm3 Final   • Immature Grans, Absolute 09/09/2020 0.02  0.00 - 0.05 10*3/mm3 Final   • nRBC 09/09/2020 0.0  0.0 - 0.2 /100 WBC Final   • Protime 09/09/2020 13.2  12.0 - 15.1 Seconds Final   • INR 09/09/2020 0.97  0.90 - 1.10 Final   • TSH 09/09/2020 1.490  0.270 - 4.200 uIU/mL Final   • Total Cholesterol 09/09/2020 233 (H)  0 - 200 mg/dL Final   • Triglycerides 09/09/2020 84  0 - 150 mg/dL Final   • HDL Cholesterol 09/09/2020 80 (H)  40 - 60 mg/dL Final   • LDL Cholesterol  09/09/2020 136 (H)  0 - 100 mg/dL Final   • VLDL Cholesterol 09/09/2020 16.8  mg/dL Final   • LDL/HDL Ratio 09/09/2020 1.70   Final      No radiology results for the last 90 days.    Assessment / Plan      Assessment & Plan:  1. Elevated LFTs  2. Fatty liver  Patient has a  intermittent elevation the liver enzymes since at least for the past 2 years as per our record.  Recent lab work done in July 2022 revealed elevated AST of 75 ALT of 136 with alkaline phosphatase of 158 with normal T bili of 0.6 her albumin was normal at 5 platelets were normal without any signs of cirrhosis.  Recent ultrasound abdomen done on 4/23/2022 revealed fatty liver disease about any liver lesions.  Her acute hepatitis panel was negative except hep C antibody was positive.  GISELLE was negative.  She is currently on low-dose statins Crestor 20 mg, but her elevated liver enzyme most likely secondary to nonalcoholic fatty liver disease.    Patient gained over 25 pounds since she had a CVA in 2020  Discussion regarding dietary changes and weight loss and advised at least 15- 20 pounds to lose to improve her steatosis and prevent progression of the liver disease  Mediterranean diet  We had a discussion on referring to nonsurgical weight management program however patient and the family declined.  We will get a basic liver work-up to rule out any other etiology for elevated liver enzymes.    - Hepatitis A Antibody, Total; Future  - Hepatitis B Core Antibody, Total; Future  - Hepatitis B Surface Antibody; Future  - Hepatitis B Surface Antigen; Future  - Hepatitis C Antibody; Future  - Iron Profile; Future  - Ferritin; Future  - Mitochondrial Antibodies, M2; Future  - Protime-INR; Future  - SKELTON Fibrosure; Future  - CBC Auto Differential; Future  - Ceruloplasmin; Future  - Alpha - 1 - Antitrypsin; Future  - GISELLE; Future  - Anti-Smooth Muscle Antibody Titer; Future    3. Nausea  4.  History of gastroesophageal reflux disease.  Intermittent mild nausea once in a while.  Recommend Zofran as needed.  She had a reflux symptoms in the past but no current reflux symptoms on PPI.  We will reduce the dose of pantoprazole to 20 mg p.o. daily for now with intention to change to as needed in the near future.    4. Personal history  of colonic polyps  She had a colonoscopy done in 2015 and had polyps removed.  As per patient she had a postprocedure bleeding and had to go back again for control of bleeding.  She is due for surveillance colonoscopy and we discussed on this.  At this time patient does not want to undergo any colonoscopy or to get any stool test done.  Patient and the family is aware of any missing colonic lesions.      Follow Up:   Return in about 8 weeks (around 1/12/2023).    Lydia Phelps MD  [facility]  11/17/2022  18:32 EST    Please note that portions of this note may have been completed with a voice recognition program.

## 2022-11-19 LAB
ANA SER QL: NEGATIVE
HAV AB SER QL IA: POSITIVE
HBV CORE AB SERPL QL IA: NEGATIVE
MITOCHONDRIA M2 IGG SER-ACNC: <20 UNITS (ref 0–20)
SMA IGG SER-ACNC: 9 UNITS (ref 0–19)

## 2022-11-22 LAB
A2 MACROGLOB SERPL-MCNC: 199 MG/DL (ref 110–276)
ALT SERPL W P-5'-P-CCNC: 163 IU/L (ref 0–40)
APO A-I SERPL-MCNC: 155 MG/DL (ref 116–209)
AST SERPL W P-5'-P-CCNC: 121 IU/L (ref 0–40)
BILIRUB SERPL-MCNC: 0.1 MG/DL (ref 0–1.2)
CHOLEST SERPL-MCNC: 141 MG/DL (ref 100–199)
FIBROSIS SCORING:: ABNORMAL
FIBROSIS STAGE SERPL QL: ABNORMAL
GGT SERPL-CCNC: 539 IU/L (ref 0–60)
GLUCOSE SERPL-MCNC: 122 MG/DL (ref 70–99)
HAPTOGLOB SERPL-MCNC: 292 MG/DL (ref 33–346)
INTERPRETATIONS: (REFERENCE): ABNORMAL
LABORATORY COMMENT REPORT: ABNORMAL
LIVER FIBR SCORE SERPL CALC.FIBROSURE: 0.12 (ref 0–0.21)
NASH SCORING (REFERENCE): ABNORMAL
NECROINFLAMMATORY ACT GRADE SERPL QL: ABNORMAL
NECROINFLAMMATORY ACT SCORE SERPL: 0.5
SERVICE CMNT-IMP: ABNORMAL
STEATOSIS GRADE (REFERENCE): ABNORMAL
STEATOSIS GRADING (REFERENCE): ABNORMAL
STEATOSIS SCORE (REFERENCE): 0.95 (ref 0–0.3)
TRIGL SERPL-MCNC: 201 MG/DL (ref 0–149)

## 2022-11-27 ENCOUNTER — PATIENT MESSAGE (OUTPATIENT)
Dept: GASTROENTEROLOGY | Facility: CLINIC | Age: 52
End: 2022-11-27

## 2022-11-28 RX ORDER — PANTOPRAZOLE SODIUM 40 MG/1
40 TABLET, DELAYED RELEASE ORAL DAILY
Qty: 90 TABLET | Refills: 3 | Status: SHIPPED | OUTPATIENT
Start: 2022-11-28

## 2023-01-11 ENCOUNTER — OFFICE VISIT (OUTPATIENT)
Dept: GASTROENTEROLOGY | Facility: CLINIC | Age: 53
End: 2023-01-11
Payer: COMMERCIAL

## 2023-01-11 VITALS
DIASTOLIC BLOOD PRESSURE: 76 MMHG | SYSTOLIC BLOOD PRESSURE: 141 MMHG | TEMPERATURE: 98.4 F | HEIGHT: 65 IN | RESPIRATION RATE: 16 BRPM | HEART RATE: 78 BPM | BODY MASS INDEX: 29.55 KG/M2

## 2023-01-11 DIAGNOSIS — K75.81 NONALCOHOLIC STEATOHEPATITIS: ICD-10-CM

## 2023-01-11 DIAGNOSIS — R79.89 ELEVATED LFTS: Primary | ICD-10-CM

## 2023-01-11 DIAGNOSIS — K21.9 GASTROESOPHAGEAL REFLUX DISEASE WITHOUT ESOPHAGITIS: ICD-10-CM

## 2023-01-11 PROCEDURE — 99213 OFFICE O/P EST LOW 20 MIN: CPT | Performed by: INTERNAL MEDICINE

## 2023-01-11 NOTE — PROGRESS NOTES
Follow Up Note     Date: 2023   Patient Name: Yee Goodwin  MRN: [id number]  : 1970     Referring Physician: Lula Marlow DO    Chief Complaint:    Chief Complaint   Patient presents with   • Elevated Hepatic Enzymes   • Nausea   • Heartburn   • Fatty liver       Interval History:   2023  Yee Goodwin is a 52 y.o. female who is here today for follow up for elevated liver enzymes.  She states that she cut down her Protonix dose however she could not stay on the low-dose as her reflux symptoms worsened and had to start back on Protonix 40 mg p.o. daily    2022  Yee Goodwin is a 52 y.o. female who is here today to establish care with Gastroenterology for evaluation of her recently found elevated liver enzymes.     Patient states that she had routine lab work done recently in 2022 which revealed elevated liver enzymes and subsequent follow-up of blood work done also revealed elevated liver enzymes.  She states that she had a stroke 2 years ago and had a right-sided hemiplegia.  He is mostly bedbound and gained about 20 to 25 pounds since then.  Her mom had a fatty liver disease but no family history of any liver cirrhosis.  Patient denies any new medications she is currently on statins     She had issues with his dysphagia after CVA and had a PEG tube placed in  that was removed later on, currently she is eating normally without any issues.  She had significant reflux disease in the past for which she was on a Protonix 40 mg p.o. daily. she denies any reflux symptoms.  She does have intermittent nausea episodes.     As per patient she had a colonoscopy done in  and had a polyps removed.  Postprocedure she developed bleeding and had to go back again for control of bleeding.  She denies any constipation or diarrhea no change in bowel habit no blood in the stool no black stools. Denies alcohol abuse or cigarette smoking.  She is an ex-smoker    Subjective      Past  Medical History:   Past Medical History:   Diagnosis Date   • Allergic rhinitis    • Arthritis    • Back pain    • Cerebral hemorrhage (HCC)    • Chicken pox    • Diverticulitis    • Fatty liver    • Folic acid deficiency    • GERD (gastroesophageal reflux disease)    • GI (gastrointestinal bleed) at  2017 or 16    after colonoscopy   • Headache    • Hidradenitis suppurativa    • History of blood transfusion    • Hordeolum of lower eyelid    • Hypercalcemia    • Hyperlipidemia    • Hypertension    • Hypoglycemia    • Impaired functional mobility, balance, gait, and endurance    • Low back pain    • Lumbar spondylolysis    • NAFLD (nonalcoholic fatty liver disease)    • Neuropathy    • OA (osteoarthritis of the spine)    • Prediabetes    • Pulmonary emphysema (HCC)    • Seizures (HCC)    • Snores    • Stroke (HCC) 2020   • Tattoos    • Vitamin D deficiency      Past Surgical History:   Past Surgical History:   Procedure Laterality Date   • ABDOMINAL SURGERY  2004    gall bladder removal   • CEREBRAL ANGIOGRAM N/A 01/29/2020    Procedure: Cerebral angiogram;  Surgeon: Merlin Black MD;  Location:  [facility];  Service: Interventional Radiology   • CHOLECYSTECTOMY N/A    • COLONOSCOPY N/A 2015   • CRANIOTOMY FOR SUBDURAL HEMATOMA Left 01/30/2020    Procedure: FRONTAL CRANIOTOMY FOR EVACUATION OF INTRAPARENCHYMAL HEMATOMA  LEFT;  Surgeon: Jose Cole MD;  Location: [facility] OR;  Service: Neurosurgery;  Laterality: Left;   • ENDOSCOPY W/ PEG TUBE PLACEMENT N/A 02/03/2020    Procedure: ESOPHAGOGASTRODUODENOSCOPY WITH PERCUTANEOUS ENDOSCOPIC GASTROSTOMY TUBE INSERTION AT BEDSIDE;  Surgeon: Andrew Young MD;  Location:  [facility];  Service: General;  Laterality: N/A;   • HAND TENDON SURGERY Right     patient hand right thumb tendon surgery   • OVARIAN CYST REMOVAL     • TOTAL HIP ARTHROPLASTY Right 2002   • TOTAL HIP ARTHROPLASTY REVISION Right     x 2   • TUBAL ABDOMINAL  LIGATION Bilateral    • UPPER GASTROINTESTINAL ENDOSCOPY         Family History:   Family History   Problem Relation Age of Onset   • Arthritis Mother    • Hypertension Mother    • Stroke Mother    • Arthritis Father    • Cancer Father    • Lung cancer Father    • Throat cancer Father    • Heart disease Sister    • Pulmonary embolism Brother    • Colon cancer Neg Hx        Social History:   Social History     Socioeconomic History   • Marital status:    Tobacco Use   • Smoking status: Former     Packs/day: 0.50     Years: 25.00     Pack years: 12.50     Types: Cigarettes     Quit date: 2018     Years since quittin.0   • Smokeless tobacco: Never   Vaping Use   • Vaping Use: Never used   Substance and Sexual Activity   • Alcohol use: Yes     Alcohol/week: 3.0 standard drinks     Types: 3 Glasses of wine per week     Comment: 1 glass of wine every 2-3 days   • Drug use: Never   • Sexual activity: Defer       Medications:     Current Outpatient Medications:   •  acetaminophen (TYLENOL) 325 MG tablet, Take 2 tablets by mouth Every 6 (Six) Hours As Needed for Mild Pain ., Disp: , Rfl:   •  carvedilol (COREG) 12.5 MG tablet, Take 12.5 mg by mouth 2 (Two) Times a Day With Meals., Disp: , Rfl:   •  co-enzyme Q-10 30 MG capsule, CoQ-10 30 mg capsule  Take 1 capsule every day by oral route for 30 days., Disp: , Rfl:   •  cyclobenzaprine (FLEXERIL) 10 MG tablet, Take 1 tablet every day by oral route at bedtime for 30 days., Disp: , Rfl:   •  doxylamine (UNISOM) 25 MG tablet, Take 25 mg by mouth At Night As Needed for Sleep., Disp: , Rfl:   •  ergocalciferol (ERGOCALCIFEROL) 1.25 MG (30034 UT) capsule, Vitamin D2 1,250 mcg (50,000 unit) capsule  Take 1 capsule every week by oral route for 90 days., Disp: , Rfl:   •  folic acid (FOLVITE) 1 MG tablet, Take 1 mg by mouth Daily., Disp: , Rfl:   •  furosemide (LASIX) 20 MG tablet, Lasix 20 mg tablet  Take 1 tablet as needed by oral route in the morning for 90  days., Disp: , Rfl:   •  gabapentin (NEURONTIN) 400 MG capsule, Take 400 mg by mouth Daily., Disp: , Rfl:   •  ibuprofen (ADVIL,MOTRIN) 800 MG tablet, ibuprofen 800 mg tablet  TAKE 1 TABLET BY MOUTH 1-2 times per week, only as needed, Disp: , Rfl:   •  levETIRAcetam (KEPPRA) 500 MG tablet, TAKE 1 TABLET BY MOUTH EVERY 12 HOURS, Disp: 60 tablet, Rfl: 5  •  lidocaine (LIDODERM) 5 %, lidocaine 5 % topical patch  APPLY 1 PATCH BY TOPICAL ROUTE ONCE DAILY (MAY WEAR UP TO 12HOURS.), Disp: , Rfl:   •  lisinopril (PRINIVIL,ZESTRIL) 10 MG tablet, lisinopril 10 mg tablet  TAKE 1 TABLET BY MOUTH EVERY DAY, Disp: , Rfl:   •  pantoprazole (PROTONIX) 40 MG EC tablet, Take 1 tablet by mouth Daily., Disp: 90 tablet, Rfl: 3  •  potassium chloride 10 MEQ CR tablet, potassium chloride ER 10 mEq tablet,extended release  Take 1 tablet every day by oral route as needed for 90 days.  Take with lasix, Disp: , Rfl:   •  rosuvastatin (CRESTOR) 20 MG tablet, rosuvastatin 20 mg tablet  TAKE 1 TABLET BY MOUTH EVERY DAY AT BEDTIME, Disp: , Rfl:   •  triamcinolone (KENALOG) 0.1 % cream, APPLY THIN LAYER TOPICALLY TO THE AFFECTED AREA TWICE DAILY, Disp: , Rfl:     Allergies:   Allergies   Allergen Reactions   • Venlafaxine Rash       Review of Systems:   Review of Systems   Constitutional: Negative for appetite change, fatigue, fever and unexpected weight loss.   HENT: Positive for trouble swallowing.    Gastrointestinal: Positive for nausea and GERD. Negative for abdominal distention, abdominal pain, anal bleeding, blood in stool, constipation, diarrhea, rectal pain, vomiting and indigestion.       The following portions of the patient's history were reviewed and updated as appropriate: allergies, current medications, past family history, past medical history, past social history, past surgical history and problem list.    Objective     Physical Exam:  Vital Signs:   Vitals:    01/11/23 1717   BP: 141/76   Pulse: 78   Resp: 16   Temp: 98.4 °F  "(36.9 °C)   TempSrc: Infrared   Weight: Comment: W/C   Height: 165.1 cm (65\")       Physical Exam  Constitutional:       Appearance: She is obese.      Comments: On a wheelchair   HENT:      Head: Normocephalic and atraumatic.   Eyes:      Conjunctiva/sclera: Conjunctivae normal.   Abdominal:      General: Abdomen is flat. There is no distension.      Palpations: There is no mass.      Tenderness: There is no abdominal tenderness. There is no guarding or rebound.      Hernia: No hernia is present.   Musculoskeletal:      Cervical back: Normal range of motion and neck supple.   Neurological:      Mental Status: She is alert.         Results Review:   I reviewed the patient's new clinical results.    Lab on 11/17/2022   Component Date Value Ref Range Status   • WBC 11/17/2022 9.24  3.40 - 10.80 10*3/mm3 Final   • RBC 11/17/2022 4.37  3.77 - 5.28 10*6/mm3 Final   • Hemoglobin 11/17/2022 13.1  12.0 - 15.9 g/dL Final   • Hematocrit 11/17/2022 39.7  34.0 - 46.6 % Final   • MCV 11/17/2022 90.8  79.0 - 97.0 fL Final   • MCH 11/17/2022 30.0  26.6 - 33.0 pg Final   • MCHC 11/17/2022 33.0  31.5 - 35.7 g/dL Final   • RDW 11/17/2022 12.2 (L)  12.3 - 15.4 % Final   • RDW-SD 11/17/2022 40.1  37.0 - 54.0 fl Final   • MPV 11/17/2022 9.6  6.0 - 12.0 fL Final   • Platelets 11/17/2022 289  140 - 450 10*3/mm3 Final   • Neutrophil % 11/17/2022 68.8  42.7 - 76.0 % Final   • Lymphocyte % 11/17/2022 17.5 (L)  19.6 - 45.3 % Final   • Monocyte % 11/17/2022 7.6  5.0 - 12.0 % Final   • Eosinophil % 11/17/2022 4.1  0.3 - 6.2 % Final   • Basophil % 11/17/2022 1.5  0.0 - 1.5 % Final   • Immature Grans % 11/17/2022 0.5  0.0 - 0.5 % Final   • Neutrophils, Absolute 11/17/2022 6.35  1.70 - 7.00 10*3/mm3 Final   • Lymphocytes, Absolute 11/17/2022 1.62  0.70 - 3.10 10*3/mm3 Final   • Monocytes, Absolute 11/17/2022 0.70  0.10 - 0.90 10*3/mm3 Final   • Eosinophils, Absolute 11/17/2022 0.38  0.00 - 0.40 10*3/mm3 Final   • Basophils, Absolute 11/17/2022 " 0.14  0.00 - 0.20 10*3/mm3 Final   • Immature Grans, Absolute 11/17/2022 0.05  0.00 - 0.05 10*3/mm3 Final   • nRBC 11/17/2022 0.0  0.0 - 0.2 /100 WBC Final   • Hep A Total Ab 11/17/2022 Positive (A)  Negative Final   • Hep B Core Total Ab 11/17/2022 Negative  Negative Final   • Hep B S Ab 11/17/2022 Non-Reactive  Non-Reactive Final   • Hepatitis B Surface Ag 11/17/2022 Non-Reactive  Non-Reactive Final   • Hepatitis C Ab 11/17/2022 Non-Reactive  Non-Reactive Final   • Iron 11/17/2022 89  37 - 145 mcg/dL Final   • Iron Saturation 11/17/2022 22  20 - 50 % Final   • Transferrin 11/17/2022 275  200 - 360 mg/dL Final   • TIBC 11/17/2022 410  298 - 536 mcg/dL Final   • Ferritin 11/17/2022 1,382.00 (H)  13.00 - 150.00 ng/mL Final   • Mitochondrial Ab 11/17/2022 <20.0  0.0 - 20.0 Units Final                                    Negative    0.0 - 20.0                                  Equivocal  20.1 - 24.9                                  Positive         >24.9  Mitochondrial (M2) Antibodies are found in 90-96% of  patients with primary biliary cirrhosis.   • Protime 11/17/2022 13.1  12.5 - 14.5 Seconds Final   • INR 11/17/2022 0.96  0.90 - 1.10 Final   • Fibrosis Score (References) 11/17/2022 0.12  0.00 - 0.21 Final   • Fibrosis Stage (Reference) 11/17/2022 Comment   Final                       F0 - No fibrosis   • Steatosis Score (Reference) 11/17/2022 0.95 (H)  0.00 - 0.30 Final   • Steatosis Grade (Reference) 11/17/2022 Comment   Final                S3 - Marked or Severe Steatosis   • SKELTON Score (Reference) 11/17/2022 0.50 (H)  0.25 Final   • Skelton Grade (Reference) 11/17/2022 Comment   Final                N1 - Borderline or probable SKELTON   • Height: (Reference) 11/17/2022 65  in Final   • Weight: (Reference) 11/17/2022 177  LBS Final   • Alpha 2-Macroglobulins, Qn 11/17/2022 199  110 - 276 mg/dL Final   • Haptoglobin 11/17/2022 292  33 - 346 mg/dL Final   • Apolipoprotein A-1 11/17/2022 155  116 - 209 mg/dL Final   •  Total Bilirubin 2022 0.1  0.0 - 1.2 mg/dL Final   • GGT 2022 539 (H)  0 - 60 IU/L Final   • ALT (SGPT) 2022 163 (H)  0 - 40 IU/L Final   • AST (SGOT) P5P (Reference) 2022 121 (H)  0 - 40 IU/L Final   • Cholesterol, Total (Reference) 2022 141  100 - 199 mg/dL Final   • Glucose, Serum (Reference) 2022 122 (H)  70 - 99 mg/dL Final   • Triglycerides 2022 201 (H)  0 - 149 mg/dL Final   • Interpretations: (Reference) 2022 Comment   Final    Comment: Quantitative results of 10 biochemicals in combination with  age, gender, height, and weight, are analyzed using a  computational algorithm to provide a quantitative surrogate  marker (0.0-1.0) of liver fibrosis (Metavir F0-F4), hepatic  steatosis (0.0-1.0, S0-S3), and Non-Alcoholic Steato-  Hepatitis (SKELTON) (0.0-0.75, N0-N2). The absence of steatosis  (S<0.38) precludes the diagnosis of SKELTON.  Fibrosis marker:  In a study of 171 Non-Alcoholic Fatty  Liver Disease (NAFLD) patients where 23% had significant  NAFLD fibrosis (Metavir F2-F4) and 11% had cirrhosis by  liver biopsy, a fibrosis result of >0.3 yielded a  sensitivity of 83% and a specificity of 78% for the  detection of significant fibrosis(1).  Steatosis Marker:  In a population of 744 patients (583 HCV,  18 HBV, 69 NAFLD, and 74 alcoholic disease patients), where  36% had significant steatosis (>5%) on a liver biopsy, a  steatosis score >0.5 had a sensitivity of 71% and a  specificity of 72% for identification of                            significant  steatosis(2).  SKELTON marker:  In a population of 257 NAFLD patients, where  62% had at least some SKELTON by liver biopsy, a prediction of  SKELTON had a sensitivity of 88% for identifying SKELTON and a  specificity of 50%(3).   • Fibrosis Scorin2022 Comment   Final         <=0.21 = Stage F0 - No fibrosis  0.21 - 0.27 = Stage F0 - F1  0.27 - 0.31 = Stage F1 - Portal fibrosis  0.31 - 0.48 = Stage F1 - F2  0.48 - 0.58 =  Stage F2 - Bridging fibrosis with few septa  0.58 - 0.72 = Stage F3 - Bridging fibrosis with many septa  0.72 - 0.74 = Stage F3 - F4        >0.74 = Stage F4 - Cirrhosis   • Steatosis Grading (Reference) 11/17/2022 Comment   Final          < 0.30 = S0 - No Steatosis  0.30 to 0.38 = S0 - S1  0.38 to 0.48 = S1 - Minimal Steatosis  0.48 to 0.57 = S1 - S2  0.57 to 0.67 = S2 - Moderate Steatosis  0.67 to 0.69 = S2 - S3        > 0.69 = S3 - Marked or Severe Steatosis   • Toney Scoring (Reference) 11/17/2022 Comment   Final    0.25 = N0 - Not OTNEY  0.50 = N1 - Borderline or probable TONEY  0.75 = N2 - TONEY   • Limitations: (Reference) 11/17/2022 Comment   Final    TONEY FibroSure is recommended for patients with suspected non-  alcoholic fatty liver disease.  It is not recommended for patients  with other liver diseases.  It is also not recommended in patients  with Gilbert Disease, acute hemolysis, acute viral hepatitis, drug  induced hepatitis, genetic liver disease, autoimmune hepatitis and/or  extra-hepatic cholestasis.  Any of these clinical situations may lead  to inaccurate quantitative predictions of fibrosis.   • Comment (Reference) 11/17/2022 Comment   Final    This test was developed and its performance characteristics determined  by [facility].  It has not been cleared or approved by the Food and Drug  Administration.  The FDA has determined that such clearance or  approval is not necessary.  For questions regarding this report please contact  customer service at [phone].  References:  1. Lisha ANDRADE. et al. Diagnostic Value of Biochemical Markers     (FibroTest) for the prediction of Liver Fibrosis in     patients with Non-Alcoholic Fatty Liver Disease. [facility] 2006; 6:6.  2. KELVIN Mills. et al. The Diagnostic Value of Biomarkers     (Steato Test) for the Prediction of Liver Steatosis.     Comparative Hepatol. 2005; 4:10.  3. KELVIN Mills, Lamar Husain, et al. Diagnostic value     of  biochemical markers (SKELTON TEST) for the prediction of     non alcohol steato hepatitis in patients with non-     alcoholic fatty liver disease. [facility] 2006;     6:34 doi:10.1186/6074-749I-3-34.   • Ceruloplasmin 11/17/2022 29  19 - 39 mg/dL Final   • ALPHA -1 ANTITRYPSIN 11/17/2022 189  90 - 200 mg/dL Final   • GISELLE Direct 11/17/2022 Negative  Negative Final   • Smooth Muscle Ab 11/17/2022 9  0 - 19 Units Final                     Negative                     0 - 19                   Weak positive               20 - 30                   Moderate to strong positive     >30   Actin Antibodies are found in 52-85% of patients with   autoimmune hepatitis or chronic active hepatitis and   in 22% of patients with primary biliary cirrhosis.      No radiology results for the last 90 days.    Assessment / Plan      1. Elevated LFTs  2.  Nonalcoholic steatohepatitis; SKELTON FibroSure; F0 S3 and N1  She is immune to hepatitis A but not immune to hepatitis B hep C antibody is negative.  NA, anti-smooth muscle antibody, antimitochondrial antibody negative.  Alpha 1 antitrypsin ceruloplasmin level normal..  Iron level and iron saturation normal however ferritin was thousand 1300.  This is most likely associated with SKELTON.     She needs a yearly CMP  We will repeat ferritin next visit, if it is still high we will get HFE gene mutation.  She declined nonsurgical weight management program  Advised to lose 20 pounds    1/17/2022  Patient has a intermittent elevation the liver enzymes since at least for the past 2 years as per our record.  Recent lab work done in July 2022 revealed elevated AST of 75 ALT of 136 with alkaline phosphatase of 158 with normal T bili of 0.6 her albumin was normal at 5 platelets were normal without any signs of cirrhosis.  Recent ultrasound abdomen done on 4/23/2022 revealed fatty liver disease about any liver lesions.  Her acute hepatitis panel was negative. GISELLE was negative.  She is currently on  low-dose statins Crestor 20 mg, but her elevated liver enzyme most likely secondary to nonalcoholic fatty liver disease. Patient gained over 25 pounds since she had a CVA in 2020    3.  Intermittent nausea  4.  History of gastroesophageal reflux disease.  Reflux symptoms flared up with a low-dose Protonix and had to increase the dose to 40 mg p.o. daily.   Given her failure with a low-dose PPI we will continue Protonix 40 mg p.o. daily      Past history  5. Personal history of colonic polyps  She had a colonoscopy done in 2015 and had polyps removed.  As per patient she had a postprocedure bleeding and had to go back again for control of bleeding.  She is due for surveillance colonoscopy and we discussed on this.  At this time patient does not want to undergo any colonoscopy or to get any stool test done.  Patient and the family is aware of any missing colonic lesions.          Follow Up:   No follow-ups on file.    Lydia Phelsp MD  [facility] [place] [facility]  1/11/2023  17:19 EST     Please note that portions of this note may have been completed with a voice recognition program.

## 2023-04-15 DIAGNOSIS — R56.9 FOCAL SEIZURE: ICD-10-CM

## 2023-04-16 DIAGNOSIS — R56.9 FOCAL SEIZURE: ICD-10-CM

## 2023-04-17 RX ORDER — LEVETIRACETAM 500 MG/1
500 TABLET ORAL EVERY 12 HOURS
Qty: 60 TABLET | Refills: 5 | Status: SHIPPED | OUTPATIENT
Start: 2023-04-17

## 2023-04-17 NOTE — TELEPHONE ENCOUNTER
Rx Refill Note  Requested Prescriptions     Pending Prescriptions Disp Refills   • levETIRAcetam (KEPPRA) 500 MG tablet 60 tablet 5     Sig: Take 1 tablet by mouth Every 12 (Twelve) Hours.      Last filled:09/30/2022  Last office visit with prescribing clinician: 2/25/2022      Next office visit with prescribing clinician: 6/16/2023   Sent in prescription to pharmacy with refills    hCristina Judge MA  04/17/23, 16:13 EDT

## 2023-04-18 RX ORDER — LEVETIRACETAM 500 MG/1
TABLET ORAL
Qty: 60 TABLET | Refills: 5 | OUTPATIENT
Start: 2023-04-18

## 2023-04-18 NOTE — TELEPHONE ENCOUNTER
Rx Refill Note  Requested Prescriptions     Pending Prescriptions Disp Refills   • levETIRAcetam (KEPPRA) 500 MG tablet [Pharmacy Med Name: LEVETIRACETAM 500MG TABLETS] 60 tablet 5     Sig: TAKE 1 TABLET BY MOUTH EVERY 12 HOURS      Last filled:04/15/23  Last office visit with prescribing clinician: 2/25/2022      Next office visit with prescribing clinician: 6/16/2023   Sent in prescription to pharmacy with refills    Christina Judge MA  04/18/23, 09:23 EDT

## 2023-06-16 ENCOUNTER — OFFICE VISIT (OUTPATIENT)
Dept: NEUROLOGY | Facility: CLINIC | Age: 53
End: 2023-06-16
Payer: COMMERCIAL

## 2023-06-16 VITALS
DIASTOLIC BLOOD PRESSURE: 82 MMHG | HEIGHT: 65 IN | HEART RATE: 82 BPM | OXYGEN SATURATION: 95 % | SYSTOLIC BLOOD PRESSURE: 120 MMHG | BODY MASS INDEX: 29.55 KG/M2

## 2023-06-16 DIAGNOSIS — G89.0 CENTRAL PAIN SYNDROME: ICD-10-CM

## 2023-06-16 DIAGNOSIS — R56.9 FOCAL SEIZURE: Primary | ICD-10-CM

## 2023-06-16 PROCEDURE — 99213 OFFICE O/P EST LOW 20 MIN: CPT | Performed by: PSYCHIATRY & NEUROLOGY

## 2023-06-16 RX ORDER — LEVETIRACETAM 500 MG/1
500 TABLET ORAL EVERY 12 HOURS
Qty: 60 TABLET | Refills: 11 | Status: SHIPPED | OUTPATIENT
Start: 2023-06-16

## 2023-06-16 NOTE — PROGRESS NOTES
Subjective:    CC: Yee Goodwin is seen today  for Seizures       Current visit-patient has not had any seizures since her last visit with her last one being in 2020.  She continues to be compliant with Keppra 500 mg twice daily.  She has stopped taking gabapentin and only takes it as needed since the pain in her right arm and leg has improved.  Occasionally has right shoulder stiffness.  She also saw continues to have right-sided weakness for which she does exercises by herself.      Last visit-patient denies having any seizures since her last visit.  Her first and only seizure was in September 2020 9 months after her intracerebral hemorrhage.  She continues to take Keppra 500 mg twice a day.  Her pain in the right arm and leg has also improved and she has cut back on her gabapentin to 400 mg daily.  Has stopped getting PT now.  Does not wear an AFO for her right foot drop as it would rub against her skin.  Uses a hemiwalker at home.  Her blood pressure is well controlled.    Last visit-patient initially had difficulty getting her Keppra but has been getting it now at a low cost.  She continues to take a dose of 500 mg twice a day.  Denies having any seizure.  She also tried physical therapy for her right-sided weakness which has helped slightly.  Has not been wearing her AFO as she has difficulty walking with it.  Continues to be on Lipitor 40 mg daily.  She denies having any stiffness in her arm or leg.    Last visit-patient states she has not had any seizures since her last visit.  In fact her first and only seizure was in September of last year.  She continues to take Keppra 500 mg twice a day without any adverse effects.  Her blood pressure is well controlled now.  Patient continues to have some pain in her shoulder despite taking gabapentin 300 mg 3 times a day.  She also continues to have profound weakness in her right arm more than leg.  Stopped getting PT last year as her insurance would not pay for  "it.    Initial [id number]-old female accompanied by her  with a history of hypertension, left frontal intracerebral hemorrhage status post craniotomy, GERD presents with a stroke.  As per patient she had a left intracerebral hemorrhage in January with intraventricular extension that was attributed to her uncontrolled hypertension.  After that patient denied having any seizures but on September 8 she had an episode of inability to speak with just repeating \"okay\" over and over again.  She also notes that she had right arm tingling but no jerking.  Was brought to the hospital where she had a CT scan of the head and MRI brain that again showed a left frontal encephalomalacia but no acute abnormalities.  CT angiogram of the head and neck showed plaques within bilateral carotid bulbs and proximal ICAs but no significant stenosis.  EEG showed left frontal slowing with breach rhythm but no epileptiform activity.  Patient was started on Keppra 500 mg twice a day and has not had any more episodes since then.  She is able to tolerate the Keppra well other than mild somnolence.  Was also started on Lipitor as her LDL was 136.  She states that her blood pressure is much better controlled now.  Denies any febrile seizures as a child but there is a family history of seizures in her mother.  Of note-I personally reviewed her MRI brain and 's note from the hospital    The following portions of the patient's history were reviewed today and updated as of 10/30/2020  : allergies, current medications, past family history, past medical history, past social history, past surgical history and problem list  These document will be scanned to patient's chart.      Current Outpatient Medications:     acetaminophen (TYLENOL) 325 MG tablet, Take 2 tablets by mouth Every 6 (Six) Hours As Needed for Mild Pain ., Disp: , Rfl:     carvedilol (COREG) 12.5 MG tablet, Take 1 tablet by mouth 2 (Two) Times a Day With Meals., Disp: " , Rfl:     co-enzyme Q-10 30 MG capsule, CoQ-10 30 mg capsule  Take 1 capsule every day by oral route for 30 days., Disp: , Rfl:     cyclobenzaprine (FLEXERIL) 10 MG tablet, Take 1 tablet every day by oral route at bedtime for 30 days., Disp: , Rfl:     doxylamine (UNISOM) 25 MG tablet, Take 1 tablet by mouth At Night As Needed for Sleep., Disp: , Rfl:     ergocalciferol (ERGOCALCIFEROL) 1.25 MG (33318 UT) capsule, Vitamin D2 1,250 mcg (50,000 unit) capsule  Take 1 capsule every week by oral route for 90 days., Disp: , Rfl:     folic acid (FOLVITE) 1 MG tablet, Take 1 tablet by mouth Daily., Disp: , Rfl:     furosemide (LASIX) 20 MG tablet, Lasix 20 mg tablet  Take 1 tablet as needed by oral route in the morning for 90 days., Disp: , Rfl:     gabapentin (NEURONTIN) 400 MG capsule, Take 1 capsule by mouth As Needed., Disp: , Rfl:     ibuprofen (ADVIL,MOTRIN) 800 MG tablet, ibuprofen 800 mg tablet  TAKE 1 TABLET BY MOUTH 1-2 times per week, only as needed, Disp: , Rfl:     levETIRAcetam (KEPPRA) 500 MG tablet, Take 1 tablet by mouth Every 12 (Twelve) Hours., Disp: 60 tablet, Rfl: 11    lidocaine (LIDODERM) 5 %, lidocaine 5 % topical patch  APPLY 1 PATCH BY TOPICAL ROUTE ONCE DAILY (MAY WEAR UP TO 12HOURS.), Disp: , Rfl:     lisinopril (PRINIVIL,ZESTRIL) 10 MG tablet, lisinopril 10 mg tablet  TAKE 1 TABLET BY MOUTH EVERY DAY, Disp: , Rfl:     pantoprazole (PROTONIX) 40 MG EC tablet, Take 1 tablet by mouth Daily., Disp: 90 tablet, Rfl: 3    potassium chloride 10 MEQ CR tablet, potassium chloride ER 10 mEq tablet,extended release  Take 1 tablet every day by oral route as needed for 90 days.  Take with lasix, Disp: , Rfl:     rosuvastatin (CRESTOR) 20 MG tablet, rosuvastatin 20 mg tablet  TAKE 1 TABLET BY MOUTH EVERY DAY AT BEDTIME, Disp: , Rfl:     triamcinolone (KENALOG) 0.1 % cream, APPLY THIN LAYER TOPICALLY TO THE AFFECTED AREA TWICE DAILY, Disp: , Rfl:    Past Medical History:   Diagnosis Date    Allergic rhinitis      Arthritis     Back pain     Cerebral hemorrhage     Chicken pox     Diverticulitis     Fatty liver     Folic acid deficiency     GERD (gastroesophageal reflux disease)     GI (gastrointestinal bleed) at  2017 or 18    after colonoscopy    Headache     Hidradenitis suppurativa     History of blood transfusion     Hordeolum of lower eyelid     Hypercalcemia     Hyperlipidemia     Hypertension     Hypoglycemia     Impaired functional mobility, balance, gait, and endurance     Low back pain     Lumbar spondylolysis     NAFLD (nonalcoholic fatty liver disease)     Neuropathy     OA (osteoarthritis of the spine)     Prediabetes     Pulmonary emphysema     Seizures     Snores     Stroke 2020    Tattoos     Vitamin D deficiency       Past Surgical History:   Procedure Laterality Date    ABDOMINAL SURGERY  2004    gall bladder removal    CEREBRAL ANGIOGRAM N/A 01/29/2020    Procedure: Cerebral angiogram;  Surgeon: Merlin Black MD;  Location:  [facility];  Service: Interventional Radiology    CHOLECYSTECTOMY N/A     COLONOSCOPY N/A 2015    CRANIOTOMY FOR SUBDURAL HEMATOMA Left 01/30/2020    Procedure: FRONTAL CRANIOTOMY FOR EVACUATION OF INTRAPARENCHYMAL HEMATOMA  LEFT;  Surgeon: Jose Cole MD;  Location: [facility] OR;  Service: Neurosurgery;  Laterality: Left;    ENDOSCOPY W/ PEG TUBE PLACEMENT N/A 02/03/2020    Procedure: ESOPHAGOGASTRODUODENOSCOPY WITH PERCUTANEOUS ENDOSCOPIC GASTROSTOMY TUBE INSERTION AT BEDSIDE;  Surgeon: Andrew Young MD;  Location:  [facility];  Service: General;  Laterality: N/A;    HAND TENDON SURGERY Right     patient hand right thumb tendon surgery    OVARIAN CYST REMOVAL      TOTAL HIP ARTHROPLASTY Right 2002    TOTAL HIP ARTHROPLASTY REVISION Right     x 2    TUBAL ABDOMINAL LIGATION Bilateral 2004    UPPER GASTROINTESTINAL ENDOSCOPY  2017      Family History   Problem Relation Age of Onset    Arthritis Mother     Hypertension Mother     Stroke  "Mother     Arthritis Father     Cancer Father     Lung cancer Father     Throat cancer Father     Heart disease Sister     Pulmonary embolism Brother     Colon cancer Neg Hx       Social History     Socioeconomic History    Marital status:    Tobacco Use    Smoking status: Former     Packs/day: 0.50     Years: 25.00     Pack years: 12.50     Types: Cigarettes     Quit date: 2018     Years since quittin.4     Passive exposure: Past    Smokeless tobacco: Never   Vaping Use    Vaping Use: Never used   Substance and Sexual Activity    Alcohol use: Yes     Alcohol/week: 3.0 standard drinks     Types: 3 Glasses of wine per week     Comment: 1 glass of wine every 2-3 days    Drug use: Never    Sexual activity: Defer     Review of Systems   Musculoskeletal:  Positive for arthralgias and gait problem.   Neurological:  Positive for weakness.   All other systems reviewed and are negative.    Objective:    /82   Pulse 82   Ht 165.1 cm (65\")   SpO2 95%   BMI 29.55 kg/m²     Neurology Exam:    General apperance: NAD.     Mental status: Alert, awake and oriented to time place and person.  Could tell me the date, month and her full address    Recent and Remote memory: Intact.    Attention span and Concentration: Normal.     Language and Speech: Intact- No dysarthria but slightly halting speech.    Fluency, Naming , Repitition and Comprehension:  Intact    Cranial Nerves:   CN II: Visual fields are full. Intact. Fundi - Normal, No papillederma, Pupils - BENJIE  CN III, IV and VI: Extraocular movements are intact. Normal saccades.   CN V: Facial sensation is intact.   CN VII: Muscles of facial expression reveal no asymmetry. Intact.   CN VIII: Hearing is intact. Whispered voice intact.   CN IX and X: Palate elevates symmetrically. Intact  CN XI: Shoulder shrug is intact.   CN XII: Tongue is midline without evidence of atrophy or fasciculation.     Motor:  Right UE muscle strength 1-2/5.  Arm appears slightly " flaccid today    Left UE muscle strength 5/5. Normal tone.      Right LE muscle strength4/5 proximally, 3/5 onKF, 3/5 on PF,1/5 DF. Normal tone.     Left LE muscle strength 5/5. Normal tone.      Gait: High steppage gait.  Uses a hemiwalker at home.  Using a wheelchair today    Assessment and Plan:  1. Focal seizure (CMS/HCC)  Patient most likely had a focal seizure from the area of her left frontal encephalomalacia  I have asked her to continue Keppra 500 mg twice a day.   Counseled on medication compliance    2.  Central Pain syndrome  On gabapentin 400 mg daily only as needed  She should continue carrying out exercises for her right-sided weakness           Return in about 1 year (around 6/16/2024).         Meme Padilla MD

## 2024-01-19 RX ORDER — PANTOPRAZOLE SODIUM 40 MG/1
40 TABLET, DELAYED RELEASE ORAL DAILY
Qty: 30 TABLET | Refills: 6 | OUTPATIENT
Start: 2024-01-19

## 2024-02-13 ENCOUNTER — TRANSCRIBE ORDERS (OUTPATIENT)
Dept: ADMINISTRATIVE | Facility: HOSPITAL | Age: 54
End: 2024-02-13
Payer: COMMERCIAL

## 2024-02-13 DIAGNOSIS — N18.30 STAGE 3 CHRONIC KIDNEY DISEASE, UNSPECIFIED WHETHER STAGE 3A OR 3B CKD: Primary | ICD-10-CM

## 2024-04-05 ENCOUNTER — HOSPITAL ENCOUNTER (OUTPATIENT)
Dept: ULTRASOUND IMAGING | Facility: HOSPITAL | Age: 54
Discharge: HOME | End: 2024-04-05
Payer: COMMERCIAL

## 2024-04-05 DIAGNOSIS — N18.30 STAGE 3 CHRONIC KIDNEY DISEASE, UNSPECIFIED WHETHER STAGE 3A OR 3B CKD: ICD-10-CM

## 2024-04-05 PROCEDURE — 76775 US EXAM ABDO BACK WALL LIM: CPT

## 2024-06-25 ENCOUNTER — TELEMEDICINE (OUTPATIENT)
Dept: NEUROLOGY | Facility: CLINIC | Age: 54
End: 2024-06-25
Payer: COMMERCIAL

## 2024-06-25 DIAGNOSIS — R56.9 FOCAL SEIZURE: Primary | ICD-10-CM

## 2024-06-25 RX ORDER — LEVETIRACETAM 500 MG/1
500 TABLET ORAL EVERY 12 HOURS
Qty: 60 TABLET | Refills: 11 | Status: SHIPPED | OUTPATIENT
Start: 2024-06-25

## 2024-06-25 NOTE — PROGRESS NOTES
Subjective:    CC: Yee Goodwin is seen today via video visit for seizures    Current visit-patient denies having any seizures in the past year.  Her last episode was in 2020.  She continues to take Keppra 500 mg twice daily.  She no longer takes gabapentin.  Her last sodium level was 133.  Her potassium was normal however it has dropped down to low in the past and she is now taking supplements.  Is on furosemide for swelling of the right leg.  Her blood pressure is running within normal limits.      Last visit-patient has not had any seizures since her last visit with her last one being in 2020.  She continues to be compliant with Keppra 500 mg twice daily.  She has stopped taking gabapentin and only takes it as needed since the pain in her right arm and leg has improved.  Occasionally has right shoulder stiffness.  She also saw continues to have right-sided weakness for which she does exercises by herself.      Last visit-patient denies having any seizures since her last visit.  Her first and only seizure was in September 2020 9 months after her intracerebral hemorrhage.  She continues to take Keppra 500 mg twice a day.  Her pain in the right arm and leg has also improved and she has cut back on her gabapentin to 400 mg daily.  Has stopped getting PT now.  Does not wear an AFO for her right foot drop as it would rub against her skin.  Uses a hemiwalker at home.  Her blood pressure is well controlled.    Last visit-patient initially had difficulty getting her Keppra but has been getting it now at a low cost.  She continues to take a dose of 500 mg twice a day.  Denies having any seizure.  She also tried physical therapy for her right-sided weakness which has helped slightly.  Has not been wearing her AFO as she has difficulty walking with it.  Continues to be on Lipitor 40 mg daily.  She denies having any stiffness in her arm or leg.    Last visit-patient states she has not had any seizures since her last visit.   "In fact her first and only seizure was in September of last year.  She continues to take Keppra 500 mg twice a day without any adverse effects.  Her blood pressure is well controlled now.  Patient continues to have some pain in her shoulder despite taking gabapentin 300 mg 3 times a day.  She also continues to have profound weakness in her right arm more than leg.  Stopped getting PT last year as her insurance would not pay for it.    Initial [id number]-old female accompanied by her  with a history of hypertension, left frontal intracerebral hemorrhage status post craniotomy, GERD presents with a stroke.  As per patient she had a left intracerebral hemorrhage in January with intraventricular extension that was attributed to her uncontrolled hypertension.  After that patient denied having any seizures but on September 8 she had an episode of inability to speak with just repeating \"okay\" over and over again.  She also notes that she had right arm tingling but no jerking.  Was brought to the hospital where she had a CT scan of the head and MRI brain that again showed a left frontal encephalomalacia but no acute abnormalities.  CT angiogram of the head and neck showed plaques within bilateral carotid bulbs and proximal ICAs but no significant stenosis.  EEG showed left frontal slowing with breach rhythm but no epileptiform activity.  Patient was started on Keppra 500 mg twice a day and has not had any more episodes since then.  She is able to tolerate the Keppra well other than mild somnolence.  Was also started on Lipitor as her LDL was 136.  She states that her blood pressure is much better controlled now.  Denies any febrile seizures as a child but there is a family history of seizures in her mother.  Of note-I personally reviewed her MRI brain and 's note from the hospital    The following portions of the patient's history were reviewed today and updated as of 10/30/2020  : allergies, current " medications, past family history, past medical history, past social history, past surgical history and problem list  These document will be scanned to patient's chart.      Current Outpatient Medications:     levETIRAcetam (KEPPRA) 500 MG tablet, Take 1 tablet by mouth Every 12 (Twelve) Hours., Disp: 60 tablet, Rfl: 11    acetaminophen (TYLENOL) 325 MG tablet, Take 2 tablets by mouth Every 6 (Six) Hours As Needed for Mild Pain ., Disp: , Rfl:     carvedilol (COREG) 12.5 MG tablet, Take 1 tablet by mouth 2 (Two) Times a Day With Meals., Disp: , Rfl:     co-enzyme Q-10 30 MG capsule, CoQ-10 30 mg capsule  Take 1 capsule every day by oral route for 30 days., Disp: , Rfl:     cyclobenzaprine (FLEXERIL) 10 MG tablet, Take 1 tablet every day by oral route at bedtime for 30 days., Disp: , Rfl:     doxylamine (UNISOM) 25 MG tablet, Take 1 tablet by mouth At Night As Needed for Sleep., Disp: , Rfl:     ergocalciferol (ERGOCALCIFEROL) 1.25 MG (45293 UT) capsule, Vitamin D2 1,250 mcg (50,000 unit) capsule  Take 1 capsule every week by oral route for 90 days., Disp: , Rfl:     folic acid (FOLVITE) 1 MG tablet, Take 1 tablet by mouth Daily., Disp: , Rfl:     furosemide (LASIX) 20 MG tablet, Lasix 20 mg tablet  Take 1 tablet as needed by oral route in the morning for 90 days., Disp: , Rfl:     ibuprofen (ADVIL,MOTRIN) 800 MG tablet, ibuprofen 800 mg tablet  TAKE 1 TABLET BY MOUTH 1-2 times per week, only as needed, Disp: , Rfl:     lidocaine (LIDODERM) 5 %, lidocaine 5 % topical patch  APPLY 1 PATCH BY TOPICAL ROUTE ONCE DAILY (MAY WEAR UP TO 12HOURS.), Disp: , Rfl:     lisinopril (PRINIVIL,ZESTRIL) 10 MG tablet, lisinopril 10 mg tablet  TAKE 1 TABLET BY MOUTH EVERY DAY, Disp: , Rfl:     pantoprazole (PROTONIX) 40 MG EC tablet, Take 1 tablet by mouth Daily., Disp: 90 tablet, Rfl: 3    potassium chloride 10 MEQ CR tablet, potassium chloride ER 10 mEq tablet,extended release  Take 1 tablet every day by oral route as needed for 90  days.  Take with lasix, Disp: , Rfl:     rosuvastatin (CRESTOR) 20 MG tablet, rosuvastatin 20 mg tablet  TAKE 1 TABLET BY MOUTH EVERY DAY AT BEDTIME, Disp: , Rfl:     triamcinolone (KENALOG) 0.1 % cream, APPLY THIN LAYER TOPICALLY TO THE AFFECTED AREA TWICE DAILY, Disp: , Rfl:    Past Medical History:   Diagnosis Date    Allergic rhinitis     Arthritis     Back pain     Cerebral hemorrhage     Chicken pox     Diverticulitis     Fatty liver     Folic acid deficiency     GERD (gastroesophageal reflux disease)     GI (gastrointestinal bleed) at  2017 or 18    after colonoscopy    Headache     Hidradenitis suppurativa     History of blood transfusion     Hordeolum of lower eyelid     Hypercalcemia     Hyperlipidemia     Hypertension     Hypoglycemia     Impaired functional mobility, balance, gait, and endurance     Low back pain     Lumbar spondylolysis     NAFLD (nonalcoholic fatty liver disease)     Neuropathy     OA (osteoarthritis of the spine)     Prediabetes     Pulmonary emphysema     Seizures     Snores     Stroke 2020    Tattoos     Vitamin D deficiency       Past Surgical History:   Procedure Laterality Date    ABDOMINAL SURGERY  2004    gall bladder removal    CEREBRAL ANGIOGRAM N/A 01/29/2020    Procedure: Cerebral angiogram;  Surgeon: Merlin Black MD;  Location:  [facility];  Service: Interventional Radiology    CHOLECYSTECTOMY N/A     COLONOSCOPY N/A 2015    CRANIOTOMY FOR SUBDURAL HEMATOMA Left 01/30/2020    Procedure: FRONTAL CRANIOTOMY FOR EVACUATION OF INTRAPARENCHYMAL HEMATOMA  LEFT;  Surgeon: Jose Cole MD;  Location: [facility] OR;  Service: Neurosurgery;  Laterality: Left;    ENDOSCOPY W/ PEG TUBE PLACEMENT N/A 02/03/2020    Procedure: ESOPHAGOGASTRODUODENOSCOPY WITH PERCUTANEOUS ENDOSCOPIC GASTROSTOMY TUBE INSERTION AT BEDSIDE;  Surgeon: Andrew Young MD;  Location:  [facility];  Service: General;  Laterality: N/A;    HAND TENDON SURGERY Right      patient hand right thumb tendon surgery    OVARIAN CYST REMOVAL      TOTAL HIP ARTHROPLASTY Right 2002    TOTAL HIP ARTHROPLASTY REVISION Right     x 2    TUBAL ABDOMINAL LIGATION Bilateral 2004    UPPER GASTROINTESTINAL ENDOSCOPY  2017      Family History   Problem Relation Age of Onset    Arthritis Mother     Hypertension Mother     Stroke Mother     Arthritis Father     Cancer Father     Lung cancer Father     Throat cancer Father     Heart disease Sister     Pulmonary embolism Brother     Colon cancer Neg Hx       Social History     Socioeconomic History    Marital status:    Tobacco Use    Smoking status: Former     Current packs/day: 0.00     Average packs/day: 0.5 packs/day for 25.0 years (12.5 ttl pk-yrs)     Types: Cigarettes     Start date: 1993     Quit date: 2018     Years since quittin.4     Passive exposure: Past    Smokeless tobacco: Never   Vaping Use    Vaping status: Never Used   Substance and Sexual Activity    Alcohol use: Yes     Alcohol/week: 3.0 standard drinks of alcohol     Types: 3 Glasses of wine per week     Comment: 1 glass of wine every 2-3 days    Drug use: Never    Sexual activity: Defer     Review of Systems   Musculoskeletal:  Positive for arthralgias and gait problem.   Neurological:  Positive for weakness.   All other systems reviewed and are negative.      Objective:    There were no vitals taken for this visit.    Neurology Exam:    General apperance: NAD.     Mental status: Alert, awake and oriented to time place and person.  Could tell me the date, month and her full address    Recent and Remote memory: Intact.    Attention span and Concentration: Normal.     Language and Speech: Intact- No dysarthria but slightly halting speech.    Fluency, Naming , Repitition and Comprehension:  Intact    Cranial Nerves:   CN II: Visual fields are full. Intact. Fundi - Normal, No papillederma, Pupils - BENJIE  CN III, IV and VI: Extraocular movements are intact. Normal  saccades.   CN V: Facial sensation is intact.   CN VII: Muscles of facial expression reveal no asymmetry. Intact.   CN VIII: Hearing is intact. Whispered voice intact.   CN IX and X: Palate elevates symmetrically. Intact  CN XI: Shoulder shrug is intact.   CN XII: Tongue is midline without evidence of atrophy or fasciculation.     Motor: At her last visit-  Right UE muscle strength 1-2/5.  Arm appears slightly flaccid today    Left UE muscle strength 5/5. Normal tone.      Right LE muscle strength4/5 proximally, 3/5 onKF, 3/5 on PF,1/5 DF. Normal tone.     Left LE muscle strength 5/5. Normal tone.      Gait: High steppage gait.  Uses a hemiwalker at home.  Using a wheelchair today    Assessment and Plan:  1. Focal seizure (CMS/HCC)  Patient most likely had a focal seizure from the area of her left frontal encephalomalacia secondary to hemorrhage  I have asked her to continue Keppra 500 mg twice a day.   Counseled on medication compliance  I have told her to speak to her PCP about keeping a check on her electrolytes.  Both Keppra and furosemide could cause hyponatremia.  If she feels dizzy she should drink Pedialyte or Gatorade    2.  Central Pain syndrome  Has stopped gabapentin as her pain has improved         Return in about 1 year (around 6/25/2025).         Meme Padilla MD

## (undated) DEVICE — STPCK LP 1WY RA 200PSI

## (undated) DEVICE — DRAINBAG,ANTI-REFLUX TOWER,L/F,2000ML,LL: Brand: MEDLINE

## (undated) DEVICE — "MH-438 A/W VLVE F/140 EVIS-140": Brand: AIR/WATER VALVE

## (undated) DEVICE — "MH-443 SUCTION VALVE F/EVIS140 EVIS160": Brand: SUCTION VALVE

## (undated) DEVICE — Device

## (undated) DEVICE — STPCK 3/WY HP M/RA W/OFF/HNDL 1050PSI STRL

## (undated) DEVICE — TUBING,OXYGEN,CRUSH RES,7',CLEAR,UC: Brand: MEDLINE INDUSTRIES, INC.

## (undated) DEVICE — ST ACC MICROPUNCTURE .018 TRANSLSS/SS/TP 5F/10CM 21G/7CM

## (undated) DEVICE — Device: Brand: AIR/WATER CHANNEL CLEANING ADAPTER

## (undated) DEVICE — BANDAGE,GAUZE,BULKEE II,4.5"X4.1YD,STRL: Brand: MEDLINE

## (undated) DEVICE — CONTN GRAD MEAS TRIANG 32OZ BLK

## (undated) DEVICE — NDL HYPO ECLPS SFTY 25G 1 1/2IN

## (undated) DEVICE — RADIFOCUS GLIDEWIRE: Brand: GLIDEWIRE

## (undated) DEVICE — CATH TEMPO 5F BER 100CM: Brand: TEMPO

## (undated) DEVICE — INTRO ACCSR BLNT TP

## (undated) DEVICE — KT BIOGUARD SXN VLV AIR/H20 4PC DISP

## (undated) DEVICE — TRAUMACATH™ LARGE STYLE VENTRICULAR CATHETER SET: Brand: TRAUMACATH™

## (undated) DEVICE — INTRO SHEATH ART/FEM ENGAGE .038 5F12CM

## (undated) DEVICE — GLV SURG PREMIERPRO MIC LTX PF SZ8.5 BRN

## (undated) DEVICE — TUBING, SUCTION, 1/4" X 10', STRAIGHT: Brand: MEDLINE

## (undated) DEVICE — 2963 MEDIPORE SOFT CLOTH TAPE 3 IN X 10 YD 12 RLS/CS: Brand: 3M™ MEDIPORE™

## (undated) DEVICE — ENDOGATOR HYBRID TUBING KIT FOR USE WITH ENDOGATOR IRRIGATION PUMP, OLYMPUS PUMP, GI4000 ESU, AND TORRENT IRRIGATION PUMP.: Brand: ENDOGATOR KIT

## (undated) DEVICE — ANGIO-SEAL VIP VASCULAR CLOSURE DEVICE: Brand: ANGIO-SEAL

## (undated) DEVICE — RADIFOCUS TORQUE DEVICE MULTI-TORQUE VISE: Brand: RADIFOCUS TORQUE DEVICE

## (undated) DEVICE — PENCL E/S HNDSWCH ROCKRBTN HOLSTR 10FT

## (undated) DEVICE — LIMB HOLDER, WRIST/ANKLE: Brand: DEROYAL

## (undated) DEVICE — ACCUDRAIN® EXTERNAL CSF DRAINAGE SYSTEM WITH ANTI-REFLUX VALVE: Brand: ACCUDRAIN®

## (undated) DEVICE — PERCUTANEOUS ENDOSCOPIC GASTROSTOMY KIT: Brand: ENDOVIVE SAFETY PEG KIT

## (undated) DEVICE — SKIN AFFIX SURG ADHESIVE 72/CS 0.55ML: Brand: MEDLINE

## (undated) DEVICE — STERILE LATEX POWDER FREE SURGICAL GLOVES WITH HYDROGEL COATING: Brand: PROTEXIS

## (undated) DEVICE — THE BITE BLOCK MAXI, LATEX FREE STRAP IS USED TO PROTECT THE ENDOSCOPE INSERTION TUBE FROM BEING BITTEN BY THE PATIENT.

## (undated) DEVICE — SYR LUERLOK 50ML

## (undated) DEVICE — SUT VICYL 2/0 CR8 18IN DYED J726D

## (undated) DEVICE — LEX NEURO ANGIOGRAPHY: Brand: MEDLINE INDUSTRIES, INC.

## (undated) DEVICE — SUT NUROLON 4/0 TF18 CR8 I8IN C584D